# Patient Record
Sex: FEMALE | Race: WHITE | Employment: OTHER | URBAN - METROPOLITAN AREA
[De-identification: names, ages, dates, MRNs, and addresses within clinical notes are randomized per-mention and may not be internally consistent; named-entity substitution may affect disease eponyms.]

---

## 2017-04-28 ENCOUNTER — OFFICE VISIT (OUTPATIENT)
Dept: INTERNAL MEDICINE CLINIC | Age: 67
End: 2017-04-28

## 2017-04-28 VITALS
HEART RATE: 75 BPM | OXYGEN SATURATION: 95 % | DIASTOLIC BLOOD PRESSURE: 70 MMHG | RESPIRATION RATE: 16 BRPM | BODY MASS INDEX: 24.35 KG/M2 | SYSTOLIC BLOOD PRESSURE: 124 MMHG | WEIGHT: 124 LBS | HEIGHT: 60 IN | TEMPERATURE: 98.2 F

## 2017-04-28 DIAGNOSIS — F33.9 RECURRENT MAJOR DEPRESSIVE DISORDER, REMISSION STATUS UNSPECIFIED (HCC): ICD-10-CM

## 2017-04-28 DIAGNOSIS — E78.2 MIXED HYPERLIPIDEMIA: ICD-10-CM

## 2017-04-28 DIAGNOSIS — Z00.00 ROUTINE GENERAL MEDICAL EXAMINATION AT A HEALTH CARE FACILITY: Primary | ICD-10-CM

## 2017-04-28 DIAGNOSIS — Z12.31 VISIT FOR SCREENING MAMMOGRAM: ICD-10-CM

## 2017-04-28 DIAGNOSIS — F32.A DEPRESSION, UNSPECIFIED DEPRESSION TYPE: Chronic | ICD-10-CM

## 2017-04-28 RX ORDER — VENLAFAXINE 25 MG/1
12.5 TABLET ORAL 2 TIMES DAILY
Qty: 30 TAB | Refills: 12 | Status: SHIPPED | OUTPATIENT
Start: 2017-04-28 | End: 2018-04-03 | Stop reason: SDUPTHER

## 2017-04-28 NOTE — MR AVS SNAPSHOT
Visit Information Date & Time Provider Department Dept. Phone Encounter #  
 4/28/2017  8:15 AM Renate Bocanegra MD [facility] [place] [phone] [id number] Follow-up Instructions Return if symptoms worsen or fail to improve. Follow-up and Disposition History Upcoming Health Maintenance Date Due Hepatitis C Screening 1950 DTaP/Tdap/Td series (1 - Tdap) 12/4/1973 FOBT Q 1 YEAR AGE 50-75 12/4/2002 ZOSTER VACCINE AGE 60> 12/4/2012 PAP AKA CERVICAL CYTOLOGY 6/21/2014 BREAST CANCER SCRN MAMMOGRAM 8/12/2015 INFLUENZA AGE 9 TO ADULT 8/1/2016 Allergies as of 4/28/2017  Review Complete On: 4/28/2017 By: Renate Bocanegra MD  
  
 Severity Noted Reaction Type Reactions Septra [Sulfamethoprim Ds]  07/01/2014    Rash Current Immunizations  Reviewed on 2/3/2016 Name Date Influenza Vaccine (Quad) PF 2/3/2016 Not reviewed this visit You Were Diagnosed With   
  
 Codes Comments Routine general medical examination at a health care facility    -  Primary ICD-10-CM: Z00.00 ICD-9-CM: V70.0 Depression, unspecified depression type     ICD-10-CM: F32.9 ICD-9-CM: 139 Mixed hyperlipidemia     ICD-10-CM: E78.2 ICD-9-CM: 272.2 Visit for screening mammogram     ICD-10-CM: Z12.31 
ICD-9-CM: V76.12 Recurrent major depressive disorder, remission status unspecified ([facility] 75.)     ICD-10-CM: F33.9 ICD-9-CM: 296.30 Vitals BP Pulse Temp Resp Height(growth percentile) 124/70 (BP 1 Location: Left arm, BP Patient Position: Sitting) 75 98.2 °F (36.8 °C) (Tympanic) 16 5' (1.524 m) Weight(growth percentile) SpO2 BMI OB Status Smoking Status 124 lb (56.2 kg) 95% 24.22 kg/m2 Menopause Never Smoker BMI and BSA Data Body Mass Index Body Surface Area  
 24.22 kg/m 2 1.54 m 2 Preferred Pharmacy Pharmacy Name Phone [id number]  [phone] Your Updated Medication List  
  
   
This list is accurate as of: 4/28/17  8:40 AM.  Always use your most recent med list.  
  
  
  
  
 cetirizine 10 mg tablet Commonly known as:  ZyrTEC Take 1 Tab by mouth daily. triamcinolone acetonide 0.1 % topical cream  
Commonly known as:  KENALOG Apply  to affected area two (2) times a day. use thin layer for up to 2 weeks. venlafaxine 25 mg tablet Commonly known as:  [facility] Take 0.5 Tabs by mouth two (2) times a day. Prescriptions Sent to Pharmacy Refills  
 venlafaxine (EFFEXOR) 25 mg tablet 12 Sig: Take 0.5 Tabs by mouth two (2) times a day. Class: Normal  
 Pharmacy: [address] [place] [address] #: [phone] Route: Oral  
  
Follow-up Instructions Return if symptoms worsen or fail to improve. To-Do List   
 04/28/2017 Lab:  CBC WITH AUTOMATED DIFF   
  
 04/28/2017 Lab:  LIPID PANEL   
  
 04/28/2017 Imaging:  IL[facility]NA MAMMO BI SCREENING INCL CAD   
  
 04/28/2017 Lab:  METABOLIC PANEL, COMPREHENSIVE   
  
 05/12/2017 7:00 AM  
  Appointment with [facility] RM 2 at [address] [place] [address] ([phone]) OUTSIDE FILMS  - Any outside films related to the study being scheduled should be brought with you on the day of the exam.  If this cannot be done there may be a delay in the reading of the study. MEDICATIONS  - Patient must bring a complete list of all medications currently taking to include prescriptions, over-the-counter meds, herbals, vitamins & any dietary supplements  GENERAL INSTRUCTIONS  - On the day of your exam do not use any bath powder, deodorant or lotions on the armpit area. -Tenderness of breasts may cause an increase of discomfort during procedure. If you are experiencing breast tenderness on the day of your appointment and would like to reschedule, please call [phone]. Patient Instructions High Cholesterol: Care Instructions Your Care Instructions Cholesterol is a type of fat in your blood. It is needed for many body functions, such as making new cells. Cholesterol is made by your body. It also comes from food you eat. High cholesterol means that you have too much of the fat in your blood. This raises your risk of a heart attack and stroke. LDL and HDL are part of your total cholesterol. LDL is the \"bad\" cholesterol. High LDL can raise your risk for heart disease, heart attack, and stroke. HDL is the \"good\" cholesterol. It helps clear bad cholesterol from the body. High HDL is linked with a lower risk of heart disease, heart attack, and stroke. Your cholesterol levels help your doctor find out your risk for having a heart attack or stroke. You and your doctor can talk about whether you need to lower your risk and what treatment is best for you. A heart-healthy lifestyle along with medicines can help lower your cholesterol and your risk. The way you choose to lower your risk will depend on how high your risk is for heart attack and stroke. It will also depend on how you feel about taking medicines. Follow-up care is a key part of your treatment and safety. Be sure to make and go to all appointments, and call your doctor if you are having problems. It's also a good idea to know your test results and keep a list of the medicines you take. How can you care for yourself at home? · Eat a variety of foods every day. Good choices include fruits, vegetables, whole grains (like oatmeal), dried beans and peas, nuts and seeds, soy products (like tofu), and fat-free or low-fat dairy products. · Replace butter, margarine, and hydrogenated or partially hydrogenated oils with olive and canola oils. (Canola oil margarine without trans fat is fine.) · Replace red meat with fish, poultry, and soy protein (like tofu). · Limit processed and packaged foods like chips, crackers, and cookies. · Bake, broil, or steam foods. Don't stearns them. · Be physically active. Get at least 30 minutes of exercise on most days of the week. Walking is a good choice. You also may want to do other activities, such as running, swimming, cycling, or playing tennis or team sports. · Stay at a healthy weight or lose weight by making the changes in eating and physical activity listed above. Losing just a small amount of weight, even 5 to 10 pounds, can reduce your risk for having a heart attack or stroke. · Do not smoke. When should you call for help? Watch closely for changes in your health, and be sure to contact your doctor if: 
· You need help making lifestyle changes. · You have questions about your medicine. Where can you learn more? Go to http://ronald-hung.info/. Enter R782 in the search box to learn more about \"High Cholesterol: Care Instructions. \" Current as of: January 27, 2016 Content Version: 11.2 © 7063-2038 [employer]lex[employer]. Care instructions adapted under license by [employer] (which disclaims liability or warranty for this information). If you have questions about a medical condition or this instruction, always ask your healthcare professional. [address] any warranty or liability for your use of this information. Introducing [facility]! Andrea Hill introduces Active DSP patient portal. Now you can access parts of your medical record, email your doctor's office, and request medication refills online. 1. In your internet browser, go to [email or web address] 2. Click on the First Time User? Click Here link in the Sign In box. You will see the New Member Sign Up page. 3. Enter your Active DSP Access Code exactly as it appears below. You will not need to use this code after youve completed the sign-up process. If you do not sign up before the expiration date, you must request a new code. · Ipracom Access Code: [email or web address] Expires: 7/27/2017  8:27 AM 
 
4. Enter the last four digits of your Social Security Number (xxxx) and Date of Birth (mm/dd/yyyy) as indicated and click Submit. You will be taken to the next sign-up page. 5. Create a Ipracom ID. This will be your Ipracom login ID and cannot be changed, so think of one that is secure and easy to remember. 6. Create a Ipracom password. You can change your password at any time. 7. Enter your Password Reset Question and Answer. This can be used at a later time if you forget your password. 8. Enter your e-mail address. You will receive e-mail notification when new information is available in [address] 9. Click Sign Up. You can now view and download portions of your medical record. 10. Click the Download Summary menu link to download a portable copy of your medical information. If you have questions, please visit the Frequently Asked Questions section of the Ipracom website. Remember, Ipracom is NOT to be used for urgent needs. For medical emergencies, dial 911. Now available from your iPhone and Android! Please provide this summary of care documentation to your next provider. Your primary care clinician is listed as Steph Hua. If you have any questions after today's visit, please call [phone].

## 2017-04-28 NOTE — PATIENT INSTRUCTIONS

## 2017-04-28 NOTE — PROGRESS NOTES
HPI:   Routine check up  Hx notable for hyperlipidemia (chol 249 Feb 2016) and mild depression (mild on rx)  w/o chest pain/abd. discomfort; no dyspnea, cough or pedal edema; denies constitutional complaints of fever, night sweats or wt loss; no evidence of GI/ hemorrhage; no polyuria/polydipsia. Activity is age appropriate. ROS is otherwise negative. Past Medical History:   Diagnosis Date    Depression        Past Surgical History:   Procedure Laterality Date    HX COLONOSCOPY  2007    neg       Social History     Social History    Marital status:      Spouse name: N/A    Number of children: N/A    Years of education: N/A     Occupational History    Not on file. Social History Main Topics    Smoking status: Never Smoker    Smokeless tobacco: Never Used    Alcohol use Yes    Drug use: No    Sexual activity: Not on file     Other Topics Concern    Not on file     Social History Narrative       Allergies   Allergen Reactions    Septra [Sulfamethoprim Ds] Rash       Family History   Problem Relation Age of Onset    Hypertension Father     Heart Disease Father        Current Outpatient Prescriptions   Medication Sig Dispense Refill    venlafaxine (EFFEXOR) 25 mg tablet Take 0.5 Tabs by mouth two (2) times a day. 30 Tab 10    triamcinolone acetonide (KENALOG) 0.1 % topical cream Apply  to affected area two (2) times a day. use thin layer for up to 2 weeks. 15 g 1    cetirizine (ZYRTEC) 10 mg tablet Take 1 Tab by mouth daily. (Patient taking differently: Take 10 mg by mouth daily as needed.) 30 Tab 3           Visit Vitals    /70 (BP 1 Location: Left arm, BP Patient Position: Sitting)    Pulse 75    Temp 98.2 °F (36.8 °C) (Tympanic)    Resp 16    Ht 5' (1.524 m)    Wt 124 lb (56.2 kg)    SpO2 95%    BMI 24.22 kg/m2       PE  Well nourished in NAD  HEENT:   OP: clear. Neck: supple w/o mass or bruits. Chest: clear. CV: RRR w/o m,r,g; pulses intact.   Abd: soft, NT, w/o HSM or mass. Ext: w/o edema. Neuro: NF. Assessment and Plan    Encounter Diagnoses   Name Primary?     Routine general medical examination at a health care facility Yes    Depression, unspecified depression type     Mixed hyperlipidemia    depression - stable on rx  Hyperlipidemia - continue dietary/exercise efforts  Labs soon to assess metabolic parameters  Mammo/colo advised  OV 12 mos or prn  I have explained plan to patient and the patient verbalizes understanding

## 2017-04-29 LAB
ALBUMIN SERPL-MCNC: 4 G/DL (ref 3.6–4.8)
ALBUMIN/GLOB SERPL: 1.3 {RATIO} (ref 1.2–2.2)
ALP SERPL-CCNC: 73 IU/L (ref 39–117)
ALT SERPL-CCNC: 12 IU/L (ref 0–32)
AST SERPL-CCNC: 24 IU/L (ref 0–40)
BASOPHILS # BLD AUTO: 0 X10E3/UL (ref 0–0.2)
BASOPHILS NFR BLD AUTO: 1 %
BILIRUB SERPL-MCNC: 0.6 MG/DL (ref 0–1.2)
BUN SERPL-MCNC: 16 MG/DL (ref 8–27)
BUN/CREAT SERPL: 25 (ref 12–28)
CALCIUM SERPL-MCNC: 9.2 MG/DL (ref 8.7–10.3)
CHLORIDE SERPL-SCNC: 101 MMOL/L (ref 96–106)
CHOLEST SERPL-MCNC: 255 MG/DL (ref 100–199)
CO2 SERPL-SCNC: 24 MMOL/L (ref 18–29)
CREAT SERPL-MCNC: 0.63 MG/DL (ref 0.57–1)
EOSINOPHIL # BLD AUTO: 0.2 X10E3/UL (ref 0–0.4)
EOSINOPHIL NFR BLD AUTO: 4 %
ERYTHROCYTE [DISTWIDTH] IN BLOOD BY AUTOMATED COUNT: 13.2 % (ref 12.3–15.4)
GLOBULIN SER CALC-MCNC: 3.1 G/DL (ref 1.5–4.5)
GLUCOSE SERPL-MCNC: 84 MG/DL (ref 65–99)
HCT VFR BLD AUTO: 37.2 % (ref 34–46.6)
HDLC SERPL-MCNC: 78 MG/DL
HGB BLD-MCNC: 12.4 G/DL (ref 11.1–15.9)
IMM GRANULOCYTES # BLD: 0 X10E3/UL (ref 0–0.1)
IMM GRANULOCYTES NFR BLD: 0 %
LDLC SERPL CALC-MCNC: 161 MG/DL (ref 0–99)
LYMPHOCYTES # BLD AUTO: 1.2 X10E3/UL (ref 0.7–3.1)
LYMPHOCYTES NFR BLD AUTO: 31 %
MCH RBC QN AUTO: 30.3 PG (ref 26.6–33)
MCHC RBC AUTO-ENTMCNC: 33.3 G/DL (ref 31.5–35.7)
MCV RBC AUTO: 91 FL (ref 79–97)
MONOCYTES # BLD AUTO: 0.5 X10E3/UL (ref 0.1–0.9)
MONOCYTES NFR BLD AUTO: 14 %
NEUTROPHILS # BLD AUTO: 1.9 X10E3/UL (ref 1.4–7)
NEUTROPHILS NFR BLD AUTO: 50 %
PLATELET # BLD AUTO: 277 X10E3/UL (ref 150–379)
POTASSIUM SERPL-SCNC: 4.7 MMOL/L (ref 3.5–5.2)
PROT SERPL-MCNC: 7.1 G/DL (ref 6–8.5)
RBC # BLD AUTO: 4.09 X10E6/UL (ref 3.77–5.28)
SODIUM SERPL-SCNC: 140 MMOL/L (ref 134–144)
TRIGL SERPL-MCNC: 78 MG/DL (ref 0–149)
VLDLC SERPL CALC-MCNC: 16 MG/DL (ref 5–40)
WBC # BLD AUTO: 3.9 X10E3/UL (ref 3.4–10.8)

## 2017-05-12 ENCOUNTER — HOSPITAL ENCOUNTER (OUTPATIENT)
Dept: MAMMOGRAPHY | Age: 67
Discharge: HOME | End: 2017-05-12
Attending: INTERNAL MEDICINE
Payer: COMMERCIAL

## 2017-05-12 DIAGNOSIS — Z12.31 VISIT FOR SCREENING MAMMOGRAM: ICD-10-CM

## 2017-05-12 PROCEDURE — 77067 SCR MAMMO BI INCL CAD: CPT

## 2018-01-08 ENCOUNTER — OFFICE VISIT (OUTPATIENT)
Dept: INTERNAL MEDICINE CLINIC | Age: 68
End: 2018-01-08

## 2018-01-08 VITALS
RESPIRATION RATE: 16 BRPM | HEART RATE: 72 BPM | HEIGHT: 60 IN | WEIGHT: 125 LBS | SYSTOLIC BLOOD PRESSURE: 110 MMHG | OXYGEN SATURATION: 98 % | BODY MASS INDEX: 24.54 KG/M2 | DIASTOLIC BLOOD PRESSURE: 68 MMHG | TEMPERATURE: 99.4 F

## 2018-01-08 DIAGNOSIS — Z23 ENCOUNTER FOR IMMUNIZATION: ICD-10-CM

## 2018-01-08 DIAGNOSIS — R21 RASH: Primary | ICD-10-CM

## 2018-01-08 DIAGNOSIS — F32.A DEPRESSION, UNSPECIFIED DEPRESSION TYPE: Chronic | ICD-10-CM

## 2018-01-08 RX ORDER — PREDNISONE 10 MG/1
TABLET ORAL
Qty: 39 TAB | Refills: 0 | Status: SHIPPED | OUTPATIENT
Start: 2018-01-08 | End: 2018-04-03

## 2018-01-08 NOTE — PROGRESS NOTES
HPI:   Pruritic rash arms/legs/thorax x 10 days w/o new meds; no new cosmetics, diet etc; no fever  Hx notable for depression    ROS is otherwise negative. Past Medical History:   Diagnosis Date    Depression        Past Surgical History:   Procedure Laterality Date    HX COLONOSCOPY  2007    neg       Social History     Social History    Marital status:      Spouse name: N/A    Number of children: N/A    Years of education: N/A     Occupational History    Not on file. Social History Main Topics    Smoking status: Never Smoker    Smokeless tobacco: Never Used    Alcohol use Yes    Drug use: No    Sexual activity: Not on file     Other Topics Concern    Not on file     Social History Narrative       Allergies   Allergen Reactions    Septra [Sulfamethoprim Ds] Rash       Family History   Problem Relation Age of Onset    Hypertension Father     Heart Disease Father        Current Outpatient Prescriptions   Medication Sig Dispense Refill    venlafaxine (EFFEXOR) 25 mg tablet Take 0.5 Tabs by mouth two (2) times a day. 30 Tab 12    triamcinolone acetonide (KENALOG) 0.1 % topical cream Apply  to affected area two (2) times a day. use thin layer for up to 2 weeks. 15 g 1    cetirizine (ZYRTEC) 10 mg tablet Take 1 Tab by mouth daily. (Patient taking differently: Take 10 mg by mouth daily as needed.) 30 Tab 3           Visit Vitals    /68 (BP 1 Location: Left arm, BP Patient Position: Sitting)    Pulse 72    Temp 99.4 °F (37.4 °C) (Tympanic)    Resp 16    Ht 5' (1.524 m)    Wt 125 lb (56.7 kg)    SpO2 98%    BMI 24.41 kg/m2       PE  Well nourished in NAD  HEENT:   OP: clear. Neck: supple w/o mass or bruits. Chest: clear. CV: RRR w/o m,r,g; pulses intact. Abd: soft, NT, w/o HSM or mass. Ext: w/o edema. Skin: mac-pap rash arms/legs and thorax  Neuro: NF. Assessment and Plan    Encounter Diagnoses   Name Primary?     Rash Yes    Depression, unspecified depression type  Encounter for immunization    Rash: ?allergy vs xerosis  Moisturize; benadryl prn; HC 1 % cream tid prn  Prednisone taper  Depression - stable  Flu vaccine given  OV April 2018 or prn  I have explained plan to patient and the patient verbalizes understanding

## 2018-01-08 NOTE — PATIENT INSTRUCTIONS
Rash: Care Instructions  Your Care Instructions  A rash is any irritation or inflammation of the skin. Rashes have many possible causes, including allergy, infection, illness, heat, and emotional stress. Follow-up care is a key part of your treatment and safety. Be sure to make and go to all appointments, and call your doctor if you are having problems. It's also a good idea to know your test results and keep a list of the medicines you take. How can you care for yourself at home? · Wash the area with water only. Soap can make dryness and itching worse. Pat dry. · Put cold, wet cloths on the rash to reduce itching. · Keep cool, and stay out of the sun. · Leave the rash open to the air as much of the time as possible. · Sometimes petroleum jelly (Vaseline) can help relieve the discomfort caused by a rash. A moisturizing lotion, such as Cetaphil, also may help. Calamine lotion may help for rashes caused by contact with something (such as a plant or soap) that irritated the skin. Use it 3 or 4 times a day. · If your doctor prescribed a cream, use it as directed. If your doctor prescribed medicine, take it exactly as directed. · If your rash itches so badly that it interferes with your normal activities, take an over-the-counter antihistamine, such as diphenhydramine (Benadryl) or loratadine (Claritin). Read and follow all instructions on the label. When should you call for help? Call your doctor now or seek immediate medical care if:  ? · You have signs of infection, such as:  ¨ Increased pain, swelling, warmth, or redness. ¨ Red streaks leading from the area. ¨ Pus draining from the area. ¨ A fever. ? · You have joint pain along with the rash. ? Watch closely for changes in your health, and be sure to contact your doctor if:  ? · Your rash is changing or getting worse. For example, call if you have pain along with the rash, the rash is spreading, or you have new blisters.    ? · You do not get better after 1 week. Where can you learn more? Go to http://ronald-hung.info/. Enter R980 in the search box to learn more about \"Rash: Care Instructions. \"  Current as of: October 13, 2016  Content Version: 11.4  © 1188-2069 [employer]wise, [employer]. Care instructions adapted under license by [employer] (which disclaims liability or warranty for this information). If you have questions about a medical condition or this instruction, always ask your healthcare professional. [address] any warranty or liability for your use of this information.

## 2018-04-03 ENCOUNTER — OFFICE VISIT (OUTPATIENT)
Dept: INTERNAL MEDICINE CLINIC | Age: 68
End: 2018-04-03

## 2018-04-03 VITALS
TEMPERATURE: 99.5 F | RESPIRATION RATE: 16 BRPM | HEIGHT: 60 IN | SYSTOLIC BLOOD PRESSURE: 124 MMHG | DIASTOLIC BLOOD PRESSURE: 79 MMHG | HEART RATE: 79 BPM | WEIGHT: 129 LBS | BODY MASS INDEX: 25.32 KG/M2 | OXYGEN SATURATION: 95 %

## 2018-04-03 DIAGNOSIS — Z00.00 ROUTINE GENERAL MEDICAL EXAMINATION AT A HEALTH CARE FACILITY: Primary | ICD-10-CM

## 2018-04-03 DIAGNOSIS — F32.A DEPRESSION, UNSPECIFIED DEPRESSION TYPE: Chronic | ICD-10-CM

## 2018-04-03 DIAGNOSIS — E78.2 MIXED HYPERLIPIDEMIA: ICD-10-CM

## 2018-04-03 DIAGNOSIS — F33.9 RECURRENT MAJOR DEPRESSIVE DISORDER, REMISSION STATUS UNSPECIFIED (HCC): ICD-10-CM

## 2018-04-03 DIAGNOSIS — Z23 ENCOUNTER FOR IMMUNIZATION: ICD-10-CM

## 2018-04-03 RX ORDER — VENLAFAXINE 25 MG/1
12.5 TABLET ORAL 2 TIMES DAILY
Qty: 30 TAB | Refills: 12 | Status: SHIPPED | OUTPATIENT
Start: 2018-04-03 | End: 2019-06-09

## 2018-04-03 NOTE — PROGRESS NOTES
1. Have you been to the ER, urgent care clinic since your last visit? Hospitalized since your last visit? No    2. Have you seen or consulted any other health care providers outside of the [address] [place] [address] since your last visit? Include any pap smears or colon screening.  No

## 2018-04-03 NOTE — PATIENT INSTRUCTIONS
High Cholesterol: Care Instructions  Your Care Instructions    Cholesterol is a type of fat in your blood. It is needed for many body functions, such as making new cells. Cholesterol is made by your body. It also comes from food you eat. High cholesterol means that you have too much of the fat in your blood. This raises your risk of a heart attack and stroke. LDL and HDL are part of your total cholesterol. LDL is the \"bad\" cholesterol. High LDL can raise your risk for heart disease, heart attack, and stroke. HDL is the \"good\" cholesterol. It helps clear bad cholesterol from the body. High HDL is linked with a lower risk of heart disease, heart attack, and stroke. Your cholesterol levels help your doctor find out your risk for having a heart attack or stroke. You and your doctor can talk about whether you need to lower your risk and what treatment is best for you. A heart-healthy lifestyle along with medicines can help lower your cholesterol and your risk. The way you choose to lower your risk will depend on how high your risk is for heart attack and stroke. It will also depend on how you feel about taking medicines. Follow-up care is a key part of your treatment and safety. Be sure to make and go to all appointments, and call your doctor if you are having problems. It's also a good idea to know your test results and keep a list of the medicines you take. How can you care for yourself at home? · Eat a variety of foods every day. Good choices include fruits, vegetables, whole grains (like oatmeal), dried beans and peas, nuts and seeds, soy products (like tofu), and fat-free or low-fat dairy products. · Replace butter, margarine, and hydrogenated or partially hydrogenated oils with olive and canola oils. (Canola oil margarine without trans fat is fine.)  · Replace red meat with fish, poultry, and soy protein (like tofu). · Limit processed and packaged foods like chips, crackers, and cookies.   · Bake, broil, or steam foods. Don't stearns them. · Be physically active. Get at least 30 minutes of exercise on most days of the week. Walking is a good choice. You also may want to do other activities, such as running, swimming, cycling, or playing tennis or team sports. · Stay at a healthy weight or lose weight by making the changes in eating and physical activity listed above. Losing just a small amount of weight, even 5 to 10 pounds, can reduce your risk for having a heart attack or stroke. · Do not smoke. When should you call for help? Watch closely for changes in your health, and be sure to contact your doctor if:  ? · You need help making lifestyle changes. ? · You have questions about your medicine. Where can you learn more? Go to http://ronald-hung.info/. Enter B329 in the search box to learn more about \"High Cholesterol: Care Instructions. \"  Current as of: September 21, 2016  Content Version: 11.4  © 4413-8034 [employer]Barr[employer]. Care instructions adapted under license by [employer] (which disclaims liability or warranty for this information). If you have questions about a medical condition or this instruction, always ask your healthcare professional. [address] any warranty or liability for your use of this information. Body Mass Index: Care Instructions  Your Care Instructions    Body mass index (BMI) can help you see if your weight is raising your risk for health problems. It uses a formula to compare how much you weigh with how tall you are. · A BMI lower than 18.5 is considered underweight. · A BMI between 18.5 and 24.9 is considered healthy. · A BMI between 25 and 29.9 is considered overweight. A BMI of 30 or higher is considered obese. If your BMI is in the normal range, it means that you have a lower risk for weight-related health problems.  If your BMI is in the overweight or obese range, you may be at increased risk for weight-related health problems, such as high blood pressure, heart disease, stroke, arthritis or joint pain, and diabetes. If your BMI is in the underweight range, you may be at increased risk for health problems such as fatigue, lower protection (immunity) against illness, muscle loss, bone loss, hair loss, and hormone problems. BMI is just one measure of your risk for weight-related health problems. You may be at higher risk for health problems if you are not active, you eat an unhealthy diet, or you drink too much alcohol or use tobacco products. Follow-up care is a key part of your treatment and safety. Be sure to make and go to all appointments, and call your doctor if you are having problems. It's also a good idea to know your test results and keep a list of the medicines you take. How can you care for yourself at home? · Practice healthy eating habits. This includes eating plenty of fruits, vegetables, whole grains, lean protein, and low-fat dairy. · If your doctor recommends it, get more exercise. Walking is a good choice. Bit by bit, increase the amount you walk every day. Try for at least 30 minutes on most days of the week. · Do not smoke. Smoking can increase your risk for health problems. If you need help quitting, talk to your doctor about stop-smoking programs and medicines. These can increase your chances of quitting for good. · Limit alcohol to 2 drinks a day for men and 1 drink a day for women. Too much alcohol can cause health problems. If you have a BMI higher than 25  · Your doctor may do other tests to check your risk for weight-related health problems. This may include measuring the distance around your waist. A waist measurement of more than 40 inches in men or 35 inches in women can increase the risk of weight-related health problems. · Talk with your doctor about steps you can take to stay healthy or improve your health.  You may need to make lifestyle changes to lose weight and stay healthy, such as changing your diet and getting regular exercise. If you have a BMI lower than 18.5  · Your doctor may do other tests to check your risk for health problems. · Talk with your doctor about steps you can take to stay healthy or improve your health. You may need to make lifestyle changes to gain or maintain weight and stay healthy, such as getting more healthy foods in your diet and doing exercises to build muscle. Where can you learn more? Go to http://ronald-hung.info/. Enter S176 in the search box to learn more about \"Body Mass Index: Care Instructions. \"  Current as of: October 13, 2016  Content Version: 11.4  © 6191-8827 [email or web address]. Care instructions adapted under license by [employer] (which disclaims liability or warranty for this information). If you have questions about a medical condition or this instruction, always ask your healthcare professional. [address] any warranty or liability for your use of this information.

## 2018-04-03 NOTE — PROGRESS NOTES
HPI:   Check up  Hx notable for hyperlipidemia (chol 255 last year) and depression (mild sxs)  w/o chest pain/abd. discomfort; no dyspnea, cough or pedal edema; denies constitutional complaints of fever, night sweats or wt loss; no evidence of GI/ hemorrhage; no polyuria/polydipsia. Activity is age appropriate. ROS is otherwise negative. Past Medical History:   Diagnosis Date    Depression        Past Surgical History:   Procedure Laterality Date    HX COLONOSCOPY  2007    neg       Social History     Social History    Marital status:      Spouse name: N/A    Number of children: N/A    Years of education: N/A     Occupational History    Not on file. Social History Main Topics    Smoking status: Never Smoker    Smokeless tobacco: Never Used    Alcohol use Yes    Drug use: No    Sexual activity: Not on file     Other Topics Concern    Not on file     Social History Narrative       Allergies   Allergen Reactions    Septra [Sulfamethoprim Ds] Rash       Family History   Problem Relation Age of Onset    Hypertension Father     Heart Disease Father        Current Outpatient Prescriptions   Medication Sig Dispense Refill    venlafaxine (EFFEXOR) 25 mg tablet Take 0.5 Tabs by mouth two (2) times a day. 30 Tab 12    triamcinolone acetonide (KENALOG) 0.1 % topical cream Apply  to affected area two (2) times a day. use thin layer for up to 2 weeks. 15 g 1    cetirizine (ZYRTEC) 10 mg tablet Take 1 Tab by mouth daily. (Patient taking differently: Take 10 mg by mouth daily as needed.) 30 Tab 3           Visit Vitals    /79 (BP 1 Location: Left arm, BP Patient Position: Sitting)    Pulse 79    Temp 99.5 °F (37.5 °C) (Tympanic)    Resp 16    Ht 5' (1.524 m)    Wt 129 lb (58.5 kg)    SpO2 95%    BMI 25.19 kg/m2       PE  Well nourished in NAD  HEENT:  OP: clear. Neck: supple w/o mass or bruits. Chest: clear. CV: RRR w/o m,r,g; pulses intact.   Abd: soft, NT, w/o HSM or mass.  Ext: w/o edema. Neuro: NF. Assessment and Plan    Encounter Diagnoses   Name Primary?  Routine general medical examination at a health care facility Yes    Mixed hyperlipidemia     Depression, unspecified depression type      Hyperlipidemia - continue dietary/exercise efforts; labs soon to assess  Depression - mild sxs; stable on rx  Mammogram neg 5/2017  [place]ledge advised  prevnar given  OV 12 mos or prn  I have explained plan to patient and the patient verbalizes understanding      Discussed the patient's BMI with her. The BMI follow up plan is as follows:     dietary management education, guidance, and counseling  encourage exercise  monitor weight  prescribed dietary intake    An After Visit Summary was printed and given to the patient.

## 2018-11-08 ENCOUNTER — TELEPHONE (OUTPATIENT)
Dept: INTERNAL MEDICINE CLINIC | Age: 68
End: 2018-11-08

## 2018-11-08 NOTE — TELEPHONE ENCOUNTER
Patient called and advised per Dr Theresa Purvis to drink plenty of fluids and do not take any more milk of mag   Patient verbalized understanding

## 2018-11-08 NOTE — TELEPHONE ENCOUNTER
Patient called and stated that she was constipated so she took milk of magnesia yesterday. She had an explosive diarrhea yesterday and has diarrhea since yesterday. It is white with spots of brown. She is having abdominal cramping. Please advise at [phone].

## 2018-11-12 ENCOUNTER — HOSPITAL ENCOUNTER (EMERGENCY)
Age: 68
Discharge: HOME | End: 2018-11-12
Attending: EMERGENCY MEDICINE
Payer: COMMERCIAL

## 2018-11-12 ENCOUNTER — APPOINTMENT (OUTPATIENT)
Dept: CT IMAGING | Age: 68
End: 2018-11-12
Attending: EMERGENCY MEDICINE
Payer: COMMERCIAL

## 2018-11-12 VITALS
HEART RATE: 97 BPM | BODY MASS INDEX: 24.15 KG/M2 | TEMPERATURE: 99.2 F | SYSTOLIC BLOOD PRESSURE: 131 MMHG | DIASTOLIC BLOOD PRESSURE: 69 MMHG | RESPIRATION RATE: 18 BRPM | HEIGHT: 60 IN | WEIGHT: 123 LBS | OXYGEN SATURATION: 94 %

## 2018-11-12 DIAGNOSIS — K57.92 DIVERTICULITIS: Primary | ICD-10-CM

## 2018-11-12 LAB
ALBUMIN SERPL-MCNC: 2.9 G/DL (ref 3.4–5)
ALBUMIN/GLOB SERPL: 0.6 {RATIO} (ref 0.8–1.7)
ALP SERPL-CCNC: 89 U/L (ref 45–117)
ALT SERPL-CCNC: 19 U/L (ref 13–56)
ANION GAP SERPL CALC-SCNC: 12 MMOL/L (ref 3–18)
APPEARANCE UR: ABNORMAL
AST SERPL-CCNC: 25 U/L (ref 15–37)
BACTERIA URNS QL MICRO: ABNORMAL /HPF
BASOPHILS # BLD: 0.1 K/UL (ref 0–0.1)
BASOPHILS NFR BLD: 0 % (ref 0–2)
BILIRUB DIRECT SERPL-MCNC: 0.1 MG/DL (ref 0–0.2)
BILIRUB SERPL-MCNC: 0.4 MG/DL (ref 0.2–1)
BILIRUB UR QL: NEGATIVE
BUN SERPL-MCNC: 14 MG/DL (ref 7–18)
BUN/CREAT SERPL: 15 (ref 12–20)
CALCIUM SERPL-MCNC: 8.7 MG/DL (ref 8.5–10.1)
CHLORIDE SERPL-SCNC: 98 MMOL/L (ref 100–108)
CK MB CFR SERPL CALC: NORMAL % (ref 0–4)
CK MB SERPL-MCNC: <1 NG/ML (ref 5–25)
CK SERPL-CCNC: 30 U/L (ref 26–192)
CO2 SERPL-SCNC: 24 MMOL/L (ref 21–32)
COLOR UR: ABNORMAL
CREAT SERPL-MCNC: 0.96 MG/DL (ref 0.6–1.3)
DIFFERENTIAL METHOD BLD: ABNORMAL
EOSINOPHIL # BLD: 0.1 K/UL (ref 0–0.4)
EOSINOPHIL NFR BLD: 0 % (ref 0–5)
EPITH CASTS URNS QL MICRO: ABNORMAL /LPF (ref 0–5)
ERYTHROCYTE [DISTWIDTH] IN BLOOD BY AUTOMATED COUNT: 11.5 % (ref 11.6–14.5)
GLOBULIN SER CALC-MCNC: 5 G/DL (ref 2–4)
GLUCOSE SERPL-MCNC: 109 MG/DL (ref 74–99)
GLUCOSE UR STRIP.AUTO-MCNC: NEGATIVE MG/DL
HCT VFR BLD AUTO: 36.3 % (ref 35–45)
HGB BLD-MCNC: 12.5 G/DL (ref 12–16)
HGB UR QL STRIP: ABNORMAL
KETONES UR QL STRIP.AUTO: 15 MG/DL
LACTATE BLD-SCNC: 1.25 MMOL/L (ref 0.4–2)
LEUKOCYTE ESTERASE UR QL STRIP.AUTO: ABNORMAL
LIPASE SERPL-CCNC: 161 U/L (ref 73–393)
LYMPHOCYTES # BLD: 1.4 K/UL (ref 0.9–3.6)
LYMPHOCYTES NFR BLD: 11 % (ref 21–52)
MCH RBC QN AUTO: 31 PG (ref 24–34)
MCHC RBC AUTO-ENTMCNC: 34.4 G/DL (ref 31–37)
MCV RBC AUTO: 90.1 FL (ref 74–97)
MONOCYTES # BLD: 1.4 K/UL (ref 0.05–1.2)
MONOCYTES NFR BLD: 11 % (ref 3–10)
MUCOUS THREADS URNS QL MICRO: ABNORMAL /LPF
NEUTS SEG # BLD: 9.8 K/UL (ref 1.8–8)
NEUTS SEG NFR BLD: 78 % (ref 40–73)
NITRITE UR QL STRIP.AUTO: NEGATIVE
PH UR STRIP: 5.5 [PH] (ref 5–8)
PLATELET # BLD AUTO: 418 K/UL (ref 135–420)
PMV BLD AUTO: 8.9 FL (ref 9.2–11.8)
POTASSIUM SERPL-SCNC: 3.7 MMOL/L (ref 3.5–5.5)
PROT SERPL-MCNC: 7.9 G/DL (ref 6.4–8.2)
PROT UR STRIP-MCNC: 100 MG/DL
RBC # BLD AUTO: 4.03 M/UL (ref 4.2–5.3)
RBC #/AREA URNS HPF: ABNORMAL /HPF (ref 0–5)
SODIUM SERPL-SCNC: 134 MMOL/L (ref 136–145)
SP GR UR REFRACTOMETRY: 1.01 (ref 1–1.03)
TROPONIN I SERPL-MCNC: <0.02 NG/ML (ref 0–0.06)
UROBILINOGEN UR QL STRIP.AUTO: 1 EU/DL (ref 0.2–1)
WBC # BLD AUTO: 12.7 K/UL (ref 4.6–13.2)
WBC URNS QL MICRO: ABNORMAL /HPF (ref 0–4)

## 2018-11-12 PROCEDURE — 74011636320 HC RX REV CODE- 636/320: Performed by: EMERGENCY MEDICINE

## 2018-11-12 PROCEDURE — 93005 ELECTROCARDIOGRAM TRACING: CPT

## 2018-11-12 PROCEDURE — 80076 HEPATIC FUNCTION PANEL: CPT

## 2018-11-12 PROCEDURE — 74177 CT ABD & PELVIS W/CONTRAST: CPT

## 2018-11-12 PROCEDURE — 99284 EMERGENCY DEPT VISIT MOD MDM: CPT

## 2018-11-12 PROCEDURE — 83605 ASSAY OF LACTIC ACID: CPT

## 2018-11-12 PROCEDURE — 74011250637 HC RX REV CODE- 250/637: Performed by: EMERGENCY MEDICINE

## 2018-11-12 PROCEDURE — 81001 URINALYSIS AUTO W/SCOPE: CPT

## 2018-11-12 PROCEDURE — 96374 THER/PROPH/DIAG INJ IV PUSH: CPT

## 2018-11-12 PROCEDURE — 80048 BASIC METABOLIC PNL TOTAL CA: CPT

## 2018-11-12 PROCEDURE — 74011250636 HC RX REV CODE- 250/636: Performed by: EMERGENCY MEDICINE

## 2018-11-12 PROCEDURE — 85025 COMPLETE CBC W/AUTO DIFF WBC: CPT

## 2018-11-12 PROCEDURE — 83690 ASSAY OF LIPASE: CPT

## 2018-11-12 PROCEDURE — 96361 HYDRATE IV INFUSION ADD-ON: CPT

## 2018-11-12 PROCEDURE — 82553 CREATINE MB FRACTION: CPT

## 2018-11-12 PROCEDURE — 96375 TX/PRO/DX INJ NEW DRUG ADDON: CPT

## 2018-11-12 RX ORDER — ONDANSETRON 4 MG/1
4 TABLET, ORALLY DISINTEGRATING ORAL
Qty: 14 TAB | Refills: 0 | Status: SHIPPED | OUTPATIENT
Start: 2018-11-12 | End: 2019-06-09

## 2018-11-12 RX ORDER — METRONIDAZOLE 500 MG/1
500 TABLET ORAL 2 TIMES DAILY
Qty: 20 TAB | Refills: 0 | Status: SHIPPED | OUTPATIENT
Start: 2018-11-12 | End: 2018-11-22

## 2018-11-12 RX ORDER — CIPROFLOXACIN 500 MG/1
500 TABLET ORAL
Status: COMPLETED | OUTPATIENT
Start: 2018-11-12 | End: 2018-11-12

## 2018-11-12 RX ORDER — ONDANSETRON 4 MG/1
4 TABLET, FILM COATED ORAL
Status: COMPLETED | OUTPATIENT
Start: 2018-11-12 | End: 2018-11-12

## 2018-11-12 RX ORDER — ONDANSETRON 2 MG/ML
4 INJECTION INTRAMUSCULAR; INTRAVENOUS
Status: COMPLETED | OUTPATIENT
Start: 2018-11-12 | End: 2018-11-12

## 2018-11-12 RX ORDER — HYDROCODONE BITARTRATE AND ACETAMINOPHEN 5; 325 MG/1; MG/1
1 TABLET ORAL
Qty: 16 TAB | Refills: 0 | Status: SHIPPED | OUTPATIENT
Start: 2018-11-12 | End: 2018-11-14

## 2018-11-12 RX ORDER — FAMOTIDINE 10 MG/ML
20 INJECTION INTRAVENOUS
Status: COMPLETED | OUTPATIENT
Start: 2018-11-12 | End: 2018-11-12

## 2018-11-12 RX ORDER — CIPROFLOXACIN 500 MG/1
500 TABLET ORAL 2 TIMES DAILY
Qty: 20 TAB | Refills: 0 | Status: SHIPPED | OUTPATIENT
Start: 2018-11-12 | End: 2018-11-22

## 2018-11-12 RX ORDER — MORPHINE SULFATE 4 MG/ML
4 INJECTION INTRAVENOUS
Status: COMPLETED | OUTPATIENT
Start: 2018-11-12 | End: 2018-11-12

## 2018-11-12 RX ORDER — METRONIDAZOLE 500 MG/1
500 TABLET ORAL
Status: COMPLETED | OUTPATIENT
Start: 2018-11-12 | End: 2018-11-12

## 2018-11-12 RX ORDER — HYDROCODONE BITARTRATE AND ACETAMINOPHEN 5; 325 MG/1; MG/1
1 TABLET ORAL
Status: COMPLETED | OUTPATIENT
Start: 2018-11-12 | End: 2018-11-12

## 2018-11-12 RX ORDER — SODIUM CHLORIDE 9 MG/ML
1000 INJECTION, SOLUTION INTRAVENOUS ONCE
Status: COMPLETED | OUTPATIENT
Start: 2018-11-12 | End: 2018-11-12

## 2018-11-12 RX ADMIN — MORPHINE SULFATE 4 MG: 4 INJECTION INTRAVENOUS at 21:08

## 2018-11-12 RX ADMIN — ONDANSETRON HYDROCHLORIDE 4 MG: 4 TABLET, FILM COATED ORAL at 22:33

## 2018-11-12 RX ADMIN — IOPAMIDOL 80 ML: 612 INJECTION, SOLUTION INTRAVENOUS at 21:27

## 2018-11-12 RX ADMIN — FAMOTIDINE 20 MG: 10 INJECTION, SOLUTION INTRAVENOUS at 21:07

## 2018-11-12 RX ADMIN — CIPROFLOXACIN HYDROCHLORIDE 500 MG: 500 TABLET, FILM COATED ORAL at 22:33

## 2018-11-12 RX ADMIN — ONDANSETRON 4 MG: 2 INJECTION INTRAMUSCULAR; INTRAVENOUS at 21:07

## 2018-11-12 RX ADMIN — HYDROCODONE BITARTRATE AND ACETAMINOPHEN 1 TABLET: 5; 325 TABLET ORAL at 22:33

## 2018-11-12 RX ADMIN — SODIUM CHLORIDE 1000 ML: 900 INJECTION, SOLUTION INTRAVENOUS at 21:08

## 2018-11-12 RX ADMIN — METRONIDAZOLE 500 MG: 500 TABLET ORAL at 22:33

## 2018-11-13 LAB
ATRIAL RATE: 89 BPM
CALCULATED P AXIS, ECG09: 47 DEGREES
CALCULATED R AXIS, ECG10: 15 DEGREES
CALCULATED T AXIS, ECG11: 22 DEGREES
DIAGNOSIS, 93000: NORMAL
P-R INTERVAL, ECG05: 150 MS
Q-T INTERVAL, ECG07: 350 MS
QRS DURATION, ECG06: 66 MS
QTC CALCULATION (BEZET), ECG08: 425 MS
VENTRICULAR RATE, ECG03: 89 BPM

## 2018-11-13 NOTE — ED PROVIDER NOTES
Pt c/o b/l low abd pain, x one week. 8/10, severe, waxing/waning. C/o constipation, but says large mucousy diarrhea each day, lg amt x 1-2. No blood. No chest pain. Fatigue but no sob. No leg pain or swelling. No fever. No cough. No back pain. No urinary changes. No meds taken pta. Past Medical History:  
Diagnosis Date  Depression Past Surgical History:  
Procedure Laterality Date  HX COLONOSCOPY  2007  
 neg Family History:  
Problem Relation Age of Onset  Hypertension Father  Heart Disease Father Social History Socioeconomic History  Marital status:  Spouse name: Not on file  Number of children: Not on file  Years of education: Not on file  Highest education level: Not on file Social Needs  Financial resource strain: Not on file  Food insecurity - worry: Not on file  Food insecurity - inability: Not on file  Transportation needs - medical: Not on file  Transportation needs - non-medical: Not on file Occupational History  Not on file Tobacco Use  Smoking status: Never Smoker  Smokeless tobacco: Never Used Substance and Sexual Activity  Alcohol use: Yes  Drug use: No  
 Sexual activity: Not on file Other Topics Concern  Not on file Social History Narrative  Not on file ALLERGIES: Septra [sulfamethoprim ds] Review of Systems Constitutional: Negative for fever. HENT: Negative for congestion. Respiratory: Negative for cough and shortness of breath. Cardiovascular: Negative for chest pain. Gastrointestinal: Positive for abdominal pain, diarrhea and nausea. Negative for blood in stool. Musculoskeletal: Negative for back pain. Skin: Negative for rash. Neurological: Negative for light-headedness. All other systems reviewed and are negative. Vitals:  
 11/12/18 2025 11/12/18 2230 BP: 129/73 131/69 Pulse: 100 97 Resp: 16 18  
 Temp: 100.2 °F (37.9 °C) 99.2 °F (37.3 °C) SpO2: 100% 94% Weight: 55.8 kg (123 lb) Height: 5' (1.524 m) Physical Exam  
Constitutional: She is oriented to person, place, and time. She appears well-developed. HENT:  
Head: Normocephalic. Mouth/Throat: Oropharynx is clear and moist.  
Eyes: Pupils are equal, round, and reactive to light. Neck: Normal range of motion. Cardiovascular: Regular rhythm. No murmur heard. Pulmonary/Chest: Effort normal. She has no wheezes. Abdominal: Soft. There is tenderness (+ b/l low abd ttp). Musculoskeletal: Normal range of motion. She exhibits no tenderness. Neurological: She is alert and oriented to person, place, and time. Skin: No rash noted. Nursing note and vitals reviewed. MDM Procedures Vitals: 
Patient Vitals for the past 12 hrs: 
 Temp Pulse Resp BP SpO2  
11/12/18 2230 99.2 °F (37.3 °C) 97 18 131/69 94 % 11/12/18 2025 100.2 °F (37.9 °C) 100 16 129/73 100 % Medications ordered:  
Medications  
0.9% sodium chloride infusion 1,000 mL (0 mL IntraVENous IV Completed 11/12/18 2222) [facility] injection 4 mg (4 mg IntraVENous Given 11/12/18 2107) morphine injection 4 mg (4 mg IntraVENous Given 11/12/18 2108) famotidine (PF) (PEPCID) injection 20 mg (20 mg IntraVENous Given 11/12/18 2107) iopamidol (ISOVUE 300) 61 % contrast injection  mL (80 mL IntraVENous Given 11/12/18 2127)  
ciprofloxacin HCl (CIPRO) tablet 500 mg (500 mg Oral Given 11/12/18 2233) metroNIDAZOLE (FLAGYL) tablet 500 mg (500 mg Oral Given 11/12/18 2233) HYDROcodone-acetaminophen (NORCO) 5-325 mg per tablet 1 Tab (1 Tab Oral Given 11/12/18 2233) ondansetron hcl (ZOFRAN) tablet 4 mg (4 mg Oral Given 11/12/18 2233) Lab findings: 
Recent Results (from the past 12 hour(s)) URINALYSIS W/ RFLX MICROSCOPIC Collection Time: 11/12/18  8:39 PM  
Result Value Ref Range Color ORANGE Appearance CLOUDY Specific gravity 1.015 1.005 - 1.030    
 pH (UA) 5.5 5.0 - 8.0 Protein 100 (A) NEG mg/dL Glucose NEGATIVE  NEG mg/dL Ketone 15 (A) NEG mg/dL Bilirubin NEGATIVE  NEG Blood LARGE (A) NEG Urobilinogen 1.0 0.2 - 1.0 EU/dL Nitrites NEGATIVE  NEG Leukocyte Esterase TRACE (A) NEG URINE MICROSCOPIC ONLY Collection Time: 11/12/18  8:39 PM  
Result Value Ref Range WBC 0 to 3 0 - 4 /hpf  
 RBC TOO NUMEROUS TO COUNT 0 - 5 /hpf Epithelial cells FEW 0 - 5 /lpf Bacteria FEW (A) NEG /hpf Mucus FEW (A) NEG /lpf EKG, 12 LEAD, INITIAL Collection Time: 11/12/18  8:58 PM  
Result Value Ref Range Ventricular Rate 89 BPM  
 Atrial Rate 89 BPM  
 P-R Interval 150 ms QRS Duration 66 ms  
 Q-T Interval 350 ms QTC Calculation (Bezet) 425 ms Calculated P Axis 47 degrees Calculated R Axis 15 degrees Calculated T Axis 22 degrees Diagnosis Normal sinus rhythm Possible Left atrial enlargement Nonspecific ST and T wave abnormality Abnormal ECG No previous ECGs available CBC WITH AUTOMATED DIFF Collection Time: 11/12/18  8:59 PM  
Result Value Ref Range WBC 12.7 4.6 - 13.2 K/uL  
 RBC 4.03 (L) 4.20 - 5.30 M/uL  
 HGB 12.5 12.0 - 16.0 g/dL HCT 36.3 35.0 - 45.0 % MCV 90.1 74.0 - 97.0 FL  
 MCH 31.0 24.0 - 34.0 PG  
 MCHC 34.4 31.0 - 37.0 g/dL  
 RDW 11.5 (L) 11.6 - 14.5 % PLATELET 114 014 - 694 K/uL MPV 8.9 (L) 9.2 - 11.8 FL  
 NEUTROPHILS 78 (H) 40 - 73 % LYMPHOCYTES 11 (L) 21 - 52 % MONOCYTES 11 (H) 3 - 10 % EOSINOPHILS 0 0 - 5 % BASOPHILS 0 0 - 2 %  
 ABS. NEUTROPHILS 9.8 (H) 1.8 - 8.0 K/UL  
 ABS. LYMPHOCYTES 1.4 0.9 - 3.6 K/UL  
 ABS. MONOCYTES 1.4 (H) 0.05 - 1.2 K/UL  
 ABS. EOSINOPHILS 0.1 0.0 - 0.4 K/UL  
 ABS. BASOPHILS 0.1 0.0 - 0.1 K/UL  
 DF AUTOMATED METABOLIC PANEL, BASIC Collection Time: 11/12/18  8:59 PM  
Result Value Ref Range Sodium 134 (L) 136 - 145 mmol/L  Potassium 3.7 3.5 - 5.5 mmol/L  
 Chloride 98 (L) 100 - 108 mmol/L  
 CO2 24 21 - 32 mmol/L Anion gap 12 3.0 - 18 mmol/L Glucose 109 (H) 74 - 99 mg/dL BUN 14 7.0 - 18 MG/DL Creatinine 0.96 0.6 - 1.3 MG/DL  
 BUN/Creatinine ratio 15 12 - 20 GFR est AA >60 >60 ml/min/1.73m2 GFR est non-AA 58 (L) >60 ml/min/1.73m2 Calcium 8.7 8.5 - 10.1 MG/DL  
LIPASE Collection Time: 11/12/18  8:59 PM  
Result Value Ref Range Lipase 161 73 - 393 U/L  
HEPATIC FUNCTION PANEL Collection Time: 11/12/18  8:59 PM  
Result Value Ref Range Protein, total 7.9 6.4 - 8.2 g/dL Albumin 2.9 (L) 3.4 - 5.0 g/dL Globulin 5.0 (H) 2.0 - 4.0 g/dL A-G Ratio 0.6 (L) 0.8 - 1.7 Bilirubin, total 0.4 0.2 - 1.0 MG/DL Bilirubin, direct 0.1 0.0 - 0.2 MG/DL Alk. phosphatase 89 45 - 117 U/L  
 AST (SGOT) 25 15 - 37 U/L  
 ALT (SGPT) 19 13 - 56 U/L  
CARDIAC PANEL,(CK, CKMB & TROPONIN) Collection Time: 11/12/18  8:59 PM  
Result Value Ref Range CK 30 26 - 192 U/L  
 CK - MB <1.0 <3.6 ng/ml CK-MB Index  0.0 - 4.0 % CALCULATION NOT PERFORMED WHEN RESULT IS BELOW LINEAR LIMIT Troponin-I, Qt. <0.02 0.00 - 0.06 NG/ML  
POC LACTIC ACID Collection Time: 11/12/18  9:00 PM  
Result Value Ref Range Lactic Acid (POC) 1.25 0.40 - 2.00 mmol/L  
 
 
 
X-Ray, CT or other radiology findings or impressions: 
CT ABD PELV W CONT Final Result Progress notes, Consult notes or additional Procedure notes:  
Mild pain cont but marked improvement. Pt declines further pain tx. Af, nl vitals. No so perf/obs/acute abd. Stable for dc w abx tx. Detailed return inst given. Pt verbalizes understanding. Diagnosis: 1. Diverticulitis Disposition: home Follow-up Information Follow up With Specialties Details Why Contact Info Ata Wiggins MD Internal Medicine Schedule an appointment as soon as possible for a visit in 2 days  [address] [address] [address] [facility] [address] [phone] Claudia Metcalf MD Gastroenterology Schedule an appointment as soon as possible for a visit in 2 days  [address] [place] [address] [address] [facility] [address] [place] [address] 
[phone] Medication List  
  
START taking these medications   
ciprofloxacin HCl 500 mg tablet Commonly known as:  CIPRO Take 1 Tab by mouth two (2) times a day for 10 days. HYDROcodone-acetaminophen 5-325 mg per tablet Commonly known as:  Jenny Belt Take 1 Tab by mouth every six (6) hours as needed for Pain. Max Daily Amount: 4 Tabs. metroNIDAZOLE 500 mg tablet Commonly known as:  FLAGYL Take 1 Tab by mouth two (2) times a day for 10 days. ondansetron 4 mg disintegrating tablet Commonly known as:  ZOFRAN ODT Take 1 Tab by mouth every eight (8) hours as needed for Nausea. ASK your doctor about these medications   
cetirizine 10 mg tablet Commonly known as:  ZyrTEC Take 1 Tab by mouth daily. triamcinolone acetonide 0.1 % topical cream 
Commonly known as:  KENALOG Apply  to affected area two (2) times a day. use thin layer for up to 2 weeks. venlafaxine 25 mg tablet Commonly known as:  [facility] Take 0.5 Tabs by mouth two (2) times a day. Where to Get Your Medications Information about where to get these medications is not yet available Ask your nurse or doctor about these medications · ciprofloxacin HCl 500 mg tablet · HYDROcodone-acetaminophen 5-325 mg per tablet · metroNIDAZOLE 500 mg tablet · ondansetron 4 mg disintegrating tablet

## 2018-11-13 NOTE — DISCHARGE INSTRUCTIONS
Return for pain, fever not resolving with motrin or tylenol, shortness of breath, vomiting, decreased fluid intake, weakness, numbness, dizziness, or any change or concerns. Diverticulitis: Care Instructions  Your Care Instructions    Diverticulitis occurs when pouches form in the wall of the colon and become inflamed or infected. It can be very painful. Doctors aren't sure what causes diverticulitis. There is no proof that foods such as nuts, seeds, or berries cause it or make it worse. A low-fiber diet may cause the colon to work harder to push stool forward. Pouches may form because of this extra work. It may be hard to think about healthy eating while you're in pain. But as you recover, you might think about how you can use healthy eating for overall better health. Healthy eating may help you avoid future attacks. Follow-up care is a key part of your treatment and safety. Be sure to make and go to all appointments, and call your doctor if you are having problems. It's also a good idea to know your test results and keep a list of the medicines you take. How can you care for yourself at home? · Drink plenty of fluids, enough so that your urine is light yellow or clear like water. If you have kidney, heart, or liver disease and have to limit fluids, talk with your doctor before you increase the amount of fluids you drink. · Stick to liquids or a bland diet (plain rice, bananas, dry toast or crackers, applesauce) until you are feeling better. Then you can return to regular foods and gradually increase the amount of fiber in your diet. · Use a heating pad set on low on your belly to relieve mild cramps and pain. · Get extra rest until you are feeling better. · Be safe with medicines. Read and follow all instructions on the label. ? If the doctor gave you a prescription medicine for pain, take it as prescribed.   ? If you are not taking a prescription pain medicine, ask your doctor if you can take an over-the-counter medicine. · If your doctor prescribed antibiotics, take them as directed. Do not stop taking them just because you feel better. You need to take the full course of antibiotics. To prevent future attacks of diverticulitis  · Avoid constipation:  ? Include fruits, vegetables, beans, and whole grains in your diet each day. These foods are high in fiber. ? Drink plenty of fluids, enough so that your urine is light yellow or clear like water. If you have kidney, heart, or liver disease and have to limit fluids, talk with your doctor before you increase the amount of fluids you drink. ? Get some exercise every day. Build up slowly to 30 to 60 minutes a day on 5 or more days of the week. ? Take a fiber supplement, such as Citrucel or Metamucil, every day if needed. Read and follow all instructions on the label. ? Schedule time each day for a bowel movement. Having a daily routine may help. Take your time and do not strain when having a bowel movement. When should you call for help? Call your doctor now or seek immediate medical care if:    · You have a fever.     · You are vomiting.     · You have new or worse belly pain.     · You cannot pass stools or gas.    Watch closely for changes in your health, and be sure to contact your doctor if you have any problems. Where can you learn more? Go to http://ronald-hung.info/. Enter H901 in the search box to learn more about \"Diverticulitis: Care Instructions. \"  Current as of: March 28, 2018  Content Version: 11.8  © 1968-1288 [employer]. Care instructions adapted under license by [employer] (which disclaims liability or warranty for this information). If you have questions about a medical condition or this instruction, always ask your healthcare professional. [address] any warranty or liability for your use of this information.

## 2018-11-13 NOTE — ED TRIAGE NOTES
Patient presents to ER C/O abdominal pain/cramping x 1 week. States she was pooping clear mucus for 5 days. States pain worsened today.

## 2018-11-13 NOTE — ED NOTES
I have reviewed discharge instructions with the patient. The patient verbalized understanding. Patient armband removed and shredded Current Discharge Medication List  
  
START taking these medications Details  
ondansetron (ZOFRAN ODT) 4 mg disintegrating tablet Take 1 Tab by mouth every eight (8) hours as needed for Nausea. Qty: 14 Tab, Refills: 0 HYDROcodone-acetaminophen (NORCO) 5-325 mg per tablet Take 1 Tab by mouth every six (6) hours as needed for Pain. Max Daily Amount: 4 Tabs. Qty: 16 Tab, Refills: 0 Associated Diagnoses: Diverticulitis  
  
ciprofloxacin HCl (CIPRO) 500 mg tablet Take 1 Tab by mouth two (2) times a day for 10 days. Qty: 20 Tab, Refills: 0  
  
metroNIDAZOLE (FLAGYL) 500 mg tablet Take 1 Tab by mouth two (2) times a day for 10 days. Qty: 20 Tab, Refills: 0

## 2018-11-14 ENCOUNTER — OFFICE VISIT (OUTPATIENT)
Dept: INTERNAL MEDICINE CLINIC | Age: 68
End: 2018-11-14

## 2018-11-14 VITALS
RESPIRATION RATE: 16 BRPM | HEART RATE: 88 BPM | SYSTOLIC BLOOD PRESSURE: 100 MMHG | OXYGEN SATURATION: 98 % | HEIGHT: 60 IN | TEMPERATURE: 97.8 F | BODY MASS INDEX: 24.74 KG/M2 | DIASTOLIC BLOOD PRESSURE: 58 MMHG | WEIGHT: 126 LBS

## 2018-11-14 DIAGNOSIS — Z23 ENCOUNTER FOR IMMUNIZATION: ICD-10-CM

## 2018-11-14 DIAGNOSIS — K57.32 DIVERTICULITIS OF LARGE INTESTINE WITHOUT PERFORATION OR ABSCESS WITHOUT BLEEDING: Primary | ICD-10-CM

## 2018-11-14 NOTE — PROGRESS NOTES
HPI:   Seen 11/12/18 in ER for eval of 1 week hx of abd pain, loose stools, fever w/o bleeding  CT revealed sigmoid diverticulitis  Given cipro/flagyl with improvement  Last colo nl 2007    ROS is otherwise negative. Past Medical History:   Diagnosis Date    Depression        Past Surgical History:   Procedure Laterality Date    HX COLONOSCOPY  2007    neg       Social History     Socioeconomic History    Marital status:      Spouse name: Not on file    Number of children: Not on file    Years of education: Not on file    Highest education level: Not on file   Social Needs    Financial resource strain: Not on file    Food insecurity - worry: Not on file    Food insecurity - inability: Not on file    Transportation needs - medical: Not on file   [employer] needs - non-medical: Not on file   Occupational History    Not on file   Tobacco Use    Smoking status: Never Smoker    Smokeless tobacco: Never Used   Substance and Sexual Activity    Alcohol use: Yes    Drug use: No    Sexual activity: Not on file   Other Topics Concern    Not on file   Social History Narrative    Not on file       Allergies   Allergen Reactions    Septra [Sulfamethoprim Ds] Rash       Family History   Problem Relation Age of Onset    Hypertension Father     Heart Disease Father        Current Outpatient Medications   Medication Sig Dispense Refill    ondansetron (ZOFRAN ODT) 4 mg disintegrating tablet Take 1 Tab by mouth every eight (8) hours as needed for Nausea. 14 Tab 0    ciprofloxacin HCl (CIPRO) 500 mg tablet Take 1 Tab by mouth two (2) times a day for 10 days. 20 Tab 0    metroNIDAZOLE (FLAGYL) 500 mg tablet Take 1 Tab by mouth two (2) times a day for 10 days. 20 Tab 0    venlafaxine (EFFEXOR) 25 mg tablet Take 0.5 Tabs by mouth two (2) times a day. 30 Tab 12    triamcinolone acetonide (KENALOG) 0.1 % topical cream Apply  to affected area two (2) times a day. use thin layer for up to 2 weeks.  15 g 1    cetirizine (ZYRTEC) 10 mg tablet Take 1 Tab by mouth daily. (Patient taking differently: Take 10 mg by mouth daily as needed.) 30 Tab 3           Visit Vitals  /58 (BP 1 Location: Left arm, BP Patient Position: Sitting)   Pulse 88   Temp 97.8 °F (36.6 °C) (Tympanic)   Resp 16   Ht 5' (1.524 m)   Wt 126 lb (57.2 kg)   SpO2 98%   BMI 24.61 kg/m²       PE  Well nourished in NAD  HEENT:  OP: clear. Neck: supple w/o mass or bruits. Chest: clear. CV: RRR w/o m,r,g; pulses intact. Abd: +BS,soft, mild LLQ tenderness w/o rebound or guarding, w/o HSM or mass. Ext: w/o edema. Neuro: NF. Assessment and Plan    Encounter Diagnoses   Name Primary?     Diverticulitis of large intestine without perforation or abscess without bleeding Yes    Encounter for immunization        Diverticulitis - GI consult advised  Finish abx rx; improving  F/U worsening or unimproved 7 days  OV 3 mos or prn  I have explained plan to patient and the patient verbalizes understanding

## 2018-11-14 NOTE — PATIENT INSTRUCTIONS

## 2019-01-25 ENCOUNTER — HOSPITAL ENCOUNTER (OUTPATIENT)
Dept: PHYSICAL THERAPY | Age: 69
Discharge: HOME | End: 2019-01-25
Payer: MEDICARE

## 2019-01-25 PROCEDURE — 97161 PT EVAL LOW COMPLEX 20 MIN: CPT

## 2019-01-25 PROCEDURE — 97110 THERAPEUTIC EXERCISES: CPT

## 2019-01-25 NOTE — PROGRESS NOTES
PT DAILY TREATMENT NOTE - [facility]     Patient Name: Johnson Mallory  Date:2019  : 1950  [x]  Patient  Verified  Payor: Mohinder Rather / Plan: [employer] / Product Type: HMO /    In time:2:05  Out time:2:49  Total Treatment Time (min): 44   Visit #: 1 of 24    Medicare/BCBS Only   Total Timed Codes (min):  15 1:1 Treatment Time:  44     Treatment Area: Pain in right wrist [M25.531]    SUBJECTIVE  Pain Level (0-10 scale): 4  Any medication changes, allergies to medications, adverse drug reactions, diagnosis change, or new procedure performed?: [x] No    [] Yes (see summary sheet for update)  Subjective functional status/changes:   [] No changes reported  See POC    OBJECTIVE    29 min [x]Eval                  []Re-Eval     15 min Therapeutic Exercise:  [] See flow sheet : HEP instruction and demonstration, pt education regarding anatomy and physiology of the UEs and how it relates to the pt's condition. Rationale: increase ROM and increase strength to improve the patients ability to tolerate ADLs          With   [] TE   [] TA   [] neuro   [] other: Patient Education: [x] Review HEP    [] Progressed/Changed HEP based on:   [] positioning   [] body mechanics   [] transfers   [] heat/ice application    [] other:      Other Objective/Functional Measures: See evaluation. Pain Level (0-10 scale) post treatment: 0    ASSESSMENT/Changes in Function: Pt given HEP handout to perform. Pt understood exercises in HEP handout. Educated pt that she can use ice as needed for 15-20 mins for pain and swelling. Educated pt on signs/symptoms of a DVT. Pt demonstrated decreased AROM/PROM, decreased strength, increased swelling in the right hand, decreased  strength, and decreased distal>proximal scar mobility. Pt would benefit from physical therapy to improve the above impairments to help the pt return to performing ADLs and functional activities.      Patient will continue to benefit from skilled PT services to modify and progress therapeutic interventions, address functional mobility deficits, address ROM deficits, address strength deficits, analyze and address soft tissue restrictions, analyze and cue movement patterns, analyze and modify body mechanics/ergonomics, assess and modify postural abnormalities and instruct in home and community integration to attain remaining goals. [x]  See Plan of Care  []  See progress note/recertification  []  See Discharge Summary         Progress towards goals / Updated goals:  Short Term Goals: To be accomplished in 2 treatments:  1. Pt will report compliance and independence to [facility] to help the pt manage their pain and symptoms. Eval: Established   Current: n/a  Long Term Goals: To be accomplished in 24 treatments:  1. Pt will increase FOTO score to 63 points to improve ability to perform ADLs. Eval: 63 points  Current: n/a  2. Pt will increase PROM right wrist flex to 45 degs, EXT to at least 40 degs to improve ability to tolerate dressing activities. Eval: flex 32 degs, EXT 23 degs; both with increased right anterior wrist pain  Current: n/a  3. Pt will increase AROM right wrist flex to at least 30 degs, EXT to at least 20 degs, supination to [facility] to improve ability to tolerate functional tasks. Eval: flex 8 degs, EXT neutral, supination 57 degs; all with increased right anterior wrist pain  Current: n/a  4. Pt will report being able to drive and  the steering wheel with the right hand without the brace on her right wrist to improve ease of driving.    Eval: reports using her brace with driving and has trouble gripping    PLAN  [x]  Upgrade activities as tolerated     [x]  Continue plan of care  [x]  Update interventions per flow sheet       []  Discharge due to:_  []  Other:_      Derrick Mir, PT 1/25/2019  3:14 PM    Future Appointments   Date Time Provider Manasa Meza   1/25/2019  2:00 PM Bobbi Blunt, PT [facility]

## 2019-01-25 NOTE — PROGRESS NOTES
[facility] [address], [address] [place] [address], [address],[place] [address] [phone] fax    Plan of Care/ Statement of Necessity for Physical Therapy Services  Patient name: Rachael Ivy Start of Care: 2019   Referral source: Sera Zuleta, [place] : 1950    Medical Diagnosis: Pain in right wrist [M25.531]   Onset Date: DOS 2018, initial injury 2018    Treatment Diagnosis: right wrist pain   Prior Hospitalization: see medical history Provider#: [id number]   Medications: Verified on Patient summary List    Comorbidities: reports sustaining a chip fracture in the left elbow from the same fall (no surgery was performed)   Prior Level of Function: Independent with ADls and functional tasks with no limitations prior to onset. Pt is right-handed. The Plan of Care and following information is based on the information from the initial evaluation. Assessment/ key information:   Pt is a 79year old female who presents to therapy today a/p right distal radius ORIF DOS 2018. Pt reports that she fell on stairs on 2018, which resulted in the injury. Pt reports that the MD stated she can start to wean off the brace. Pt reports having limited ROM, mobility and has increased pain at times in the right wrist region. Pt demonstrated decreased AROM/PROM, decreased strength, increased swelling in the right hand, decreased  strength, and decreased distal>proximal scar mobility. Pt would benefit from physical therapy to improve the above impairments to help the pt return to performing ADLs and functional activities.      Evaluation Complexity History LOW Complexity : Zero comorbidities / personal factors that will impact the outcome / POC; Examination HIGH Complexity : 4+ Standardized tests and measures addressing body structure, function, activity limitation and / or participation in recreation  ;Presentation LOW Complexity : Stable, uncomplicated ;Clinical Decision Making MEDIUM Complexity : FOTO score of 26-74  Overall Complexity Rating: LOW   Problem List: pain affecting function, decrease ROM, decrease strength, edema affecting function, decrease ADL/ functional abilitiies, decrease activity tolerance, decrease flexibility/ joint mobility and decrease transfer abilities   Treatment Plan may include any combination of the following: Therapeutic exercise, Therapeutic activities, Neuromuscular re-education, Physical agent/modality, Manual therapy, Patient education, Self Care training, Functional mobility training and Home safety training  Patient / Family readiness to learn indicated by: asking questions, trying to perform skills and interest  Persons(s) to be included in education: patient (P)  Barriers to Learning/Limitations: None  Patient Goal (s): painless movement  Patient Self Reported Health Status: excellent  Rehabilitation Potential: good    Short Term Goals: To be accomplished in 2 treatments:  1. Pt will report compliance and independence to [facility] to help the pt manage their pain and symptoms. Long Term Goals: To be accomplished in 24 treatments:  1. Pt will increase FOTO score to 63 points to improve ability to perform ADLs. 2. Pt will increase PROM right wrist flex to 45 degs, EXT to at least 40 degs to improve ability to tolerate dressing activities. 3. Pt will increase AROM right wrist flex to at least 30 degs, EXT to at least 20 degs, supination to [facility] to improve ability to tolerate functional tasks. 4. Pt will report being able to drive and  the steering wheel with the right hand without the brace on her right wrist to improve ease of driving. Frequency / Duration: Patient to be seen 2-3 times per week for 24 weeks.     Patient/ Caregiver education and instruction: Diagnosis, prognosis, self care, activity modification, brace/ splint application and exercises   [x]  Plan of care has been reviewed with PTA    Certification Period: 1/25/2019 - 3/25/2019  Danitza Mayco, PT 1/25/2019 3:12 PM  _____________________________________________________________________  I certify that the above Therapy Services are being furnished while the patient is under my care. I agree with the treatment plan and certify that this therapy is necessary.     Physician's Signature:____________________  Date:__________Time:______    Please sign and return to In [address] [place] [address] [address]  [address] [place] [address], [address] [place] [address], [address] [facility] [address],[facility] 300  [phone] fax

## 2019-01-29 ENCOUNTER — HOSPITAL ENCOUNTER (OUTPATIENT)
Dept: PHYSICAL THERAPY | Age: 69
Discharge: HOME | End: 2019-01-29
Payer: MEDICARE

## 2019-01-29 PROCEDURE — 97110 THERAPEUTIC EXERCISES: CPT

## 2019-01-29 PROCEDURE — 97140 MANUAL THERAPY 1/> REGIONS: CPT

## 2019-01-29 NOTE — PROGRESS NOTES
PT DAILY TREATMENT NOTE 10-18    Patient Name: Rachael Ivy  Date:2019  : 1950  [x]  Patient  Verified  Payor: Ramu Keating / Plan: [place] MEDICARE PART A & B / Product Type: Medicare /    In time:3:06  Out time:3:53  Total Treatment Time (min): 47  Visit #: 2 of 24    Medicare/BCBS Only   Total Timed Codes (min):  37 1:1 Treatment Time:  30       Treatment Area: Pain in right wrist [M25.531]    SUBJECTIVE  Pain Level (0-10 scale):  2  Any medication changes, allergies to medications, adverse drug reactions, diagnosis change, or new procedure performed?: [x] No    [] Yes (see summary sheet for update)  Subjective functional status/changes:   [] No changes reported  States she has been doing her homework, it is sore    OBJECTIVE    Modality rationale: decrease edema, decrease inflammation and decrease pain to improve the patients ability to ease with tolerance to ADLs   Min Type Additional Details    [] Estim:  []Unatt       []IFC  []Premod                        []Other:  []w/ice   []w/heat  Position:  Location:    [] Estim: []Att    []TENS instruct  []NMES                    []Other:  []w/US   []w/ice   []w/heat  Position:  Location:    []  Traction: [] Cervical       []Lumbar                       [] Prone          []Supine                       []Intermittent   []Continuous Lbs:  [] before manual  [] after manual    []  Ultrasound: []Continuous   [] Pulsed                           []1MHz   []3MHz W/cm2:  Location:    []  Iontophoresis with dexamethasone         Location: [] Take home patch   [] In clinic   10 [x]  Ice     []  heat  []  Ice massage  []  Laser   []  Anodyne Position:incline  Location: right wrist     []  Laser with stim  []  Other:  Position:  Location:    []  Vasopneumatic Device Pressure:       [] lo [] med [] hi   Temperature: [] lo [] med [] hi   [] Skin assessment post-treatment:  []intact []redness- no adverse reaction    []redness - adverse reaction:         27 min Therapeutic Exercise:  [x] See flow sheet :   Rationale: increase ROM, increase strength and increase proprioception to improve the patients ability to perform daily household chores. 10 min Manual Therapy:  PROM with overpressure, flexion/ extension/ pronation/ supination/ radial deviation/ ulnar deviation   Rationale: decrease pain, increase ROM and increase tissue extensibility to assist with daily personal hygiene tasks              With   [] TE   [] TA   [] neuro   [] other: Patient Education: [x] Review HEP    [] Progressed/Changed HEP based on:   [] positioning   [] body mechanics   [] transfers   [] heat/ice application    [] other:      Other Objective/Functional Measures: Tolerated initiation of therex well- reports of pain/ discomfort with pronation activities  Reports compliance with HEP     Pain Level (0-10 scale) post treatment: 2    ASSESSMENT/Changes in Function: Patient continues to show improvements with signs/ symptoms however still demonstrates a decrease in strength, impaired ROM,  and an increase in pain with functional activities. Patient will continue to benefit from skilled PT services to modify and progress therapeutic interventions, address functional mobility deficits, address ROM deficits and address strength deficits to attain remaining goals. [x]  See Plan of Care  []  See progress note/recertification  []  See Discharge Summary         Progress towards goals / Updated goals:    Short Term Goals: To be accomplished in 2 treatments:  1. Pt will report compliance and independence to HEP to help the pt manage their pain and symptoms. Eval: Established          Current:MET   Long Term Goals: To be accomplished in 24 treatments:  1. Pt will increase FOTO score to 63 points to improve ability to perform ADLs. Eval: 63 points  Current: n/a  2.  Pt will increase PROM right wrist flex to 45 degs, EXT to at least 40 degs to improve ability to tolerate dressing activities. Eval: flex 32 degs, EXT 23 degs; both with increased right anterior wrist pain  Current: n/a  3. Pt will increase AROM right wrist flex to at least 30 degs, EXT to at least 20 degs, supination to [facility] to improve ability to tolerate functional tasks. Eval: flex 8 degs, EXT neutral, supination 57 degs; all with increased right anterior wrist pain  Current: n/a  4. Pt will report being able to drive and  the steering wheel with the right hand without the brace on her right wrist to improve ease of driving.    Eval: reports using her brace with driving and has trouble gripping        PLAN  [x]  Upgrade activities as tolerated     []  Continue plan of care  []  Update interventions per flow sheet       []  Discharge due to:_  []  Other:_      Alissa Montano, PT 1/29/2019  7:41 AM    Future Appointments   Date Time Provider Manasa Meza   1/29/2019  3:00 PM Sherman Porter, PT [facility]   1/31/2019  2:30 PM Andrea Ansari, PT [facility]   2/1/2019  2:30 PM Salazar Montana, PT [facility]   2/4/2019  2:30 PM Sherman Porter, PT [facility]   2/6/2019  3:00 PM Sherman Porter, PT [facility]   2/8/2019  2:30 PM Sherman Porter, PT [facility]   2/11/2019  2:30 PM Andrea Ansari, PT [facility]   2/13/2019  3:00 PM Sherman Porter, PT [facility]   2/15/2019  3:00 PM Andrea Ansari, PT [facility]   2/18/2019  2:30 PM Andrea Ansari, PT [facility]   2/20/2019  3:00 PM Sherman Porter, PT [facility]   2/22/2019  2:30 PM Sherman Porter, PT [facility]   2/25/2019  2:30 PM Andrea Ansari, PT [facility]   2/27/2019  3:00 PM Sherman Porter, PT [facility]   3/1/2019  2:30 PM Sherman Porter, PT [facility]

## 2019-01-31 ENCOUNTER — HOSPITAL ENCOUNTER (OUTPATIENT)
Dept: PHYSICAL THERAPY | Age: 69
Discharge: HOME | End: 2019-01-31
Payer: MEDICARE

## 2019-01-31 PROCEDURE — 97110 THERAPEUTIC EXERCISES: CPT

## 2019-01-31 PROCEDURE — 97140 MANUAL THERAPY 1/> REGIONS: CPT

## 2019-01-31 NOTE — PROGRESS NOTES
PT DAILY TREATMENT NOTE 10-18    Patient Name: Iona Doan  Date:2019  : 1950  [x]  Patient  Verified  Payor: Anthony Font / Plan: [place] MEDICARE PART A & B / Product Type: Medicare /    In time:232  Out time:321  Total Treatment Time (min): 14  Visit #: 3 of 24    Medicare/BCBS Only   Total Timed Codes (min):  31 1:1 Treatment Time:  31       Treatment Area: Pain in right wrist [M25.531]    SUBJECTIVE  Pain Level (0-10 scale): 2  Any medication changes, allergies to medications, adverse drug reactions, diagnosis change, or new procedure performed?: [x] No    [] Yes (see summary sheet for update)  Subjective functional status/changes:   [] No changes reported  I am doing the exercises at home. It is a little sore. OBJECTIVE    Modality rationale: decrease edema and decrease pain to improve the patients ability to aid with increase tolerance to ADLs and activities and post exercise recovery.    Min Type Additional Details    [] Estim:  []Unatt       []IFC  []Premod                        []Other:  []w/ice   []w/heat  Position:  Location:    [] Estim: []Att    []TENS instruct  []NMES                    []Other:  []w/US   []w/ice   []w/heat  Position:  Location:    []  Traction: [] Cervical       []Lumbar                       [] Prone          []Supine                       []Intermittent   []Continuous Lbs:  [] before manual  [] after manual    []  Ultrasound: []Continuous   [] Pulsed                           []1MHz   []3MHz W/cm2:  Location:    []  Iontophoresis with dexamethasone         Location: [] Take home patch   [] In clinic   10 [x]  Ice post    []  heat  []  Ice massage  []  Laser   []  Anodyne Position:seated  Location:right wrist/hand      []  Laser with stim  []  Other:  Position:  Location:    []  Vasopneumatic Device Pressure:       [] lo [] med [] hi   Temperature: [] lo [] med [] hi   [] Skin assessment post-treatment:  []intact []redness- no adverse reaction    []redness - adverse reaction:      min []Eval                  []Re-Eval       16 min Therapeutic Exercise:  [] See flow sheet :   Rationale: increase ROM, increase strength and improve coordination to improve the patients ability to aid with increase tolerance to ADLs and activities     min Therapeutic Activity:  []  See flow sheet :   Rationale:   to improve the patients ability to       min Neuromuscular Re-education:  []  See flow sheet :   Rationale:   to improve the patients ability to     15 min Manual Therapy: PROM with overpressure, flexion/ extension/ pronation/ supination/ radial deviation/ ulnar deviation. Gentle joint distraction at the right wrist.   Rationale: decrease pain, increase ROM, increase tissue extensibility and decrease edema  to aid with increase tolerance to ADLS and activities     min Gait Training:  ___ feet with ___ device on level surfaces with ___ level of assist   Rationale: With   [] TE   [] TA   [] neuro   [] other: Patient Education: [x] Review HEP    [] Progressed/Changed HEP based on:   [] positioning   [] body mechanics   [] transfers   [] heat/ice application    [x] other: added prayer stretch for wrist Extension     Other Objective/Functional Measures: VC exercises and technique     Pain Level (0-10 scale) post treatment: 2    ASSESSMENT/Changes in Function: tolerated well. Patient will continue to benefit from skilled PT services to modify and progress therapeutic interventions, address ROM deficits, address strength deficits, analyze and address soft tissue restrictions, analyze and cue movement patterns, analyze and modify body mechanics/ergonomics, assess and modify postural abnormalities and instruct in home and community integration to attain remaining goals. [x]  See Plan of Care  []  See progress note/recertification  []  See Discharge Summary         Progress towards goals / Updated goals:  Short Term Goals: To be accomplished in 2 treatments:  1.  Pt will report compliance and independence to [facility] to help the pt manage their pain and symptoms. Jenni Cornell  Current:MET  1/31/19  Long Term Goals: To be accomplished in 24 treatments:  1. Pt will increase FOTO score to 63 points to improve ability to perform ADLs. Eval: 63 points  Current: n/a 1/31/19  2. Pt will increase PROM right wrist flex to 45 degs, EXT to at least 40 degs to improve ability to tolerate dressing activities. Eval: flex 32 degs, EXT 23 degs; both with increased right anterior wrist pain  Current: n/a 1/31/19  3. Pt will increase AROM right wrist flex to at least 30 degs, EXT to at least 20 degs, supination to [facility] to improve ability to tolerate functional tasks. Eval: flex 8 degs, EXT neutral, supination 57 degs; all with increased right anterior wrist pain  Current: n/a 1/31/19  4. Pt will report being able to drive and  the steering wheel with the right hand without the brace on her right wrist to improve ease of driving.   Eval: reports using her brace with driving and has trouble gripping  Current: not using brace, able to  wheel ok but ROM limits turning wheel both right and left.  1/31/19         PLAN  [x]  Upgrade activities as tolerated     [x]  Continue plan of care  []  Update interventions per flow sheet       []  Discharge due to:_  []  Other:_      Maximilian Nieves, PT 1/31/2019  2:49 PM    Future Appointments   Date Time Provider Manasa Meza   2/1/2019  2:30 PM Susana Huerta, PT [facility]   2/4/2019  2:30 PM Jillian Lofton, PT [facility]   2/6/2019  3:00 PM Jillian Lofton PT [facility]   2/8/2019  2:30 PM Jillian Lofton PT [facility]   2/11/2019  2:30 PM González Pennington, PT [facility]   2/13/2019  3:00 PM Jillian Crome, PT [facility]   2/15/2019  3:00 PM González Pennington, PT [facility]   2/18/2019  2:30 PM González Pennington, PT [facility]   2/20/2019  3:00 PM Jillian Lofton, PT [facility]   2/22/2019  2:30 PM Jillian Lofton, PT [facility] 2/25/2019  2:30 PM Ny Cheng, PT [facility]   2/27/2019  3:00 PM Arun Vu, PT [facility]   3/1/2019  2:30 PM Arun Vu, PT [facility]

## 2019-02-01 ENCOUNTER — HOSPITAL ENCOUNTER (OUTPATIENT)
Dept: PHYSICAL THERAPY | Age: 69
Discharge: HOME | End: 2019-02-01
Payer: MEDICARE

## 2019-02-01 PROCEDURE — 97140 MANUAL THERAPY 1/> REGIONS: CPT

## 2019-02-01 PROCEDURE — 97110 THERAPEUTIC EXERCISES: CPT

## 2019-02-01 NOTE — PROGRESS NOTES
PT DAILY TREATMENT NOTE 10-18    Patient Name: Gena Chaparro  Date:2019  : 1950  [x]  Patient  Verified  Payor: [place] Jo / Plan: [place] MEDICARE PART A & B / Product Type: Medicare /    In time: 2:33  Out time: 3:14  Total Treatment Time (min): 41  Visit #: 4 of 24    Medicare/BCBS Only   Total Timed Codes (min):  41 1:1 Treatment Time: 41       Treatment Area: Pain in right wrist [M25.531]    SUBJECTIVE  Pain Level (0-10 scale): 2  Any medication changes, allergies to medications, adverse drug reactions, diagnosis change, or new procedure performed?: [x] No    [] Yes (see summary sheet for update)  Subjective functional status/changes:   [] No changes reported  No changes since yesterday's therapy session. OBJECTIVE    26 min Therapeutic Exercise:  [x] See flow sheet :   Rationale: increase ROM, increase strength and improve coordination to improve the patients ability to aid with increase tolerance to ADLs and activities    15 min Manual Therapy: scar massage, DTM right wrist flexors/extensors, DTM right thumb and palmar region, Gentle joint distraction at the right wrist, PROM with overpressure into flex/EXT/pronation/supination/radial and ulnar deviation, after performing above   Rationale: decrease pain, increase ROM, increase tissue extensibility and decrease edema  to aid with increase tolerance to ADLS and activities      With   [] TE   [] TA   [] neuro   [] other: Patient Education: [x] Review HEP    [] Progressed/Changed HEP based on:   [] positioning   [] body mechanics   [] transfers   [] heat/ice application    [] other:      Other Objective/Functional Measures: Mild decrease in scar mobility noted at the distal scar on the right wrist. Added 1# weights to bicep curls to improve strength.      Pain Level (0-10 scale) post treatment: 2    ASSESSMENT/Changes in Function: Pt denied cold pack post session and educated pt that she can use it at home for 15-20 mins as needed for swelling/pain. Challenged with gripper exercises and with gripping the larger objects during the velcro board exercises but was able to perform. Improvement in opposition ROM noted on the right thumb and 5th digit. Continue POC as tolerated. Patient will continue to benefit from skilled PT services to modify and progress therapeutic interventions, address functional mobility deficits, address ROM deficits, address strength deficits, analyze and address soft tissue restrictions, analyze and cue movement patterns, analyze and modify body mechanics/ergonomics, assess and modify postural abnormalities and instruct in home and community integration to attain remaining goals. []  See Plan of Care  []  See progress note/recertification  []  See Discharge Summary         Progress towards goals / Updated goals:  Short Term Goals: To be accomplished in 2 treatments:  1. Pt will report compliance and independence to [facility] to help the pt manage their pain and symptoms. Miko Moreno  Current:MET  1/31/19  Long Term Goals: To be accomplished in 24 treatments:  1. Pt will increase FOTO score to 63 points to improve ability to perform ADLs. Eval: 63 points  Current: n/a 1/31/19  2. Pt will increase PROM right wrist flex to 45 degs, EXT to at least 40 degs to improve ability to tolerate dressing activities. Eval: flex 32 degs, EXT 23 degs; both with increased right anterior wrist pain  Current: n/a 1/31/19  3. Pt will increase AROM right wrist flex to at least 30 degs, EXT to at least 20 degs, supination to [facility] to improve ability to tolerate functional tasks. Eval: flex 8 degs, EXT neutral, supination 57 degs; all with increased right anterior wrist pain  Current: n/a 1/31/19  4.  Pt will report being able to drive and  the steering wheel with the right hand without the brace on her right wrist to improve ease of driving.   Eval: reports using her brace with driving and has trouble gripping  Current: not using brace, able to  wheel ok but ROM limits turning wheel both right and left.  1/31/19     PLAN  [x]  Upgrade activities as tolerated     [x]  Continue plan of care  []  Update interventions per flow sheet       []  Discharge due to:_  []  Other:_      Mil Araya, PT 2/1/2019  2:40 PM    Future Appointments   Date Time Provider Manasa Meza   2/4/2019  2:30 PM Nas Mcgovern, PT [facility]   2/6/2019  3:00 PM Nas Mcgovern, PT [facility]   2/8/2019  2:30 PM Nas Mcgovern, PT [facility]   2/11/2019  2:30 PM Genboom Dong, PT [facility]   2/13/2019  3:00 PM Nas Mcgovern, PT [facility]   2/15/2019  3:00 PM Genora Bee, PT [facility]   2/18/2019  2:30 PM Genora Bee, PT [facility]   2/20/2019  3:00 PM Nas Hillmanp, PT [facility]   2/22/2019  2:30 PM Nas Mcgovern, PT [facility]   2/25/2019  2:30 PM Genora Bee, PT [facility]   2/27/2019  3:00 PM Nas Hillmanp, PT [facility]   3/1/2019  2:30 PM Ebonydakellee Hillmanp, PT [facility]

## 2019-02-04 ENCOUNTER — HOSPITAL ENCOUNTER (OUTPATIENT)
Dept: PHYSICAL THERAPY | Age: 69
Discharge: HOME | End: 2019-02-04
Payer: MEDICARE

## 2019-02-04 PROCEDURE — 97110 THERAPEUTIC EXERCISES: CPT

## 2019-02-04 PROCEDURE — 97140 MANUAL THERAPY 1/> REGIONS: CPT

## 2019-02-04 NOTE — PROGRESS NOTES
PT DAILY TREATMENT NOTE 10-18    Patient Name: Gena Chaparro  Date:2019  : 1950  [x]  Patient  Verified  Payor: [place] MEDICARE / Plan: VA MEDICARE PART A & B / Product Type: Medicare /    In time:2:35  Out time:3:08  Total Treatment Time (min): 33  Visit #: 5 of 24    Medicare/BCBS Only   Total Timed Codes (min):  33 1:1 Treatment Time:  33       Treatment Area: Pain in right wrist [M25.531]    SUBJECTIVE  Pain Level (0-10 scale): 2-3  Any medication changes, allergies to medications, adverse drug reactions, diagnosis change, or new procedure performed?: [x] No    [] Yes (see summary sheet for update)  Subjective functional status/changes:   [] No changes reported  States she has been a little more sore over the weekend, is unsure why the soreness has increased    OBJECTIVE    23 min Therapeutic Exercise:  [x] See flow sheet :   Rationale: increase ROM and increase strength to improve the patients ability to perform daily household chores. 10 min Manual Therapy:  PROM with overpressure, flexion/ extension/ RD/ UD/ pronation/ Supination, gentle joint distraction    Rationale: decrease pain and increase ROM to assist with increase tolerance to ADLs              With   [] TE   [] TA   [] neuro   [] other: Patient Education: [x] Review HEP    [] Progressed/Changed HEP based on:   [] positioning   [] body mechanics   [] transfers   [] heat/ice application    [] other:      Other Objective/Functional Measures:   Increased resistance to finger web with good tolerance   Performed all therex with reports of soreness during supination activities      Pain Level (0-10 scale) post treatment: 2    ASSESSMENT/Changes in Function: Patient continues to show improvements with signs/ symptoms however still demonstrates a decrease in strength, impaired ROM,  and an increase in pain with functional activities.        Patient will continue to benefit from skilled PT services to modify and progress therapeutic interventions, address functional mobility deficits, address ROM deficits, address strength deficits, analyze and cue movement patterns and analyze and modify body mechanics/ergonomics to attain remaining goals. [x]  See Plan of Care  []  See progress note/recertification  []  See Discharge Summary         Progress towards goals / Updated goals:  Short Term Goals: To be accomplished in 2 treatments:  1. Pt will report compliance and independence to [facility] to help the pt manage their pain and symptoms. Gui Hardy  Current:MET  1/31/19  Long Term Goals: To be accomplished in 24 treatments:  1. Pt will increase FOTO score to 63 points to improve ability to perform ADLs. Eval: 63 points  Current: n/a 1/31/19  2. Pt will increase PROM right wrist flex to 45 degs, EXT to at least 40 degs to improve ability to tolerate dressing activities. Eval: flex 32 degs, EXT 23 degs; both with increased right anterior wrist pain  Current: n/a 1/31/19  3. Pt will increase AROM right wrist flex to at least 30 degs, EXT to at least 20 degs, supination to [facility] to improve ability to tolerate functional tasks. Eval: flex 8 degs, EXT neutral, supination 57 degs; all with increased right anterior wrist pain  Current: n/a 1/31/19  4. Pt will report being able to drive and  the steering wheel with the right hand without the brace on her right wrist to improve ease of driving.   Eval: reports using her brace with driving and has trouble gripping  Current: not using brace, able to  wheel ok but ROM limits turning wheel both right and left.  1/31/19         PLAN  [x]  Upgrade activities as tolerated     [x]  Continue plan of care  []  Update interventions per flow sheet       []  Discharge due to:_  []  Other:_      Bobbi Roberson, PT 2/4/2019  7:46 AM    Future Appointments   Date Time Provider Manasa Meza   2/4/2019  2:30 PM [facility]   2/6/2019  3:00 PM [facility] 2/8/2019  2:30 PM Latha Jama, PT [facility]   2/11/2019  2:30 PM Anastacio Dawn, PT [facility]   2/13/2019  3:00 PM Latha Jama, PT [facility]   2/15/2019  3:00 PM Anastacio Dawn, PT [facility]   2/18/2019  2:30 PM Anastacio Crenisha, PT [facility]   2/20/2019  3:00 PM Latha Jama, PT [facility]   2/22/2019  2:30 PM Latha Jama, PT [facility]   2/25/2019  2:30 PM Anastacio Dawn, PT [facility]   2/27/2019  3:00 PM Latha Jama, PT [facility]   3/1/2019  2:30 PM Latha Jama, PT [facility]

## 2019-02-06 ENCOUNTER — HOSPITAL ENCOUNTER (OUTPATIENT)
Dept: PHYSICAL THERAPY | Age: 69
Discharge: HOME | End: 2019-02-06
Payer: MEDICARE

## 2019-02-06 PROCEDURE — 97140 MANUAL THERAPY 1/> REGIONS: CPT

## 2019-02-06 PROCEDURE — 97110 THERAPEUTIC EXERCISES: CPT

## 2019-02-06 NOTE — PROGRESS NOTES
PT DAILY TREATMENT NOTE 10-18 Patient Name: Sandy Gomes Date:2019 : 1950 [x]  Patient  Verified Payor: VA MEDICARE / Plan: Harper Baez [facility] / Product Type: Medicare / In time: 3:01  Out time: Total Treatment Time (min):  
Visit #: 6 of 24 Medicare/BCBS Only Total Timed Codes (min):   1:1 Treatment Time:    
 
 
Treatment Area: Pain in right wrist [M25.531] SUBJECTIVE Pain Level (0-10 scale): Any medication changes, allergies to medications, adverse drug reactions, diagnosis change, or new procedure performed?: [x] No    [] Yes (see summary sheet for update) Subjective functional status/changes:   [] No changes reported OBJECTIVE Modality rationale: decrease edema, decrease inflammation, decrease pain and increase tissue extensibility to improve the patients ability to perform ADL Min Type Additional Details  
 [] Estim:  []Unatt       []IFC  []Premod []Other:  []w/ice   []w/heat Position: Location:  
 [] Estim: []Att    []TENS instruct  []NMES []Other:  []w/US   []w/ice   []w/heat Position: Location:  
 []  Traction: [] Cervical       []Lumbar 
                     [] Prone          []Supine []Intermittent   []Continuous Lbs: 
[] before manual 
[] after manual  
 []  Ultrasound: []Continuous   [] Pulsed []1MHz   []3MHz W/cm2: 
Location:  
 []  Iontophoresis with dexamethasone Location: [] Take home patch  
[] In clinic  
 []  Ice     []  heat 
[]  Ice massage 
[]  Laser  
[]  Anodyne Position: Location:  
 []  Laser with stim 
[]  Other:  Position: Location:  
 []  Vasopneumatic Device Pressure:       [] lo [] med [] hi  
Temperature: [] lo [] med [] hi  
[x] Skin assessment post-treatment:  [x]intact []redness- no adverse reaction 
  []redness  adverse reaction:  
 
 min []Eval                  []Re-Eval  
 
 
 24 min Therapeutic Exercise:  [] See flow sheet :  
Rationale: increase ROM and increase strength to improve the patients ability to perform ADL  
 
 min Therapeutic Activity:  []  See flow sheet :  
Rationale:   to improve the patients ability to  
  
 min Neuromuscular Re-education:  []  See flow sheet :  
Rationale:   to improve the patients ability to  
 
 min Manual Therapy:    
Rationale: decrease pain, increase ROM, increase tissue extensibility and decrease edema  to perform ADL  
 
 min Gait Training:  ___ feet with ___ device on level surfaces with ___ level of assist  
Rationale: With 
 [x] TE 
 [] TA 
 [] neuro 
 [] other: Patient Education: [x] Review HEP [] Progressed/Changed HEP based on:  
[] positioning   [] body mechanics   [] transfers   [] heat/ice application   
[] other:   
 
Other Objective/Functional Measures:  
 
Pain Level (0-10 scale) post treatment:  
 
ASSESSMENT/Changes in Function:  
 
Patient will continue to benefit from skilled PT services to address functional mobility deficits, address ROM deficits, address strength deficits, analyze and address soft tissue restrictions, analyze and cue movement patterns and instruct in home and community integration to attain remaining goals. [x]  See Plan of Care 
[]  See progress note/recertification 
[]  See Discharge Summary Progress towards goals / Updated goals: 
Short Term Goals: To be accomplished in 2 treatments: 1. Pt will report compliance and independence to HEP to help the pt manage their pain and symptoms. Eval: Established         
Current:MET  1/31/19 Long Term Goals: To be accomplished in 24 treatments: 1. Pt will increase FOTO score to 63 points to improve ability to perform ADLs. Eval: 63 points Current: n/a 1/31/19 2. Pt will increase PROM right wrist flex to 45 degs, EXT to at least 40 degs to improve ability to tolerate dressing activities. Eval: flex 32 degs, EXT 23 degs; both with increased right anterior wrist pain Current: n/a 1/31/19 3. Pt will increase AROM right wrist flex to at least 30 degs, EXT to at least 20 degs, supination to [facility] to improve ability to tolerate functional tasks. Eval: flex 8 degs, EXT neutral, supination 57 degs; all with increased right anterior wrist pain Current: n/a 1/31/19 4. Pt will report being able to drive and  the steering wheel with the right hand without the brace on her right wrist to improve ease of driving.  
Eval: reports using her brace with driving and has trouble gripping Current: not using brace, able to  wheel ok but ROM limits turning wheel both right and left. 1/31/19  
  
  
  
 
 
 
PLAN 
[]  Upgrade activities as tolerated     [x]  Continue plan of care 
[]  Update interventions per flow sheet      
[]  Discharge due to:_ 
[]  Other:_   
 
Meenu Metcalf, PTA 2/6/2019  3:14 PM 
 
Future Appointments Date Time Provider Manasa Meza 2/8/2019  2:30 PM Rashard Garcia, PT [facility]  
2/11/2019  2:30 PM Bayron West, PT [facility]  
2/13/2019  3:00 PM Rashard Garcia, PT [facility]  
2/15/2019  3:00 PM Bayron West, PT [facility]  
2/18/2019  2:30 PM Bayron West, PT [facility]  
2/20/2019  3:00 PM Rashard Garcia PT [facility]  
2/22/2019  2:30 PM Rashard Garcia PT [facility]  
2/25/2019  2:30 PM Bayron West, PT [facility]  
2/27/2019  3:00 PM Rashard Garcia PT [facility]  
3/1/2019  2:30 PM [facility]

## 2019-02-06 NOTE — PROGRESS NOTES
PT DAILY TREATMENT NOTE 10-18    Patient Name: Alaina Castaneda  Date:2019  : 1950  [x]  Patient  Verified  Payor: VA MEDICARE / Plan: VA MEDICARE PART A & B / Product Type: Medicare /    In time: 3:01 Out time:3:54  Total Treatment Time (min): 48  Visit #: 6 of 24    Medicare/BCBS Only   Total Timed Codes (min):  38 1:1 Treatment Time:  23       Treatment Area: Pain in right wrist [M25.531]    SUBJECTIVE  Pain Level (0-10 scale): 2-3  Any medication changes, allergies to medications, adverse drug reactions, diagnosis change, or new procedure performed?: [x] No    [] Yes (see summary sheet for update)  Subjective functional status/changes:   [] No changes reported  Its  Getting  Better.     OBJECTIVE    Modality rationale: decrease edema, decrease inflammation, decrease pain and increase tissue extensibility to improve the patients ability to perform ADL    Type Additional Details   [] Estim:  []Unatt       []IFC  []Premod                        []Other:  []w/ice   []w/heat  Position:  Location:   [] Estim: []Att    []TENS instruct  []NMES                    []Other:  []w/US   []w/ice   []w/heat  Position:  Location:   []  Traction: [] Cervical       []Lumbar                       [] Prone          []Supine                       []Intermittent   []Continuous Lbs:  [] before manual  [] after manual   []  Ultrasound: []Continuous   [] Pulsed                           []1MHz   []3MHz W/cm2:  Location:   []  Iontophoresis with dexamethasone         Location: [] Take home patch   [] In clinic   [x]  Ice   post  []  heat  []  Ice massage  []  Laser   []  Anodyne Position:seated  Location:right  hand   []  Laser with stim  []  Other:  Position:  Location:   []  Vasopneumatic Device Pressure:       [] lo [] med [] hi   Temperature: [] lo [] med [] hi   [x] Skin assessment post-treatment:  [x]intact []redness- no adverse reaction    []redness - adverse reaction:      min []Eval                  []Re-Eval 23 min Therapeutic Exercise:  [] See flow sheet :   Rationale: increase ROM and increase strength to improve the patients ability to perform ADL      min Therapeutic Activity:  []  See flow sheet :   Rationale:   to improve the patients ability to       min Neuromuscular Re-education:  []  See flow sheet :   Rationale:   to improve the patients ability to     15 min Manual Therapy: edema  Massage/wrist  Distraction/passive  Stretch  All  plane   Rationale: decrease pain, increase ROM, increase tissue extensibility and decrease edema  to perform ADL      min Gait Training:  ___ feet with ___ device on level surfaces with ___ level of assist   Rationale: With   [x] TE   [] TA   [] neuro   [] other: Patient Education: [x] Review HEP    [] Progressed/Changed HEP based on:   [] positioning   [] body mechanics   [] transfers   [] heat/ice application    [] other:      Other Objective/Functional Measures: Minimal  Pain with wrist ext     Pain Level (0-10 scale) post treatment:2     ASSESSMENT/Changes in Function: Overall  Fair response  To each there ex. Patient will continue to benefit from skilled PT services to address functional mobility deficits, address ROM deficits, address strength deficits, analyze and address soft tissue restrictions and instruct in home and community integration to attain remaining goals. [x]  See Plan of Care  []  See progress note/recertification  []  See Discharge Summary         Progress towards goals / Updated goals:  Short Term Goals: To be accomplished in 2 treatments:  1. Pt will report compliance and independence to HEP to help the pt manage their pain and symptoms. Eval: Established          Current:MET  1/31/19  Long Term Goals: To be accomplished in 24 treatments:  1. Pt will increase FOTO score to 63 points to improve ability to perform ADLs. Eval: 63 points  Current: n/a 1/31/19  2.  Pt will increase PROM right wrist flex to 45 degs, EXT to at least 40 degs to improve ability to tolerate dressing activities. Eval: flex 32 degs, EXT 23 degs; both with increased right anterior wrist pain  Current: n/a 1/31/19  3. Pt will increase AROM right wrist flex to at least 30 degs, EXT to at least 20 degs, supination to [facility] to improve ability to tolerate functional tasks. Eval: flex 8 degs, EXT neutral, supination 57 degs; all with increased right anterior wrist pain  Current: n/a 1/31/19  4. Pt will report being able to drive and  the steering wheel with the right hand without the brace on her right wrist to improve ease of driving.   Eval: reports using her brace with driving and has trouble gripping  Current: not using brace, able to  wheel ok but ROM limits turning wheel both right and left.  1/31/19            PLAN  []  Upgrade activities as tolerated     []  Continue plan of care  []  Update interventions per flow sheet       []  Discharge due to:_  [x]  Other:_ REASSESS  ROM NV     Meenu Metcalf, SUKUMAR 2/6/2019  3:26 PM    Future Appointments   Date Time Provider Manasa Meza   2/8/2019  2:30 PM Jv Medley PT [facility]   2/11/2019  2:30 PM Cheryl Villanueva, PT [facility]   2/13/2019  3:00 PM Jv Medley PT [facility]   2/15/2019  3:00 PM Cheryl Villanueva PT [facility]   2/18/2019  2:30 PM Cheryl Villanueva, PT [facility]   2/20/2019  3:00 PM Jv Medley PT [facility]   2/22/2019  2:30 PM Jv Medley PT [facility]   2/25/2019  2:30 PM Cheryl Villanueva PT [facility]   2/27/2019  3:00 PM Jv Medley PT [facility]   3/1/2019  2:30 PM Jv Medley PT [facility]

## 2019-02-08 ENCOUNTER — HOSPITAL ENCOUNTER (OUTPATIENT)
Dept: PHYSICAL THERAPY | Age: 69
Discharge: HOME | End: 2019-02-08
Payer: MEDICARE

## 2019-02-08 PROCEDURE — 97140 MANUAL THERAPY 1/> REGIONS: CPT

## 2019-02-08 PROCEDURE — 97110 THERAPEUTIC EXERCISES: CPT

## 2019-02-08 NOTE — PROGRESS NOTES
PT DAILY TREATMENT NOTE 10-18    Patient Name: Ceci Corral  Date:2019  : 1950  [x]  Patient  Verified  Payor: Pramod Varghese / Plan: VA MEDICARE PART A & B / Product Type: Medicare /    In time:2:31  Out time:3:07  Total Treatment Time (min): 36  Visit #: 7 of 24    Medicare/BCBS Only   Total Timed Codes (min):  36 1:1 Treatment Time:  25       Treatment Area: Pain in right wrist [M25.531]    SUBJECTIVE  Pain Level (0-10 scale): 2  Any medication changes, allergies to medications, adverse drug reactions, diagnosis change, or new procedure performed?: [x] No    [] Yes (see summary sheet for update)  Subjective functional status/changes:   [] No changes reported  States she is getting her motion back a little bit, the sharp pain is going away at the end of the scar    OBJECTIVE        24 min Therapeutic Exercise:  [x] See flow sheet :   Rationale: increase ROM, increase strength and increase proprioception to improve the patients ability to perform daily household chores. 12 min Manual Therapy:  Effleurage to right hand/ wrist/ digits to reduce swelling, PROM with overpressure flexion/ extension/ Radial deviation/ ulnar deviation   Rationale: decrease pain, increase ROM and increase tissue extensibility to assist with personal hygiene tasks such as brushing hair/ bathing        With   [] TE   [] TA   [] neuro   [] other: Patient Education: [x] Review HEP    [] Progressed/Changed HEP based on:   [] positioning   [] body mechanics   [] transfers   [] heat/ice application    [] other:      Other Objective/Functional Measures: Tolerated all therex well, no changes in symptoms  Increased AROM noted with prayer stretch      Pain Level (0-10 scale) post treatment: 2    ASSESSMENT/Changes in Function: Patient continues to show improvements with signs/ symptoms however still demonstrates a decrease in strength, impaired ROM, and an increase in pain with functional activities.        Patient will continue to benefit from skilled PT services to modify and progress therapeutic interventions, address functional mobility deficits, address strength deficits, analyze and cue movement patterns and analyze and modify body mechanics/ergonomics to attain remaining goals. [x]  See Plan of Care  []  See progress note/recertification  []  See Discharge Summary         Progress towards goals / Updated goals:  Short Term Goals: To be accomplished in 2 treatments:  1. Pt will report compliance and independence to [facility] to help the pt manage their pain and symptoms. Eval: Established          Current:MET  1/31/19  Long Term Goals: To be accomplished in 24 treatments:  1. Pt will increase FOTO score to 63 points to improve ability to perform ADLs. Eval: 63 points  Current: n/a 1/31/19  2. Pt will increase PROM right wrist flex to 45 degs, EXT to at least 40 degs to improve ability to tolerate dressing activities. Eval: flex 32 degs, EXT 23 degs; both with increased right anterior wrist pain  Current: n/a 1/31/19  3. Pt will increase AROM right wrist flex to at least 30 degs, EXT to at least 20 degs, supination to [facility] to improve ability to tolerate functional tasks. Eval: flex 8 degs, EXT neutral, supination 57 degs; all with increased right anterior wrist pain  Current: n/a 1/31/19  4. Pt will report being able to drive and  the steering wheel with the right hand without the brace on her right wrist to improve ease of driving.   Eval: reports using her brace with driving and has trouble gripping  Current: not using brace, able to  wheel ok but ROM limits turning wheel both right and left.  1/31/19         PLAN  [x]  Upgrade activities as tolerated     [x]  Continue plan of care  []  Update interventions per flow sheet       []  Discharge due to:_  []  Other:_      Akanksha Ricardo, PT 2/8/2019  8:18 AM    Future Appointments   Date Time Provider Manasa Meza   2/8/2019  2:30 PM Marleny Carrillo, PT [facility] 2/11/2019  2:30 PM Meenu Metcalf, PTA [facility]   2/13/2019  3:00 PM Uziel Dooley, PT [facility]   2/15/2019  3:00 PM Tien Nguyen, PT [facility]   2/18/2019  2:30 PM Tien Nguyen, PT [facility]   2/20/2019  3:00 PM Uziel Dooley, PT [facility]   2/22/2019  2:30 PM Uziel Dooley, PT [facility]   2/25/2019  2:30 PM Tien Nguyen, PT [facility]   2/27/2019  3:00 PM Uziel Dooley, PT [facility]   3/1/2019  2:30 PM Uziel Dooley, PT [facility]

## 2019-02-11 ENCOUNTER — APPOINTMENT (OUTPATIENT)
Dept: PHYSICAL THERAPY | Age: 69
End: 2019-02-11
Payer: MEDICARE

## 2019-02-13 ENCOUNTER — HOSPITAL ENCOUNTER (OUTPATIENT)
Dept: PHYSICAL THERAPY | Age: 69
Discharge: HOME | End: 2019-02-13
Payer: MEDICARE

## 2019-02-13 PROCEDURE — 97110 THERAPEUTIC EXERCISES: CPT

## 2019-02-13 PROCEDURE — 97140 MANUAL THERAPY 1/> REGIONS: CPT

## 2019-02-13 NOTE — PROGRESS NOTES
PT DAILY TREATMENT NOTE 10-18    Patient Name: Brandy Aguilar  Date:2019  : 1950  [x]  Patient  Verified  Payor: [place] MEDICARE / Plan: [place] MEDICARE PART A & B / Product Type: Medicare /    In time:3:01  Out time:3:41  Total Treatment Time (min): 40  Visit #: 8 of 24    Medicare/BCBS Only   Total Timed Codes (min):  40 1:1 Treatment Time:  40       Treatment Area: Pain in right wrist [M25.531]    SUBJECTIVE  Pain Level (0-10 scale): 1  Any medication changes, allergies to medications, adverse drug reactions, diagnosis change, or new procedure performed?: [x] No    [] Yes (see summary sheet for update)  Subjective functional status/changes:   [] No changes reported  States she felt a significant reduction in pain and was able to perform more tasks at home after last therapy session , felt 75% better    OBJECTIVE        28 min Therapeutic Exercise:  [x] See flow sheet :   Rationale: increase ROM, increase strength and increase proprioception to improve the patients ability to perform daily household chores. 12 min Manual Therapy:  PROM flexion/ extension/ RD/ UD / supination/ pronation with overpressure, DTM/STM all digits and hand/ wrist to decrease swelling    Rationale: decrease pain, increase ROM and increase tissue extensibility to assist with tolerance to ADLs      With   [] TE   [] TA   [] neuro   [] other: Patient Education: [x] Review HEP    [] Progressed/Changed HEP based on:   [] positioning   [] body mechanics   [] transfers   [] heat/ice application    [] other:      Other Objective/Functional Measures: Tolerated addition of weighted flexion/ extension this session well, increased weight for biceps curls      Pain Level (0-10 scale) post treatment: 1    ASSESSMENT/Changes in Function: Patient continues to show improvements with signs/ symptoms however still demonstrates a decrease in strength, impaired ROM,  and an increase in pain with functional activities.        Patient will continue to benefit from skilled PT services to modify and progress therapeutic interventions, address functional mobility deficits, address strength deficits, analyze and cue movement patterns and analyze and modify body mechanics/ergonomics to attain remaining goals. [x]  See Plan of Care  []  See progress note/recertification  []  See Discharge Summary         Progress towards goals / Updated goals:  Short Term Goals: To be accomplished in 2 treatments:  1. Pt will report compliance and independence to [facility] to help the pt manage their pain and symptoms. Eval: Established          Current:MET  1/31/19  Long Term Goals: To be accomplished in 24 treatments:  1. Pt will increase FOTO score to 63 points to improve ability to perform ADLs. Eval: 63 points  Current: n/a 1/31/19  2. Pt will increase PROM right wrist flex to 45 degs, EXT to at least 40 degs to improve ability to tolerate dressing activities. Eval: flex 32 degs, EXT 23 degs; both with increased right anterior wrist pain  Current: n/a 1/31/19  3. Pt will increase AROM right wrist flex to at least 30 degs, EXT to at least 20 degs, supination to [facility] to improve ability to tolerate functional tasks. Eval: flex 8 degs, EXT neutral, supination 57 degs; all with increased right anterior wrist pain  Current: n/a 1/31/19  4. Pt will report being able to drive and  the steering wheel with the right hand without the brace on her right wrist to improve ease of driving.   Eval: reports using her brace with driving and has trouble gripping  Current: not using brace, able to  wheel ok but ROM limits turning wheel both right and left.  1/31/19         PLAN  [x]  Upgrade activities as tolerated     [x]  Continue plan of care  []  Update interventions per flow sheet       []  Discharge due to:_  []  Other:_      Stefany Lee, PT 2/13/2019  1:40 PM    Future Appointments   Date Time Provider Manasa Meza   2/13/2019  3:00 PM [facility]   2/15/2019  3:00 PM Marci Avina, PT [facility]   2/18/2019  2:30 PM Marci Avina, PT [facility]   2/20/2019  3:00 PM Miya Hoang, PT [facility]   2/22/2019  2:30 PM Miya Hoang, PT [facility]   2/25/2019  2:30 PM Claudene Hughes, PTA [facility]   2/27/2019  3:00 PM Miya Hoang, PT [facility]   3/1/2019  2:30 PM Miya Hoang, PT [facility]

## 2019-02-15 ENCOUNTER — HOSPITAL ENCOUNTER (OUTPATIENT)
Dept: PHYSICAL THERAPY | Age: 69
Discharge: HOME | End: 2019-02-15
Payer: MEDICARE

## 2019-02-15 PROCEDURE — 97110 THERAPEUTIC EXERCISES: CPT

## 2019-02-15 PROCEDURE — 97140 MANUAL THERAPY 1/> REGIONS: CPT

## 2019-02-15 NOTE — PROGRESS NOTES
PT DAILY TREATMENT NOTE 10-18    Patient Name: Dulce Hartley  Date:2/15/2019  : 1950  [x]  Patient  Verified  Payor: Davey Amesephraim / Plan: [place] MEDICARE PART A & B / Product Type: Medicare /    In time:304  Out time:359  Total Treatment Time (min): 55  Visit #: 9 of 24    Medicare/BCBS Only   Total Timed Codes (min):  45 1:1 Treatment Time:  45       Treatment Area: Pain in right wrist [M25.531]    SUBJECTIVE  Pain Level (0-10 scale): 0  Any medication changes, allergies to medications, adverse drug reactions, diagnosis change, or new procedure performed?: [x] No    [] Yes (see summary sheet for update)  Subjective functional status/changes:   [] No changes reported  It is getting stiff again.  It is moving more in the backward direction    OBJECTIVE    Modality rationale: post treatment  to improve the patients ability to aid with increase tolerance to ADLS and activities   Min Type Additional Details    [] Estim:  []Unatt       []IFC  []Premod                        []Other:  []w/ice   []w/heat  Position:  Location:    [] Estim: []Att    []TENS instruct  []NMES                    []Other:  []w/US   []w/ice   []w/heat  Position:  Location:    []  Traction: [] Cervical       []Lumbar                       [] Prone          []Supine                       []Intermittent   []Continuous Lbs:  [] before manual  [] after manual    []  Ultrasound: []Continuous   [] Pulsed                           []1MHz   []3MHz W/cm2:  Location:    []  Iontophoresis with dexamethasone         Location: [] Take home patch   [] In clinic   10 [x]  Ice post    []  heat  []  Ice massage  []  Laser   []  Anodyne Position:seated  Location:right hand and writst    []  Laser with stim  []  Other:  Position:  Location:    []  Vasopneumatic Device Pressure:       [] lo [] med [] hi   Temperature: [] lo [] med [] hi   [] Skin assessment post-treatment:  []intact []redness- no adverse reaction    []redness - adverse reaction:       min []Eval                  []Re-Eval       30 min Therapeutic Exercise:  [x] See flow sheet :   Rationale: increase ROM, increase strength and improve coordination to improve the patients ability to aid with increase tolerance to ADLs and activities     min Therapeutic Activity:  []  See flow sheet :   Rationale:   to improve the patients ability to       min Neuromuscular Re-education:  []  See flow sheet :   Rationale:   to improve the patients ability to     15 min Manual Therapy:  PROM flexion/ extension/ RD/ UD / supination/ pronation with overpressure, DTM/STM all digits and hand/ wrist to decrease swelling    Rationale: decrease pain, increase ROM, increase tissue extensibility and decrease edema  to aid with increase tolerance to ADLS and activities     min Gait Training:  ___ feet with ___ device on level surfaces with ___ level of assist   Rationale: With   [] TE   [] TA   [] neuro   [] other: Patient Education: [x] Review HEP    [] Progressed/Changed HEP based on:   [] positioning   [] body mechanics   [] transfers   [] heat/ice application    [] other:      Other Objective/Functional Measures: VC exercises and technique     Pain Level (0-10 scale) post treatment: 0    ASSESSMENT/Changes in Function: tolerated well. Patient will continue to benefit from skilled PT services to modify and progress therapeutic interventions, address ROM deficits, address strength deficits, analyze and address soft tissue restrictions, analyze and cue movement patterns, analyze and modify body mechanics/ergonomics, assess and modify postural abnormalities and instruct in home and community integration to attain remaining goals. [x]  See Plan of Care  []  See progress note/recertification  []  See Discharge Summary         Progress towards goals / Updated goals:   Short Term Goals: To be accomplished in 2 treatments:  1.  Pt will report compliance and independence to HEP to help the pt manage their pain and symptoms. Shanti Munoz  Current:MET  1/31/19 2/15/19  Long Term Goals: To be accomplished in 24 treatments:  1. Pt will increase FOTO score to 63 points to improve ability to perform ADLs. Eval: 63 points  Current: n/a 1/31/19   Recheck next session   2. Pt will increase PROM right wrist flex to 45 degs, EXT to at least 40 degs to improve ability to tolerate dressing activities. Eval: flex 32 degs, EXT 23 degs; both with increased right anterior wrist pain  Current: n/a 1/31/19  3. Pt will increase AROM right wrist flex to at least 30 degs, EXT to at least 20 degs, supination to [facility] to improve ability to tolerate functional tasks. Eval: flex 8 degs, EXT neutral, supination 57 degs; all with increased right anterior wrist pain  Current: n/a 1/31/19  4. Pt will report being able to drive and  the steering wheel with the right hand without the brace on her right wrist to improve ease of driving.   Eval: reports using her brace with driving and has trouble gripping  Current: not using brace, able to  wheel ok but ROM limits turning wheel both right and left.  1/31/19 2/15/19              PLAN  [x]  Upgrade activities as tolerated     [x]  Continue plan of care  []  Update interventions per flow sheet       []  Discharge due to:_  [x]  Other:_  Recheck FOTO an goals for 10th visit note    Winsome Ross, PT 2/15/2019  3:19 PM    Future Appointments   Date Time Provider Manasa Meza   2/18/2019  2:30 PM Hermelinda Mortimer, PT [facility]   2/20/2019  3:00 PM Lilly Abraham, PT [facility]   2/22/2019  2:30 PM Lilly Abraham, PT [facility]   2/25/2019  2:30 PM Milvia Gibson, [facility]AR [facility]   2/27/2019  3:00 PM Lilly Abraham PT [facility]   3/1/2019  2:30 PM Lilly Abraham PT [facility]

## 2019-02-18 ENCOUNTER — HOSPITAL ENCOUNTER (OUTPATIENT)
Dept: PHYSICAL THERAPY | Age: 69
Discharge: HOME | End: 2019-02-18
Payer: MEDICARE

## 2019-02-18 PROCEDURE — 97110 THERAPEUTIC EXERCISES: CPT

## 2019-02-18 PROCEDURE — 97530 THERAPEUTIC ACTIVITIES: CPT

## 2019-02-18 PROCEDURE — 97140 MANUAL THERAPY 1/> REGIONS: CPT

## 2019-02-18 NOTE — PROGRESS NOTES
[address], [address] [place] [address], [address] [place], [place] [address]y [phone] fax      Medicare Progress Report    Patient name: Praful Vaughn Start of Care: 19   Referral source: Seth AliciaKermitma : 1950   Medical/Treatment Diagnosis: Pain in right wrist [M25.531]  Payor: [place] MEDICARE / Plan: VA MEDICARE PART A & B / Product Type: Medicare /  Onset Date: DOS 2018, initial injury 2018                Prior Hospitalization: see medical history Provider#: [id number]   Medications: Verified on Patient Summary List    Comorbidities: reports sustaining a chip fracture in the left elbow from the same fall (no surgery was performed)   Prior Level of Function: Independent with ADls and functional tasks with no limitations prior to onset. Pt is right-handed. Visits from Start of Care: 10    Missed Visits: 0    Reporting Period: 19 to 19    Subjective Reports: I was a little sore after going to 3#    Current Status/ treatment goals Objective measures   1. Pt will report compliance and independence to [facility] to help the pt manage their pain and symptoms       [x] met                 [] not met  [] progressing     2. Pt will increase FOTO score to 63 points to improve ability to perform ADLs. [] met                 [] not met  [x] progressing 49   3. Pt will increase PROM right wrist flex to 45 degs, EXT to at least 40 degs to improve ability to tolerate dressing activities       [] met                 [] not met  [x] progressing right wrist flex to 50 degs, EXT       4.  Pt will increase AROM right wrist flex to at least 30 degs, EXT to at least 20 degs, supination to [facility] to improve ability to tolerate functional tasks       [] met                 [] not met  [x] progressing AROM wrist F 40 and E 35 supin          Key functional changes: increase FOTO and ROM      Problems/ barriers to goal attainment: residual pain , LOM, decrease strength     Assessment / Recommendations:Patient demonstrates the potential to make further gains with improving ROM, strength, endurance/activity tolerance, functional FOTO survey score  and all within a reasonable time frame so as to further increase their functional independence with ADLs and activities for carryover to  Improved quality of life and tolerance to household chores and community activities. Patient requires skilled Physical Therapy so as to monitor their response to and modify their treatment plan accordingly. Patient appears to be an appropriate candidate for skilled outpatient Physical Therapy.       Problem List: pain affecting function, decrease ROM, decrease strength, edema affecting function, decrease ADL/ functional abilitiies, decrease activity tolerance and decrease flexibility/ joint mobility   Treatment Plan: Therapeutic exercise, Therapeutic activities, Neuromuscular re-education, Physical agent/modality, Manual therapy, Patient education, Self Care training and Home safety training    Patient Goal (s) has been updated and includes: continue to increase strength and ROM      Updated Goals to be accomplished in 14 treatments:  Continue with eval goals    Frequency / Duration: Patient to be seen 2-3 times per week for 14 treatments on original plan          Rebekah Azevedo, PT 2/18/2019 3:48 PM

## 2019-02-18 NOTE — PROGRESS NOTES
PT DAILY TREATMENT NOTE 10-18    Patient Name: Chuck Ascencio  Date:2019  : 1950  [x]  Patient  Verified  Payor: Vernon Champion / Plan: [place] MEDICARE PART A & B / Product Type: Medicare /    In time:236  Out time:332  Total Treatment Time (min): 51  Visit #: 10 of 24    Medicare/BCBS Only   Total Timed Codes (min):  41 1:1 Treatment Time:  41       Treatment Area: Pain in right wrist [M25.531]    SUBJECTIVE  Pain Level (0-10 scale): 2  Any medication changes, allergies to medications, adverse drug reactions, diagnosis change, or new procedure performed?: [x] No    [] Yes (see summary sheet for update)  Subjective functional status/changes:   [] No changes reported  Reports soreness along 5th digit and ulnar styloid region after increase to 3# . Notes better tolerance with 2#.      OBJECTIVE    Modality rationale: decrease pain and increase tissue extensibility to improve the patients ability to aid with increase tolerance to ADLS and activities   Type Additional Details   [] Estim:  []Unatt       []IFC  []Premod                        []Other:  []w/ice   []w/heat  Position:  Location:   [] Estim: []Att    []TENS instruct  []NMES                    []Other:  []w/US   []w/ice   []w/heat  Position:  Location:   []  Traction: [] Cervical       []Lumbar                       [] Prone          []Supine                       []Intermittent   []Continuous Lbs:  [] before manual  [] after manual   []  Ultrasound: []Continuous   [] Pulsed                           []1MHz   []3MHz W/cm2:  Location:   []  Iontophoresis with dexamethasone         Location: [] Take home patch   [] In clinic   [x]  Ice   post  []  heat  []  Ice massage  []  Laser   []  Anodyne Position:seated   Location:right wrist and hand   []  Laser with stim  []  Other:  Position:  Location:   []  Vasopneumatic Device Pressure:       [] lo [] med [] hi   Temperature: [] lo [] med [] hi   [] Skin assessment post-treatment:  []intact []redness- no adverse reaction    []redness - adverse reaction:      min []Eval                  []Re-Eval       18 min Therapeutic Exercise:  [x] See flow sheet :   Rationale: increase ROM, increase strength and improve coordination to improve the patients ability to aid with increase tolerance to ADLS and activities    8 min Therapeutic Activity:  []  See flow sheet :   Rationale:   to improve the patients ability to       min Neuromuscular Re-education:  []  See flow sheet :   Rationale:   to improve the patients ability to     15 min Manual Therapy:  Effleurage , joint mobs for wrist F/E UD/RD   Rationale: decrease pain, increase ROM, increase tissue extensibility and decrease edema  to aid with increase tolerance to ADLS and activities     min Gait Training:  ___ feet with ___ device on level surfaces with ___ level of assist   Rationale: With   [] TE   [] TA   [] neuro   [] other: Patient Education: [x] Review HEP    [] Progressed/Changed HEP based on:   [] positioning   [] body mechanics   [] transfers   [] heat/ice application    [] other:      Other Objective/Functional Measures: VC exercises and technique FOTO 49    Pain Level (0-10 scale) post treatment: 0    ASSESSMENT/Changes in Function: increase AROM PROM , tolerated all exercises well. Patient will continue to benefit from skilled PT services to modify and progress therapeutic interventions, address ROM deficits, address strength deficits, analyze and address soft tissue restrictions, analyze and cue movement patterns, analyze and modify body mechanics/ergonomics, assess and modify postural abnormalities and instruct in home and community integration to attain remaining goals. []  See Plan of Care  []  See progress note/recertification  []  See Discharge Summary         Progress towards goals / Updated goals:   Short Term Goals: To be accomplished in 2 treatments:  1.  Pt will report compliance and independence to [facility] to help the pt manage their pain and symptoms. Greg Bull  Current:MET  1/31/19 2/15/19  2/18/19  Long Term Goals: To be accomplished in 24 treatments:  1. Pt will increase FOTO score to 63 points to improve ability to perform ADLs. Eval: 63 points  Current: 49  2. Pt will increase PROM right wrist flex to 45 degs, EXT to at least 40 degs to improve ability to tolerate dressing activities. Eval: flex 32 degs, EXT 23 degs; both with increased right anterior wrist pain  Current: right wrist flex to 50 degs, EXT  45 2/18/19  3. Pt will increase AROM right wrist flex to at least 30 degs, EXT to at least 20 degs, supination to [facility] to improve ability to tolerate functional tasks. Eval: flex 8 degs, EXT neutral, supination 57 degs; all with increased right anterior wrist pain  Current: AROM wrist F 40 and E 35 supin 70 2/18/19  4. Pt will report being able to drive and  the steering wheel with the right hand without the brace on her right wrist to improve ease of driving.   Eval: reports using her brace with driving and has trouble gripping  Current: not using brace, able to  wheel ok but ROM limits turning wheel both right and left.  2/18/19        PLAN  [x]  Upgrade activities as tolerated     [x]  Continue plan of care  []  Update interventions per flow sheet       []  Discharge due to:_  []  Other:_      Juan Luis Melendez, PT 2/18/2019  2:38 PM    Future Appointments   Date Time Provider Manasa Meza   2/20/2019  3:00 PM Catherine Ortega, PT [facility]   2/22/2019  2:30 PM Catherine Ortega, PT [facility]   2/25/2019  2:30 PM Lili Henley PTA [facility]   2/27/2019  3:00 PM Catherine Ortega, PT [facility]   3/1/2019  2:30 PM Catherine Ortega, PT [facility]

## 2019-02-20 ENCOUNTER — HOSPITAL ENCOUNTER (OUTPATIENT)
Dept: PHYSICAL THERAPY | Age: 69
Discharge: HOME | End: 2019-02-20
Payer: MEDICARE

## 2019-02-20 PROCEDURE — 97110 THERAPEUTIC EXERCISES: CPT

## 2019-02-20 PROCEDURE — 97140 MANUAL THERAPY 1/> REGIONS: CPT

## 2019-02-20 NOTE — PROGRESS NOTES
PT DAILY TREATMENT NOTE 10-18    Patient Name: Zabrina Fill  Date:2019  : 1950  [x]  Patient  Verified  Payor: Tanya Junior / Plan: [place] / Product Type: Medicare /    In time:3:00  Out time:3:45  Total Treatment Time (min): 45  Visit #: 11 of 24    Medicare/BCBS Only   Total Timed Codes (min):  45 1:1 Treatment Time:  45       Treatment Area: Pain in right wrist [M25.531]    SUBJECTIVE  Pain Level (0-10 scale): 2  Any medication changes, allergies to medications, adverse drug reactions, diagnosis change, or new procedure performed?: [x] No    [] Yes (see summary sheet for update)  Subjective functional status/changes:   [] No changes reported  States she was sore over the weekend but has been better that past few days    OBJECTIVE     35 min Therapeutic Exercise:  [x] See flow sheet :   Rationale: increase ROM, increase strength and increase proprioception to improve the patients ability to perform daily household chores. 10 min Manual Therapy:  DTM/STM effleurage to right wrist/ hand/ fingers, passive extension stretch, pronation/ supination stretch   Rationale: decrease pain, increase ROM and increase tissue extensibility to assisst with performing daily functional tasks such as cooking/ cleaning      With   [] TE   [] TA   [] neuro   [] other: Patient Education: [x] Review HEP    [] Progressed/Changed HEP based on:   [] positioning   [] body mechanics   [] transfers   [] heat/ice application    [] other:      Other Objective/Functional Measures: Tolerated all therex well with no changes in symptoms   Added bolts/ pegs with good tolerance, difficulty with unscrewing      Pain Level (0-10 scale) post treatment: <2    ASSESSMENT/Changes in Function: Patient continues to show improvements with signs/ symptoms however still demonstrates a decrease in strength, impaired ROM, and an increase in pain with functional activities.        Patient will continue to benefit from skilled PT services to modify and progress therapeutic interventions, address functional mobility deficits, address strength deficits, analyze and address soft tissue restrictions, analyze and cue movement patterns and analyze and modify body mechanics/ergonomics to attain remaining goals. [x]  See Plan of Care  []  See progress note/recertification  []  See Discharge Summary         Progress towards goals / Updated goals:  Short Term Goals: To be accomplished in 2 treatments:  1. Pt will report compliance and independence to [facility] to help the pt manage their pain and symptoms. Donny Weir  Current:MET  1/31/19 2/15/19  2/18/19  Long Term Goals: To be accomplished in 24 treatments:  1. Pt will increase FOTO score to 63 points to improve ability to perform ADLs. Eval: 63 points  Current: 49  2. Pt will increase PROM right wrist flex to 45 degs, EXT to at least 40 degs to improve ability to tolerate dressing activities. Eval: flex 32 degs, EXT 23 degs; both with increased right anterior wrist pain  Current: right wrist flex to 50 degs, EXT  45 2/18/19  3. Pt will increase AROM right wrist flex to at least 30 degs, EXT to at least 20 degs, supination to [facility] to improve ability to tolerate functional tasks. Eval: flex 8 degs, EXT neutral, supination 57 degs; all with increased right anterior wrist pain  Current: AROM wrist F 40 and E 35 supin 70 2/18/19  4. Pt will report being able to drive and  the steering wheel with the right hand without the brace on her right wrist to improve ease of driving.   Eval: reports using her brace with driving and has trouble gripping  Current: not using brace, able to  wheel ok but ROM limits turning wheel both right and left.  2/18/19        PLAN  [x]  Upgrade activities as tolerated     [x]  Continue plan of care  []  Update interventions per flow sheet       []  Discharge due to:_  []  Other:_      Cate Sanchez, PT 2/20/2019  1:15 PM    Future Appointments Date Time Provider Manasa Meza   2/20/2019  3:00 PM Rashard Garcia, PT [facility]   2/22/2019  2:30 PM Rashard Garcia, PT [facility]   2/25/2019  2:30 PM Francisco Estimable, PTA [facility]   2/27/2019  3:00 PM Rashard Garcia, PT [facility]   3/1/2019  2:30 PM Rashard Garcia, PT [facility]

## 2019-02-22 ENCOUNTER — HOSPITAL ENCOUNTER (OUTPATIENT)
Dept: PHYSICAL THERAPY | Age: 69
Discharge: HOME | End: 2019-02-22
Payer: MEDICARE

## 2019-02-22 PROCEDURE — 97140 MANUAL THERAPY 1/> REGIONS: CPT

## 2019-02-22 PROCEDURE — 97110 THERAPEUTIC EXERCISES: CPT

## 2019-02-22 NOTE — PROGRESS NOTES
PT DAILY TREATMENT NOTE 10-18    Patient Name: Andre Weir  Date:2019  : 1950  [x]  Patient  Verified  Payor: Ron Russ / Plan: [place] MEDICARE PART A & B / Product Type: Medicare /    In time:2:33  Out time:3:13  Total Treatment Time (min): 40  Visit #: 12 of 24    Medicare/BCBS Only   Total Timed Codes (min):  40 1:1 Treatment Time:  40       Treatment Area: Pain in right wrist [M25.531]    SUBJECTIVE  Pain Level (0-10 scale): 2  Any medication changes, allergies to medications, adverse drug reactions, diagnosis change, or new procedure performed?: [x] No    [] Yes (see summary sheet for update)  Subjective functional status/changes:   [] No changes reported  States she bumped her hand on the table yesterday and now it is sore. OBJECTIVE      30 min Therapeutic Exercise:  [x] See flow sheet :   Rationale: increase ROM, increase strength and increase proprioception to improve the patients ability to perform daily household chores. 10 min Manual Therapy:  PROM with overpressure stretch flexion/ extension/ RD/ UD, DTM / effleurage to wrist and all 5 digits for swelling   Rationale: decrease pain, increase ROM and increase tissue extensibility to assist with daily functional activities such as cooking/ cleaning      With   [] TE   [] TA   [] neuro   [] other: Patient Education: [x] Review HEP    [] Progressed/Changed HEP based on:   [] positioning   [] body mechanics   [] transfers   [] heat/ice application    [] other:      Other Objective/Functional Measures: Tolerated all therex well, no changes in symptoms throughout  Reports of soreness with wrist curls using 2# weight      Pain Level (0-10 scale) post treatment: <2    ASSESSMENT/Changes in Function: Patient continues to show improvements with signs/ symptoms however still demonstrates a decrease in strength, impaired ROM, and an increase in pain with functional activities.        Patient will continue to benefit from skilled PT services to modify and progress therapeutic interventions, address functional mobility deficits, address strength deficits, analyze and cue movement patterns and analyze and modify body mechanics/ergonomics to attain remaining goals. [x]  See Plan of Care  []  See progress note/recertification  []  See Discharge Summary         Progress towards goals / Updated goals:  Short Term Goals: To be accomplished in 2 treatments:  1. Pt will report compliance and independence to [facility] to help the pt manage their pain and symptoms. Eval: Established          Current:MET  1/31/19 2/15/19  2/18/19  Long Term Goals: To be accomplished in 24 treatments:  1. Pt will increase FOTO score to 63 points to improve ability to perform ADLs. Eval: 63 points  Current: 49  2. Pt will increase PROM right wrist flex to 45 degs, EXT to at least 40 degs to improve ability to tolerate dressing activities. Eval: flex 32 degs, EXT 23 degs; both with increased right anterior wrist pain  Current: right wrist flex to 50 degs, EXT  45 2/18/19  3. Pt will increase AROM right wrist flex to at least 30 degs, EXT to at least 20 degs, supination to [facility] to improve ability to tolerate functional tasks. Eval: flex 8 degs, EXT neutral, supination 57 degs; all with increased right anterior wrist pain  Current: AROM wrist F 40 and E 35 supin 70 2/18/19  4.  Pt will report being able to drive and  the steering wheel with the right hand without the brace on her right wrist to improve ease of driving.   Eval: reports using her brace with driving and has trouble gripping  Current: not using brace, able to  wheel ok but ROM limits turning wheel both right and left. 2/18/19           PLAN  [x]  Upgrade activities as tolerated     [x]  Continue plan of care  []  Update interventions per flow sheet       []  Discharge due to:_  []  Other:_      Cate Sanchez PT 2/22/2019  7:48 AM    Future Appointments   Date Time Provider Manasa Meza   2/22/2019 2:30 PM Noni Yoon, PT [facility]   2/25/2019  2:30 PM Juliane Halsted, SUKUMAR [facility]   2/27/2019  3:00 PM Noni Yoon, PT [facility]   3/1/2019  2:30 PM Noni Yoon, PT [facility]

## 2019-02-25 ENCOUNTER — HOSPITAL ENCOUNTER (OUTPATIENT)
Dept: PHYSICAL THERAPY | Age: 69
Discharge: HOME | End: 2019-02-25
Payer: MEDICARE

## 2019-02-25 PROCEDURE — 97110 THERAPEUTIC EXERCISES: CPT

## 2019-02-25 PROCEDURE — 97530 THERAPEUTIC ACTIVITIES: CPT

## 2019-02-25 PROCEDURE — 97140 MANUAL THERAPY 1/> REGIONS: CPT

## 2019-02-25 NOTE — PROGRESS NOTES
PT DAILY TREATMENT NOTE 10-18    Patient Name: Nathanael Giraldo  Date:2019  : 1950  [x]  Patient  Verified  Payor: Jett Gaffney / Plan: [place] MEDICARE PART A & B / Product Type: Medicare /    In time:3:00  Out time:3:53  Total Treatment Time (min): 53  Visit #: 13 of 24    Medicare/BCBS Only   Total Timed Codes (min):  53 1:1 Treatment Time:  53       Treatment Area: Pain in right wrist [M25.531]    SUBJECTIVE  Pain Level (0-10 scale): 2  Any medication changes, allergies to medications, adverse drug reactions, diagnosis change, or new procedure performed?: [x] No    [] Yes (see summary sheet for update)  Subjective functional status/changes:   [] No changes reported  States that it has been feeling better the last two days     OBJECTIVE        31 min Therapeutic Exercise:  [x] See flow sheet :   Rationale: increase ROM, increase strength and increase proprioception to improve the patients ability to assist with tolerance to ADLs    12 min Therapeutic Activity:  [x]  See flow sheet :   Rationale: increase ROM, increase strength and increase proprioception  to improve the patients ability to perform daily household chores. 10 min Manual Therapy:  DTM / effleurage to right wrist/ digits 1-5 to reduce swelling, PROM with overpressure to right wrist    Rationale: decrease pain, increase ROM and increase tissue extensibility to assist with personal hygiene tasks such as brushing teeth/ hair              With   [] TE   [] TA   [] neuro   [] other: Patient Education: [x] Review HEP    [] Progressed/Changed HEP based on:   [] positioning   [] body mechanics   [] transfers   [] heat/ice application    [] other:      Other Objective/Functional Measures:    Tolerated all therex well, added theraflex with good form   Continue with increasing exercises as able      Pain Level (0-10 scale) post treatment: 2    ASSESSMENT/Changes in Function: Patient continues to show improvements with signs/ symptoms however still demonstrates a decrease in strength, impaired ROM,  and an increase in pain with functional activities. Patient will continue to benefit from skilled PT services to modify and progress therapeutic interventions, address functional mobility deficits, address strength deficits, analyze and cue movement patterns and analyze and modify body mechanics/ergonomics to attain remaining goals. [x]  See Plan of Care  []  See progress note/recertification  []  See Discharge Summary         Progress towards goals / Updated goals:  Short Term Goals: To be accomplished in 2 treatments:  1. Pt will report compliance and independence to [facility] to help the pt manage their pain and symptoms. Eval: Established          Current:MET  1/31/19 2/15/19  2/18/19  Long Term Goals: To be accomplished in 24 treatments:  1. Pt will increase FOTO score to 63 points to improve ability to perform ADLs. Eval: 63 points  Current: 49  2. Pt will increase PROM right wrist flex to 45 degs, EXT to at least 40 degs to improve ability to tolerate dressing activities. Eval: flex 32 degs, EXT 23 degs; both with increased right anterior wrist pain  Current: right wrist flex to 50 degs, EXT  45 2/18/19  3. Pt will increase AROM right wrist flex to at least 30 degs, EXT to at least 20 degs, supination to [facility] to improve ability to tolerate functional tasks. Eval: flex 8 degs, EXT neutral, supination 57 degs; all with increased right anterior wrist pain  Current: AROM wrist F 40 and E 35 supin 70 2/18/19  4.  Pt will report being able to drive and  the steering wheel with the right hand without the brace on her right wrist to improve ease of driving.   Eval: reports using her brace with driving and has trouble gripping  Current: not using brace, able to  wheel ok but ROM limits turning wheel both right and left. 2/18/19           PLAN  [x]  Upgrade activities as tolerated     [x]  Continue plan of care  []  Update interventions per flow sheet       []  Discharge due to:_  []  Other:_      Bobbi Roberson, PT 2/25/2019  1:51 PM    Future Appointments   Date Time Provider Manasa Meza   2/25/2019  3:00 PM Mahi Basilio, PT [facility]   2/27/2019  3:00 PM Mahi Basilio, PT [facility]   3/1/2019  9:30 AM Meenu Metcalf, PTA [facility]

## 2019-02-27 ENCOUNTER — HOSPITAL ENCOUNTER (OUTPATIENT)
Dept: PHYSICAL THERAPY | Age: 69
Discharge: HOME | End: 2019-02-27
Payer: MEDICARE

## 2019-02-27 PROCEDURE — 97110 THERAPEUTIC EXERCISES: CPT

## 2019-02-27 PROCEDURE — 97140 MANUAL THERAPY 1/> REGIONS: CPT

## 2019-02-27 NOTE — PROGRESS NOTES
PT DAILY TREATMENT NOTE 10-18    Patient Name: Kris Ocampo  Date:2019  : 1950  [x]  Patient  Verified  Payor: [place] MEDICARE / Plan: [place] MEDICARE PART A & B / Product Type: Medicare /    In time:3:01  Out time:3:41  Total Treatment Time (min): 40  Visit #: 14 of 24    Medicare/BCBS Only   Total Timed Codes (min):  40 1:1 Treatment Time:  39       Treatment Area: Pain in right wrist [M25.531]    SUBJECTIVE  Pain Level (0-10 scale): 1  Any medication changes, allergies to medications, adverse drug reactions, diagnosis change, or new procedure performed?: [x] No    [] Yes (see summary sheet for update)  Subjective functional status/changes:   [] No changes reported  States she is doing well today    OBJECTIVE        32 min Therapeutic Exercise:  [x] See flow sheet :   Rationale: increase ROM, increase strength and increase proprioception to improve the patients ability to perform daily household chores. 8 min Manual Therapy:  DTM/STM effleurage, PROM with overpressure flexion, extension, Rd, UD, pronation, supination   Rationale: decrease pain, increase ROM and increase tissue extensibility to assist with tolerance to ADLs      With   [] TE   [] TA   [] neuro   [] other: Patient Education: [x] Review HEP    [] Progressed/Changed HEP based on:   [] positioning   [] body mechanics   [] transfers   [] heat/ice application    [] other:      Other Objective/Functional Measures: Tolerated all therex well- no changes in symptoms   Progress exercise as tolerated     Pain Level (0-10 scale) post treatment: 1    ASSESSMENT/Changes in Function: Patient continues to show improvements with signs/ symptoms however still demonstrates a decrease in strength, impaired ROM, and an increase in pain with functional activities.        Patient will continue to benefit from skilled PT services to modify and progress therapeutic interventions, address functional mobility deficits, address strength deficits, analyze and cue movement patterns and analyze and modify body mechanics/ergonomics to attain remaining goals. [x]  See Plan of Care  []  See progress note/recertification  []  See Discharge Summary         Progress towards goals / Updated goals:  Short Term Goals: To be accomplished in 2 treatments:  1. Pt will report compliance and independence to [facility] to help the pt manage their pain and symptoms. Eval: Established          Current:MET  1/31/19 2/15/19  2/18/19  Long Term Goals: To be accomplished in 24 treatments:  1. Pt will increase FOTO score to 63 points to improve ability to perform ADLs. Eval: 63 points  Current: 49  2. Pt will increase PROM right wrist flex to 45 degs, EXT to at least 40 degs to improve ability to tolerate dressing activities. Eval: flex 32 degs, EXT 23 degs; both with increased right anterior wrist pain  Current: right wrist flex to 50 degs, EXT  45 2/18/19  3. Pt will increase AROM right wrist flex to at least 30 degs, EXT to at least 20 degs, supination to [facility] to improve ability to tolerate functional tasks. Eval: flex 8 degs, EXT neutral, supination 57 degs; all with increased right anterior wrist pain  Current: AROM wrist F 40 and E 35 supin 70 2/18/19  4.  Pt will report being able to drive and  the steering wheel with the right hand without the brace on her right wrist to improve ease of driving.   Eval: reports using her brace with driving and has trouble gripping  Current: not using brace, able to  wheel ok but ROM limits turning wheel both right and left. 2/18/19           PLAN  [x]  Upgrade activities as tolerated     [x]  Continue plan of care  []  Update interventions per flow sheet       []  Discharge due to:_  []  Other:_      Katie Robison, PT 2/27/2019  12:45 PM    Future Appointments   Date Time Provider Manasa Meza   2/27/2019  3:00 PM Catherine Ortega, PT [facility]   3/1/2019  9:30 AM Meenu Metcalf PTA [facility]

## 2019-03-01 ENCOUNTER — HOSPITAL ENCOUNTER (OUTPATIENT)
Dept: PHYSICAL THERAPY | Age: 69
Discharge: HOME | End: 2019-03-01
Payer: MEDICARE

## 2019-03-01 PROCEDURE — 97110 THERAPEUTIC EXERCISES: CPT

## 2019-03-01 PROCEDURE — 97140 MANUAL THERAPY 1/> REGIONS: CPT

## 2019-03-01 NOTE — PROGRESS NOTES
PT DAILY TREATMENT NOTE 10-18    Patient Name: Alannah Mercedes  Date:3/1/2019  : 1950  [x]  Patient  Verified  Payor: [place] MEDICARE / Plan: [place] MEDICARE PART A & B / Product Type: Medicare /    In time:  9:32 Out time:10:31  Total Treatment Time (min): 42  Visit #: 15of 24    Medicare/BCBS Only   Total Timed Codes (min):  42 1:1 Treatment Time:  23       Treatment Area: Pain in right wrist [M25.531]    SUBJECTIVE  Pain Level (0-10 scale): 1  Any medication changes, allergies to medications, adverse drug reactions, diagnosis change, or new procedure performed?: [x] No    [] Yes (see summary sheet for update)  Subjective functional status/changes:   [] No changes reported  It been a  Good  Week.     OBJECTIVE    Modality rationale: decrease edema, decrease inflammation, decrease pain and increase tissue extensibility to improve the patients ability to perform ADL    Type Additional Details   [] Estim:  []Unatt       []IFC  []Premod                        []Other:  []w/ice   []w/heat  Position:  Location:   [] Estim: []Att    []TENS instruct  []NMES                    []Other:  []w/US   []w/ice   []w/heat  Position:  Location:   []  Traction: [] Cervical       []Lumbar                       [] Prone          []Supine                       []Intermittent   []Continuous Lbs:  [] before manual  [] after manual   []  Ultrasound: []Continuous   [] Pulsed                           []1MHz   []3MHz W/cm2:  Location:   []  Iontophoresis with dexamethasone         Location: [] Take home patch   [] In clinic   []  Ice     []  heat  []  Ice massage  []  Laser   []  Anodyne Position:  Location:   []  Laser with stim  []  Other:  Position:  Location:   []  Vasopneumatic Device Pressure:       [] lo [] med [] hi   Temperature: [] lo [] med [] hi   [x] Skin assessment post-treatment:  [x]intact []redness- no adverse reaction    []redness - adverse reaction:      min []Eval                  []Re-Eval       30 min Therapeutic Exercise:  [x] See flow sheet :   Rationale: increase ROM and increase strength to improve the patients ability to perform ADL     min Therapeutic Activity:  []  See flow sheet :   Rationale:   to improve the patients ability to       min Neuromuscular Re-education:  []  See flow sheet :   Rationale:   to improve the patients ability to     12 min Manual Therapy:  Effleruage/wrist  Passive  Stretch  All plane   Rationale: decrease pain, increase ROM, increase tissue extensibility and decrease edema  to perform ADL     min Gait Training:  ___ feet with ___ device on level surfaces with ___ level of assist   Rationale: With   [x] TE   [] TA   [] neuro   [] other: Patient Education: [x] Review HEP    [] Progressed/Changed HEP based on:   [] positioning   [] body mechanics   [] transfers   [] heat/ice application    [] other:      Other Objective/Functional Measures: min  edema     Pain Level (0-10 scale) post treatment:0     ASSESSMENT/Changes in Function: completed  Each there ex  Fairly  Well. Patient will continue to benefit from skilled PT services to address functional mobility deficits, address ROM deficits, address strength deficits, analyze and address soft tissue restrictions, analyze and cue movement patterns and instruct in home and community integration to attain remaining goals. [x]  See Plan of Care  []  See progress note/recertification  []  See Discharge Summary         Progress towards goals / Updated goals:  Short Term Goals: To be accomplished in 2 treatments:  1. Pt will report compliance and independence to [facility] to help the pt manage their pain and symptoms. Eval: Established          Current:MET  1/31/19 2/15/19  2/18/19  Long Term Goals: To be accomplished in 24 treatments:  1. Pt will increase FOTO score to 63 points to improve ability to perform ADLs. Eval: 63 points  Current: 49  2.  Pt will increase PROM right wrist flex to 45 degs, EXT to at least 40 degs to improve ability to tolerate dressing activities. Eval: flex 32 degs, EXT 23 degs; both with increased right anterior wrist pain  Current: right wrist flex to 50 degs, EXT  45 2/18/19  3. Pt will increase AROM right wrist flex to at least 30 degs, EXT to at least 20 degs, supination to [facility] to improve ability to tolerate functional tasks. Eval: flex 8 degs, EXT neutral, supination 57 degs; all with increased right anterior wrist pain  Current: AROM wrist F 40 and E 35 supin 70 2/18/19  4.  Pt will report being able to drive and  the steering wheel with the right hand without the brace on her right wrist to improve ease of driving.   Eval: reports using her brace with driving and has trouble gripping  Current: not using brace, able to  wheel ok but ROM limits turning wheel both right and left. 2/18/19              PLAN  [x]  Upgrade activities as tolerated     []  Continue plan of care  []  Update interventions per flow sheet       []  Discharge due to:_  []  Other:_      Meenu Metcalf PTA 3/1/2019  9:56 AM    Future Appointments   Date Time Provider Manasa Allanisti   3/5/2019  3:00 PM Shawnee uPga PT [facility]   3/7/2019 12:30 PM Angie Calderon PT [facility]   3/11/2019  2:30 PM Shawnee Puga PT [facility]   3/14/2019  2:30 PM Meenu Metcalf PTA [facility]   3/19/2019  9:30 AM Angie Calderon PT [facility]   3/21/2019 12:30 PM Angie Calderon, PT [facility]   3/26/2019 11:30 AM Angie Calderon, PT [facility]   3/28/2019 12:30 PM Angie Calderon, PT [facility]

## 2019-03-05 ENCOUNTER — HOSPITAL ENCOUNTER (OUTPATIENT)
Dept: PHYSICAL THERAPY | Age: 69
Discharge: HOME | End: 2019-03-05
Payer: MEDICARE

## 2019-03-05 PROCEDURE — 97140 MANUAL THERAPY 1/> REGIONS: CPT

## 2019-03-05 PROCEDURE — 97110 THERAPEUTIC EXERCISES: CPT

## 2019-03-05 NOTE — PROGRESS NOTES
PT DAILY TREATMENT NOTE 10-18    Patient Name: Alannah Mercedes  Date:3/5/2019  : 1950  [x]  Patient  Verified  Payor: Prieto Gallegos / Plan: [place] MED[place]ARE PART A & B / Product Type: Medicare /    In time:302  Out time:408  Total Treatment Time (min): 62  Visit #: 16 of 24    Medicare/BCBS Only   Total Timed Codes (min):  52 1:1 Treatment Time:  40       Treatment Area: Pain in right wrist [M25.531]    SUBJECTIVE  Pain Level (0-10 scale): 1  Any medication changes, allergies to medications, adverse drug reactions, diagnosis change, or new procedure performed?: [x] No    [] Yes (see summary sheet for update)  Subjective functional status/changes:   [] No changes reported  I hit my pinky/hand on the door the other day and it has been sore. I see the MD tomorrow.      OBJECTIVE    Modality rationale: decrease pain and increase tissue extensibility to improve the patients ability to aid with increase tolerance to ADLs and activities   Min Type Additional Details    [] Estim:  []Unatt       []IFC  []Premod                        []Other:  []w/ice   []w/heat  Position:  Location:    [] Estim: []Att    []TENS instruct  []NMES                    []Other:  []w/US   []w/ice   []w/heat  Position:  Location:    []  Traction: [] Cervical       []Lumbar                       [] Prone          []Supine                       []Intermittent   []Continuous Lbs:  [] before manual  [] after manual    []  Ultrasound: []Continuous   [] Pulsed                           []1MHz   []3MHz W/cm2:  Location:    []  Iontophoresis with dexamethasone         Location: [] Take home patch   [] In clinic   10 [x]  Ice  post []  heat  []  Ice massage  []  Laser   []  Anodyne Position:seated  Location:right hand and wrist    []  Laser with stim  []  Other:  Position:  Location:    []  Vasopneumatic Device Pressure:       [] lo [] med [] hi   Temperature: [] lo [] med [] hi   [] Skin assessment post-treatment:  []intact []redness- no adverse reaction    []redness - adverse reaction:      min []Eval                  []Re-Eval       34 min Therapeutic Exercise:  [x] See flow sheet :   Rationale: increase ROM, increase strength and improve coordination to improve the patients ability to aid with increase tolerance to ADLs and activities     min Therapeutic Activity:  []  See flow sheet :   Rationale:   to improve the patients ability to       min Neuromuscular Re-education:  []  See flow sheet :   Rationale:   to improve the patients ability to     18 min Manual Therapy:   Effleruage/wrist  DTM/STM effleurage, PROM with overpressure flexion, extension, Rd, UD, pronation, supination   Rationale: decrease pain, increase ROM, increase tissue extensibility and decrease edema  to aid with increase tolerance to ADLs and activities     min Gait Training:  ___ feet with ___ device on level surfaces with ___ level of assist   Rationale: With   [] TE   [] TA   [] neuro   [] other: Patient Education: [x] Review HEP    [] Progressed/Changed HEP based on:   [] positioning   [] body mechanics   [] transfers   [] heat/ice application    [] other:      Other Objective/Functional Measures: VC exercises and technique     Pain Level (0-10 scale) post treatment: 1    ASSESSMENT/Changes in Function: tolerated well. Patient will continue to benefit from skilled PT services to modify and progress therapeutic interventions, address functional mobility deficits, address ROM deficits, address strength deficits and analyze and address soft tissue restrictions to attain remaining goals. [x]  See Plan of Care  [x]  See progress note/recertification  []  See Discharge Summary         Progress towards goals / Updated goals:  Short Term Goals: To be accomplished in 2 treatments:  1. Pt will report compliance and independence to HEP to help the pt manage their pain and symptoms.   Eval: Established          Current:MET  1/31/19 2/15/19  2/18/19 3/5/19  Long Term Goals: To be accomplished in 24 treatments:  1. Pt will increase FOTO score to 63 points to improve ability to perform ADLs. Eval: 63 points  Current: 49  2. Pt will increase PROM right wrist flex to 45 degs, EXT to at least 40 degs to improve ability to tolerate dressing activities. Eval: flex 32 degs, EXT 23 degs; both with increased right anterior wrist pain  Current: right wrist flex to 50 degs, EXT  45 2/18/19  3. Pt will increase AROM right wrist flex to at least 30 degs, EXT to at least 20 degs, supination to [facility] to improve ability to tolerate functional tasks. Eval: flex 8 degs, EXT neutral, supination 57 degs; all with increased right anterior wrist pain  Current: AROM wrist F 40 and E 35 supin 70 2/18/19  4.  Pt will report being able to drive and  the steering wheel with the right hand without the brace on her right wrist to improve ease of driving.   Eval: reports using her brace with driving and has trouble gripping  Current: not using brace, able to  wheel ok but ROM limits turning wheel both right and left. 2/18/19           PLAN  [x]  Upgrade activities as tolerated     [x]  Continue plan of care  []  Update interventions per flow sheet       []  Discharge due to:_  []  Other:_      Loraine Walton, PT 3/5/2019  2:58 PM    Future Appointments   Date Time Provider Manasa Meza   3/5/2019  3:00 PM Frannie Dong PT [facility]   3/7/2019 12:30 PM Nas Mcgovern, PT [facility]   3/11/2019  2:30 PM Frannie Dong PT [facility]   3/14/2019  2:30 PM Tea, [address] [place] [address], [facility]   3/19/2019  9:30 AM Nas Mcgovern, PT [facility]   3/21/2019 12:30 PM Nas Mcgovern PT [facility]   3/26/2019 11:30 AM Nas Mcgovern, PT [facility]   3/28/2019 12:30 PM Nas Mcgovern PT [facility]

## 2019-03-07 ENCOUNTER — HOSPITAL ENCOUNTER (OUTPATIENT)
Dept: PHYSICAL THERAPY | Age: 69
Discharge: HOME | End: 2019-03-07
Payer: MEDICARE

## 2019-03-07 PROCEDURE — 97140 MANUAL THERAPY 1/> REGIONS: CPT

## 2019-03-07 PROCEDURE — 97530 THERAPEUTIC ACTIVITIES: CPT

## 2019-03-07 PROCEDURE — 97110 THERAPEUTIC EXERCISES: CPT

## 2019-03-07 NOTE — PROGRESS NOTES
PT DAILY TREATMENT NOTE 10-18    Patient Name: Leo Ramos  Date:3/7/2019  : 1950  [x]  Patient  Verified  Payor: [place] MEDICARE / Plan: [place] MEDICARE PART A & B / Product Type: Medicare /    In time: 12:30  Out time: 1:19  Total Treatment Time (min): 49  Visit #: 17 of 24    Medicare/BCBS Only   Total Timed Codes (min):  49 1:1 Treatment Time:  40       Treatment Area: Pain in right wrist [M25.531]    SUBJECTIVE  Pain Level (0-10 scale): 5  Any medication changes, allergies to medications, adverse drug reactions, diagnosis change, or new procedure performed?: [x] No    [] Yes (see summary sheet for update)  Subjective functional status/changes:   [] No changes reported  States she has been doing well    OBJECTIVE          22 min Therapeutic Exercise:  [x] See flow sheet :   Rationale: increase ROM, increase strength, improve coordination and increase proprioception to improve the patients ability to perform daily household chores. 15 min Therapeutic Activity:  [x]  See flow sheet :   Rationale: increase strength, improve coordination, improve balance and increase proprioception  to improve the patients ability to assist with performing daily functional activities    12 min Manual Therapy:  DTM/ effleurage to reduce swelling, PROM with stretch flexion/ extension/ RD/ UD   Rationale: decrease pain, increase ROM and increase tissue extensibility to assist with personal hygiene tasks such as brushing hair      With   [] TE   [] TA   [] neuro   [] other: Patient Education: [x] Review HEP    [] Progressed/Changed HEP based on:   [] positioning   [] body mechanics   [] transfers   [] heat/ice application    [] other:      Other Objective/Functional Measures:    Tolerated all therex well with proper form     Pain Level (0-10 scale) post treatment: 1    ASSESSMENT/Changes in Function: Patient continues to show improvements with signs/ symptoms however still demonstrates a decrease in strength, impaired ROM, and an increase in pain with functional activities. Patient will continue to benefit from skilled PT services to modify and progress therapeutic interventions, address functional mobility deficits, address strength deficits, analyze and cue movement patterns and analyze and modify body mechanics/ergonomics to attain remaining goals. [x]  See Plan of Care  []  See progress note/recertification  []  See Discharge Summary         Progress towards goals / Updated goals:  Short Term Goals: To be accomplished in 2 treatments:  1. Pt will report compliance and independence to [facility] to help the pt manage their pain and symptoms. Eval: Established          Current:MET  1/31/19 2/15/19  2/18/19 3/5/19  Long Term Goals: To be accomplished in 24 treatments:  1. Pt will increase FOTO score to 63 points to improve ability to perform ADLs. Eval: 63 points  Current: 49  2. Pt will increase PROM right wrist flex to 45 degs, EXT to at least 40 degs to improve ability to tolerate dressing activities. Eval: flex 32 degs, EXT 23 degs; both with increased right anterior wrist pain  Current: right wrist flex to 50 degs, EXT  45 2/18/19  3. Pt will increase AROM right wrist flex to at least 30 degs, EXT to at least 20 degs, supination to [facility] to improve ability to tolerate functional tasks. Eval: flex 8 degs, EXT neutral, supination 57 degs; all with increased right anterior wrist pain  Current: AROM wrist F 40 and E 35 supin 70 2/18/19  4.  Pt will report being able to drive and  the steering wheel with the right hand without the brace on her right wrist to improve ease of driving.   Eval: reports using her brace with driving and has trouble gripping  Current: not using brace, able to  wheel ok but ROM limits turning wheel both right and left. 2/18/19        PLAN  [x]  Upgrade activities as tolerated     [x]  Continue plan of care  []  Update interventions per flow sheet       []  Discharge due to:_  []  Other:_ Jenifer Silva, PT 3/7/2019  7:52 AM    Future Appointments   Date Time Provider Manasa Susana   3/7/2019 12:30 PM Rashard Garcia, PT [facility] [address]   3/11/2019  2:30 PM Bayron West, PT [facility] [address]   3/14/2019  2:30 PM Maryam Ryder, PTA [facility] [address]   3/19/2019  9:30 AM Rashard Garcia, PT [facility] [address]   3/21/2019 12:30 PM Rashard Garcia, PT [facility] [address]   3/26/2019 11:30 AM Rashard Garcia, PT [facility] [address]   3/28/2019 12:30 PM Rashard Garcia, PT [facility] [address]

## 2019-03-11 ENCOUNTER — HOSPITAL ENCOUNTER (OUTPATIENT)
Dept: PHYSICAL THERAPY | Age: 69
Discharge: HOME | End: 2019-03-11
Payer: MEDICARE

## 2019-03-11 PROCEDURE — 97140 MANUAL THERAPY 1/> REGIONS: CPT

## 2019-03-11 PROCEDURE — 97110 THERAPEUTIC EXERCISES: CPT

## 2019-03-11 NOTE — PROGRESS NOTES
PT DAILY TREATMENT NOTE 10-18    Patient Name: Anya Leung  Date:3/11/2019  : 1950  [x]  Patient  Verified  Payor: Kacey Ocampo / Plan: [place] MEDICARE PART A & B / Product Type: Medicare /    In time:236  Out time:341  Total Treatment Time (min): 64  Visit #: 18 of 24    Medicare/BCBS Only   Total Timed Codes (min):  54 1:1 Treatment Time:  28       Treatment Area: Pain in right wrist [M25.531]    SUBJECTIVE  Pain Level (0-10 scale):  2  Any medication changes, allergies to medications, adverse drug reactions, diagnosis change, or new procedure performed?: [x] No    [] Yes (see summary sheet for update)  Subjective functional status/changes:   [] No changes reported  Doing better , still sore on the pinky side of my hand    OBJECTIVE    Modality rationale: decrease pain to improve the patients ability to aid with increase tolerance to ADLS and activities   Min Type Additional Details    [] Estim:  []Unatt       []IFC  []Premod                        []Other:  []w/ice   []w/heat  Position:  Location:    [] Estim: []Att    []TENS instruct  []NMES                    []Other:  []w/US   []w/ice   []w/heat  Position:  Location:    []  Traction: [] Cervical       []Lumbar                       [] Prone          []Supine                       []Intermittent   []Continuous Lbs:  [] before manual  [] after manual    []  Ultrasound: []Continuous   [] Pulsed                           []1MHz   []3MHz W/cm2:  Location:    []  Iontophoresis with dexamethasone         Location: [] Take home patch   [] In clinic   10 [x]  Ice   post  []  heat  []  Ice massage  []  Laser   []  Anodyne Position:seated  Location:right hand , wrist, forearm    []  Laser with stim  []  Other:  Position:  Location:    []  Vasopneumatic Device Pressure:       [] lo [] med [] hi   Temperature: [] lo [] med [] hi   [] Skin assessment post-treatment:  []intact []redness- no adverse reaction    []redness - adverse reaction:      min []Eval []Re-Eval       34 min Therapeutic Exercise:  [x] See flow sheet :   Rationale: increase ROM, increase strength and improve coordination to improve the patients ability to aid with increase tolerance to ADLS and activities     min Therapeutic Activity:  []  See flow sheet :   Rationale:   to improve the patients ability to       min Neuromuscular Re-education:  []  See flow sheet :   Rationale:   to improve the patients ability to     20 min Manual Therapy:  Effleruage/wrist  DTM/STM effleurage, PROM with overpressure flexion, extension, Rd, UD, pronation, supination   Rationale: decrease pain, increase ROM and increase tissue extensibility to aid with increase tolerance to ADLs and activities     min Gait Training:  ___ feet with ___ device on level surfaces with ___ level of assist   Rationale: With   [] TE   [] TA   [] neuro   [] other: Patient Education: [x] Review HEP    [] Progressed/Changed HEP based on:   [] positioning   [] body mechanics   [] transfers   [] heat/ice application    [] other:      Other Objective/Functional Measures: VC exercises and technique     Pain Level (0-10 scale) post treatment: <2    ASSESSMENT/Changes in Function: tolerated well. Residual pain right hand. Patient will continue to benefit from skilled PT services to modify and progress therapeutic interventions, address ROM deficits, address strength deficits, analyze and address soft tissue restrictions, analyze and modify body mechanics/ergonomics, assess and modify postural abnormalities and instruct in home and community integration to attain remaining goals. []  See Plan of Care  []  See progress note/recertification  []  See Discharge Summary         Progress towards goals / Updated goals:  Short Term Goals: To be accomplished in 2 treatments:  1. Pt will report compliance and independence to HEP to help the pt manage their pain and symptoms.   Eval: Established          Current:MET  1/31/19 2/15/19  2/18/19 3/5/19  Long Term Goals: To be accomplished in 24 treatments:  1. Pt will increase FOTO score to 63 points to improve ability to perform ADLs. Eval: 63 points  Current: 49 2/18/19  2. Pt will increase PROM right wrist flex to 45 degs, EXT to at least 40 degs to improve ability to tolerate dressing activities. Eval: flex 32 degs, EXT 23 degs; both with increased right anterior wrist pain  Current: right wrist flex to 50 degs, EXT  45 2/18/19  3. Pt will increase AROM right wrist flex to at least 30 degs, EXT to at least 20 degs, supination to [facility] to improve ability to tolerate functional tasks. Eval: flex 8 degs, EXT neutral, supination 57 degs; all with increased right anterior wrist pain  Current: AROM wrist F 40 and E 35 supin 70 2/18/19  4.  Pt will report being able to drive and  the steering wheel with the right hand without the brace on her right wrist to improve ease of driving.   Eval: reports using her brace with driving and has trouble gripping  Current: not using brace, able to  wheel ok but ROM limits turning wheel both right and left. 2/18/19  3/11/19              PLAN  [x]  Upgrade activities as tolerated     [x]  Continue plan of care  []  Update interventions per flow sheet       []  Discharge due to:_  []  Other:_      Soledad Walton, PT 3/11/2019  2:58 PM    Future Appointments   Date Time Provider Manasa Meza   3/14/2019  2:30 PM Daisy Worrell PTA [facility]   3/19/2019  9:30 AM Neena Lerma, PT [facility]   3/21/2019 12:30 PM Neena Lerma, PT [facility]   3/26/2019 11:30 AM Neena Lerma, PT [facility]   3/28/2019 12:30 PM Neena Lerma, MIKA [facility]

## 2019-03-14 ENCOUNTER — HOSPITAL ENCOUNTER (OUTPATIENT)
Dept: PHYSICAL THERAPY | Age: 69
Discharge: HOME | End: 2019-03-14
Payer: MEDICARE

## 2019-03-14 PROCEDURE — 97110 THERAPEUTIC EXERCISES: CPT

## 2019-03-14 PROCEDURE — 97140 MANUAL THERAPY 1/> REGIONS: CPT

## 2019-03-14 NOTE — PROGRESS NOTES
PT DAILY TREATMENT NOTE 10-18    Patient Name: Parker Howard  Date:3/14/2019  : 1950  [x]  Patient  Verified  Payor: [place] MEDICARE / Plan: [place] MEDICARE PART A & B / Product Type: Medicare /    In time:2:30   Out time:3:31  Total Treatment Time (min): 56  Visit #: 19 of 24    Medicare/BCBS Only   Total Timed Codes (min):  56 1:1 Treatment Time:  46       Treatment Area: Pain in right wrist [M25.531]    SUBJECTIVE  Pain Level (0-10 scale): 1  Any medication changes, allergies to medications, adverse drug reactions, diagnosis change, or new procedure performed?: [x] No    [] Yes (see summary sheet for update)  Subjective functional status/changes:   [] No changes reported  Getting  Better.     OBJECTIVE    Modality rationale: decrease edema, decrease inflammation, decrease pain and increase tissue extensibility to improve the patients ability to perform ADL    Type Additional Details   [] Estim:  []Unatt       []IFC  []Premod                        []Other:  []w/ice   []w/heat  Position:  Location:   [] Estim: []Att    []TENS instruct  []NMES                    []Other:  []w/US   []w/ice   []w/heat  Position:  Location:   []  Traction: [] Cervical       []Lumbar                       [] Prone          []Supine                       []Intermittent   []Continuous Lbs:  [] before manual  [] after manual   []  Ultrasound: []Continuous   [] Pulsed                           []1MHz   []3MHz W/cm2:  Location:   []  Iontophoresis with dexamethasone         Location: [] Take home patch   [] In clinic   [x]  Ice  10    []  heat  []  Ice massage  []  Laser   []  Anodyne Position:seated  Location:right  wrist   []  Laser with stim  []  Other:  Position:  Location:   []  Vasopneumatic Device Pressure:       [] lo [] med [] hi   Temperature: [] lo [] med [] hi   [x] Skin assessment post-treatment:  [x]intact []redness- no adverse reaction    []redness - adverse reaction:      min []Eval                  []Re-Eval       36 min Therapeutic Exercise:  [x] See flow sheet :   Rationale: increase ROM and increase strength to improve the patients ability to perform ADL      min Therapeutic Activity:  []  See flow sheet :   Rationale:   to improve the patients ability to       min Neuromuscular Re-education:  []  See flow sheet :   Rationale:   to improve the patients ability to     10 min Manual Therapy:  Edema  Massage/passive  Stretch  All  plane   Rationale: decrease pain, increase ROM, increase tissue extensibility and decrease edema  to perform ADL      min Gait Training:  ___ feet with ___ device on level surfaces with ___ level of assist   Rationale: With   [x] TE   [] TA   [] neuro   [] other: Patient Education: [x] Review HEP    [] Progressed/Changed HEP based on:   [] positioning   [] body mechanics   [] transfers   [] heat/ice application    [] other:      Other Objective/Functional Measures: min  Edema       Pain Level (0-10 scale) post treatment:0     ASSESSMENT/Changes in Function: Fair  Response  To each there ex. Patient will continue to benefit from skilled PT services to address functional mobility deficits, address ROM deficits, address strength deficits, analyze and address soft tissue restrictions and analyze and cue movement patterns to attain remaining goals. [x]  See Plan of Care  []  See progress note/recertification  []  See Discharge Summary         Progress towards goals / Updated goals:  Short Term Goals: To be accomplished in 2 treatments:  1. Pt will report compliance and independence to HEP to help the pt manage their pain and symptoms. Eval: Established          Current:MET  1/31/19 2/15/19  2/18/19 3/5/19  Long Term Goals: To be accomplished in 24 treatments:  1. Pt will increase FOTO score to 63 points to improve ability to perform ADLs. Eval: 63 points  Current: 49 2/18/19  2.  Pt will increase PROM right wrist flex to 45 degs, EXT to at least 40 degs to improve ability to tolerate dressing activities. Eval: flex 32 degs, EXT 23 degs; both with increased right anterior wrist pain  Current: right wrist flex to 50 degs, EXT  45 2/18/19  3. Pt will increase AROM right wrist flex to at least 30 degs, EXT to at least 20 degs, supination to [facility] to improve ability to tolerate functional tasks. Eval: flex 8 degs, EXT neutral, supination 57 degs; all with increased right anterior wrist pain  Current: AROM wrist F 40 and E 35 supin 70 2/18/19  4.  Pt will report being able to drive and  the steering wheel with the right hand without the brace on her right wrist to improve ease of driving.   Eval: reports using her brace with driving and has trouble gripping  Current: not using brace, able to  wheel ok but ROM limits turning wheel both right and left. 2/18/19  3/11/19        PLAN  []  Upgrade activities as tolerated     []  Continue plan of care  []  Update interventions per flow sheet       []  Discharge due to:_  []  Other:_      Lucille Pelayo PTA 3/14/2019  2:28 PM    Future Appointments   Date Time Provider Manasa Meza   3/14/2019  2:30 PM Stanley Garg PTA [facility]   3/19/2019  9:30 AM Uziel Dooley, MIKA [facility]   3/21/2019 12:30 PM Uziel Dooley PT [facility]   3/26/2019 11:30 AM Uziel Dooley PT [facility]   3/28/2019 12:30 PM Uziel Dooley PT [facility]

## 2019-03-19 ENCOUNTER — HOSPITAL ENCOUNTER (OUTPATIENT)
Dept: PHYSICAL THERAPY | Age: 69
Discharge: HOME | End: 2019-03-19
Payer: MEDICARE

## 2019-03-19 PROCEDURE — 97110 THERAPEUTIC EXERCISES: CPT

## 2019-03-19 PROCEDURE — 97140 MANUAL THERAPY 1/> REGIONS: CPT

## 2019-03-19 PROCEDURE — 97530 THERAPEUTIC ACTIVITIES: CPT

## 2019-03-19 NOTE — PROGRESS NOTES
In [address] [place] [address], [address] [place] [address], [address],[place] [address]  [phone] fax      Continued Plan of Care/ Re-certification for Physical Therapy Services    Referral source: Alma Batista : 1950   Medical/Treatment Diagnosis: Pain in right wrist [M25.531]  Payor: [place] MEDICARE / Plan: [place] MEDICARE PART A & B / Product Type: Medicare /  Onset Date: DOS 2018, initial injury 2018                 Prior Hospitalization: see medical history Provider#: [id number]   Medications: Verified on Patient Summary List     Comorbidities: reports sustaining a chip fracture in the left elbow from the same fall (no surgery was performed)   Prior Level of Function: Independent with ADls and functional tasks with no limitations prior to onset. Pt is right-handed. Visits from Start of Care: 20    Missed Visits: 0    The Plan of Care and following information is based on the patient's current status:  Short Term Goals: To be accomplished in 2 treatments:  1. Pt will report compliance and independence to [facility] to help the pt manage their pain and symptoms. Eval: Established          Current:MET    Long Term Goals: To be accomplished in 24 treatments:  1. Pt will increase FOTO score to 63 points to improve ability to perform ADLs. Eval: 63 points  Current: MET 75  2. Pt will increase PROM right wrist flex to 45 degs, EXT to at least 40 degs to improve ability to tolerate dressing activities. Eval: flex 32 degs, EXT 23 degs; both with increased right anterior wrist pain  Current: MET  right wrist flex to 50 degs, EXT  45 19  3. Pt will increase AROM right wrist flex to at least 30 degs, EXT to at least 20 degs, supination to [facility] to improve ability to tolerate functional tasks. Eval: flex 8 degs, EXT neutral, supination 57 degs; all with increased right anterior wrist pain  Current: MET AROM wrist F 40 and E 35 supin 70 19  4.  Pt will report being able to drive and  the steering wheel with the right hand without the brace on her right wrist to improve ease of driving.   Casey: reports using her brace with driving and has trouble gripping  Current: Progressing not using brace, able to  wheel ok but ROM limits turning wheel both right and left.       Key functional changes: increased AROM/ PROM, increased strength, increased FOTO      Problems/ barriers to goal attainment: none     Problem List: pain affecting function, decrease ROM, decrease strength, decrease ADL/ functional abilitiies, decrease activity tolerance and decrease flexibility/ joint mobility    Treatment Plan: Therapeutic exercise, Therapeutic activities, Neuromuscular re-education, Physical agent/modality, Gait/balance training, Manual therapy, Patient education, Self Care training, Functional mobility training, Home safety training and Stair training     Patient Goal (s) has been updated and includes: get the motion back      Goals for this certification period to be accomplished in 24 treatments:      Frequency / Duration: Patient to be seen 2 times per week for 4 remaining treatments on original POC (24 total)    Assessment / Recommendations: Patient has shown significant improvements with strength activity tolerance and pain control since beginning therapy. She has increased her wrist AROM/PROM and has reported being able to perform more functional activities such as opening jars and carrying groceries. Patient continues to show deficits with swelling control, achieving full AROM and regaining her prior level of strength. Patient is compliant with her HEP and performs all of her exercises with proper form while in therapy. Recommend continuation of therapy at this time to progress with remaining deficits.     Certification Period: 3/19/19 - 4/18/19    Marisela Higuera, PT 3/19/2019 7:51 AM    ________________________________________________________________________  I certify that the above Therapy Services are being furnished while the patient is under my care. I agree with the treatment plan and certify that this therapy is necessary. [] I have read the above and request that my patient continue as recommended.   [] I have read the above report and request that my patient continue therapy with the following changes/special instructions: _____________________________________________  [] I have read the above report and request that my patient be discharged from therapy    Physician's Signature:____________Date:_________TIME:________    ** Signature, Date and Time must be completed for valid certification **    Please sign and return to In [address] [place] [address], [address] [place] [address],[place] [address]  [phone] fax

## 2019-03-19 NOTE — PROGRESS NOTES
PT DAILY TREATMENT NOTE 10-18    Patient Name: Zabrina Fill  Date:3/19/2019  : 1950  [x]  Patient  Verified  Payor: VA MEDICARE / Plan: VA MEDICARE PART A & B / Product Type: Medicare /    In time:9:28  Out time:10:12  Total Treatment Time (min): 44  Visit #: 20 of 24    Medicare/BCBS Only   Total Timed Codes (min):  44 1:1 Treatment Time:  44       Treatment Area: Pain in right wrist [M25.531]    SUBJECTIVE  Pain Level (0-10 scale): 1  Any medication changes, allergies to medications, adverse drug reactions, diagnosis change, or new procedure performed?: [x] No    [] Yes (see summary sheet for update)  Subjective functional status/changes:   [] No changes reported  States that she has been doing fine and is able to open jars/ perform chores with less difficulty     OBJECTIVE          24 min Therapeutic Exercise:  [x] See flow sheet :   Rationale: increase ROM, increase strength and increase proprioception to improve the patients ability to ease with tolerance to ADLs    10 min Therapeutic Activity:  [x]  See flow sheet :   Rationale: increase ROM, increase strength and increase proprioception  to improve the patients ability to perform daily household chores.       10 min Manual Therapy:  PROM with overpressure in all planes, DTM / effleurage for swelling   Rationale: decrease pain, increase ROM, increase tissue extensibility and decrease edema  to assist with household cleaning              With   [] TE   [] TA   [] neuro   [] other: Patient Education: [x] Review HEP    [] Progressed/Changed HEP based on:   [] positioning   [] body mechanics   [] transfers   [] heat/ice application    [] other:      Other Objective/Functional Measures:   able to perform all therex well, minimal changes in symptoms  And with proper form       Pain Level (0-10 scale) post treatment: 0    ASSESSMENT/Changes in Function: Patient continues to show improvements with signs/ symptoms however still demonstrates a decrease in strength, impaired ROM, and an increase in pain with functional activities. Patient will continue to benefit from skilled PT services to modify and progress therapeutic interventions, address functional mobility deficits, address ROM deficits, address strength deficits, analyze and cue movement patterns and analyze and modify body mechanics/ergonomics to attain remaining goals. [x]  See Plan of Care  []  See progress note/recertification  []  See Discharge Summary         The Plan of Care and following information is based on the patient's current status:  Short Term Goals: To be accomplished in 2 treatments:  1. Pt will report compliance and independence to [facility] to help the pt manage their pain and symptoms. Eval: Established          Current:MET    Long Term Goals: To be accomplished in 24 treatments:  1. Pt will increase FOTO score to 63 points to improve ability to perform ADLs. Eval: 63 points  Current: MET 75  2. Pt will increase PROM right wrist flex to 45 degs, EXT to at least 40 degs to improve ability to tolerate dressing activities. Eval: flex 32 degs, EXT 23 degs; both with increased right anterior wrist pain  Current: MET  right wrist flex to 50 degs, EXT  45 2/18/19  3. Pt will increase AROM right wrist flex to at least 30 degs, EXT to at least 20 degs, supination to [facility] to improve ability to tolerate functional tasks. Eval: flex 8 degs, EXT neutral, supination 57 degs; all with increased right anterior wrist pain  Current: MET AROM wrist F 40 and E 35 supin 70 2/18/19  4.  Pt will report being able to drive and  the steering wheel with the right hand without the brace on her right wrist to improve ease of driving.   Eval: reports using her brace with driving and has trouble gripping  Current: Progressing not using brace, able to  wheel ok but ROM limits turning wheel both right and left.         PLAN  [x]  Upgrade activities as tolerated     [x]  Continue plan of care  [] Update interventions per flow sheet       []  Discharge due to:_  []  Other:_      Miguel Angel Bowers, PT 3/19/2019  7:38 AM    Future Appointments   Date Time Provider Manasa Meza   3/19/2019  9:30 AM Mary Torres, PT [facility]   3/21/2019 12:30 PM Mary Torres, PT [facility]   3/26/2019 11:30 AM Mary Torres, PT [facility]   3/28/2019 12:30 PM Mary Torres, PT [facility]   3/29/2019  3:00 PM Foreign Farris NP [facility]

## 2019-03-21 ENCOUNTER — HOSPITAL ENCOUNTER (OUTPATIENT)
Dept: PHYSICAL THERAPY | Age: 69
Discharge: HOME | End: 2019-03-21
Payer: MEDICARE

## 2019-03-21 PROCEDURE — 97140 MANUAL THERAPY 1/> REGIONS: CPT

## 2019-03-21 PROCEDURE — 97530 THERAPEUTIC ACTIVITIES: CPT

## 2019-03-21 PROCEDURE — 97110 THERAPEUTIC EXERCISES: CPT

## 2019-03-21 NOTE — PROGRESS NOTES
PT DAILY TREATMENT NOTE 10-18    Patient Name: Joana Ruiz  Date:3/21/2019  : 1950  [x]  Patient  Verified  Payor: VA MEDICARE / Plan: VA MEDICARE PART A & B / Product Type: Medicare /    In time:12:31  Out time:1:14  Total Treatment Time (min): 43  Visit #: 21 of 24    Medicare/BCBS Only   Total Timed Codes (min):  43 1:1 Treatment Time:  39       Treatment Area: Pain in right wrist [M25.531]    SUBJECTIVE  Pain Level (0-10 scale): 0  Any medication changes, allergies to medications, adverse drug reactions, diagnosis change, or new procedure performed?: [x] No    [] Yes (see summary sheet for update)  Subjective functional status/changes:   [] No changes reported  States that she was able to perform all of her morning hygiene tasks with no increases in pain    OBJECTIVE          23 min Therapeutic Exercise:  [x] See flow sheet :   Rationale: increase ROM, increase strength, improve coordination and increase proprioception to improve the patients ability to perform daily household chores. 10 min Therapeutic Activity:  [x]  See flow sheet :   Rationale: increase strength, improve coordination and increase proprioception  to improve the patients ability to assist with tolerance to ADLs    10 min Manual Therapy:  DTM/effleurage all digits/ and/ wrist, PROM with overpressure stretch flexion, extension, RD, UD, pronation, supination   Rationale: increase ROM and increase tissue extensibility to assist with daily functional activities              With   [] TE   [] TA   [] neuro   [] other: Patient Education: [x] Review HEP    [] Progressed/Changed HEP based on:   [] positioning   [] body mechanics   [] transfers   [] heat/ice application    [] other:      Other Objective/Functional Measures:    Tolerated all therex well , no changes in symptoms      Pain Level (0-10 scale) post treatment: 0     ASSESSMENT/Changes in Function: Patient continues to show improvements with signs/ symptoms however still demonstrates a decrease in strength, impaired ROM, and an increase in pain with functional activities. Patient will continue to benefit from skilled PT services to modify and progress therapeutic interventions, address functional mobility deficits, address strength deficits, analyze and cue movement patterns and analyze and modify body mechanics/ergonomics to attain remaining goals. [x]  See Plan of Care  []  See progress note/recertification  []  See Discharge Summary         Progress towards goals / Updated goals:  Short Term Goals: To be accomplished in 2 treatments:  1. Pt will report compliance and independence to [facility] to help the pt manage their pain and symptoms. Eval: Established          Current:MET    Long Term Goals: To be accomplished in 24 treatments:  1. Pt will increase FOTO score to 63 points to improve ability to perform ADLs. Eval: 63 points  Current: MET 75  2. Pt will increase PROM right wrist flex to 45 degs, EXT to at least 40 degs to improve ability to tolerate dressing activities. Eval: flex 32 degs, EXT 23 degs; both with increased right anterior wrist pain  Current: MET  right wrist flex to 50 degs, EXT  45 2/18/19  3. Pt will increase AROM right wrist flex to at least 30 degs, EXT to at least 20 degs, supination to [facility] to improve ability to tolerate functional tasks. Eval: flex 8 degs, EXT neutral, supination 57 degs; all with increased right anterior wrist pain  Current: MET AROM wrist F 40 and E 35 supin 70 2/18/19  4.  Pt will report being able to drive and  the steering wheel with the right hand without the brace on her right wrist to improve ease of driving.   Eval: reports using her brace with driving and has trouble gripping  Current: Progressing not using brace, able to  wheel ok but ROM limits turning wheel both right and left.            PLAN  [x]  Upgrade activities as tolerated     [x]  Continue plan of care  []  Update interventions per flow sheet       [] Discharge due to:_  []  Other:_      Jaren Crook, PT 3/21/2019  7:48 AM    Future Appointments   Date Time Provider Manasa Susana   3/21/2019 12:30 PM Aura Catalan, PT [facility]   3/26/2019 11:30 AM Aura Catalan PT [facility]   3/28/2019 12:30 PM Aura Catalan PT [facility]   3/29/2019  3:00 PM Sunny Reardon NP [facility]

## 2019-03-26 ENCOUNTER — HOSPITAL ENCOUNTER (OUTPATIENT)
Dept: PHYSICAL THERAPY | Age: 69
Discharge: HOME | End: 2019-03-26
Payer: MEDICARE

## 2019-03-26 PROCEDURE — 97110 THERAPEUTIC EXERCISES: CPT

## 2019-03-26 PROCEDURE — 97140 MANUAL THERAPY 1/> REGIONS: CPT

## 2019-03-26 NOTE — PROGRESS NOTES
PT DAILY TREATMENT NOTE 10-18    Patient Name: Len Nova  Date:3/26/2019  : 1950  [x]  Patient  Verified  Payor: VA MEDICARE / Plan: VA MEDICARE PART A & B / Product Type: Medicare /    In time:11:28  Out time:12:02  Total Treatment Time (min): 34  Visit #: 22 of 24    Medicare/BCBS Only   Total Timed Codes (min):  34 1:1 Treatment Time:  30       Treatment Area: Pain in right wrist [M25.531]    SUBJECTIVE  Pain Level (0-10 scale): 0  Any medication changes, allergies to medications, adverse drug reactions, diagnosis change, or new procedure performed?: [x] No    [] Yes (see summary sheet for update)  Subjective functional status/changes:   [] No changes reported  Feels really good, not having many problems with is, I can turn on my shower and open jars again     OBJECTIVE      14 min Therapeutic Exercise:  [x] See flow sheet :   Rationale: increase ROM, increase strength and increase proprioception to improve the patients ability to perform daily household chores      10 min Therapeutic Activity:  [x]  See flow sheet :   Rationale: increase strength, improve coordination and increase proprioception  to improve the patients ability to assist with tolerance to ADLs     10 min Manual Therapy:  PROM with overpressure in all planes, DTM/ effleurage to reduce swelling to wrist an all digits    Rationale: decrease pain, increase ROM and increase tissue extensibility to assist with performing personal hygiene tasks such as brushing hair/ teeth      With   [] TE   [] TA   [] neuro   [] other: Patient Education: [x] Review HEP    [] Progressed/Changed HEP based on:   [] positioning   [] body mechanics   [] transfers   [] heat/ice application    [] other:      Other Objective/Functional Measures:   Able to perform all exercises well- no cueing required      Pain Level (0-10 scale) post treatment: 0    ASSESSMENT/Changes in Function: Patient continues to show improvements with signs/ symptoms however still demonstrates a decrease in strength, impaired ROM, and an increase in pain with functional activities. Patient will continue to benefit from skilled PT services to modify and progress therapeutic interventions, address functional mobility deficits, address ROM deficits, address strength deficits, analyze and cue movement patterns and analyze and modify body mechanics/ergonomics to attain remaining goals. [x]  See Plan of Care  []  See progress note/recertification  []  See Discharge Summary         Progress towards goals / Updated goals:  Short Term Goals: To be accomplished in 2 treatments:  1. Pt will report compliance and independence to [facility] to help the pt manage their pain and symptoms. Eval: Established          Current:MET    Long Term Goals: To be accomplished in 24 treatments:  1. Pt will increase FOTO score to 63 points to improve ability to perform ADLs. Eval: 63 points  Current: MET 75  2. Pt will increase PROM right wrist flex to 45 degs, EXT to at least 40 degs to improve ability to tolerate dressing activities. Eval: flex 32 degs, EXT 23 degs; both with increased right anterior wrist pain  Current: MET  right wrist flex to 50 degs, EXT  45 2/18/19  3. Pt will increase AROM right wrist flex to at least 30 degs, EXT to at least 20 degs, supination to [facility] to improve ability to tolerate functional tasks. Eval: flex 8 degs, EXT neutral, supination 57 degs; all with increased right anterior wrist pain  Current: MET AROM wrist F 40 and E 35 supin 70 2/18/19  4.  Pt will report being able to drive and  the steering wheel with the right hand without the brace on her right wrist to improve ease of driving.   Eval: reports using her brace with driving and has trouble gripping  Current: Progressing not using brace, able to  wheel ok but ROM limits turning wheel both right and left.         PLAN  [x]  Upgrade activities as tolerated     [x]  Continue plan of care  []  Update interventions per flow sheet       []  Discharge due to:_  []  Other:_      Mackenzie Allen, PT 3/26/2019  7:43 AM    Future Appointments   Date Time Provider Manasa Susana   3/26/2019 11:30 AM Cuauhtemoc Fuentes, PT [facility]   3/28/2019 12:30 PM [facility] PT [place]  1 [facility]   3/29/2019  3:00 PM Yesika Man, NP [facility]

## 2019-03-28 ENCOUNTER — APPOINTMENT (OUTPATIENT)
Dept: PHYSICAL THERAPY | Age: 69
End: 2019-03-28
Payer: MEDICARE

## 2019-03-29 ENCOUNTER — OFFICE VISIT (OUTPATIENT)
Dept: INTERNAL MEDICINE CLINIC | Age: 69
End: 2019-03-29

## 2019-03-29 VITALS
HEART RATE: 83 BPM | OXYGEN SATURATION: 97 % | TEMPERATURE: 98.2 F | HEIGHT: 60 IN | SYSTOLIC BLOOD PRESSURE: 117 MMHG | DIASTOLIC BLOOD PRESSURE: 69 MMHG | WEIGHT: 124 LBS | RESPIRATION RATE: 16 BRPM | BODY MASS INDEX: 24.35 KG/M2

## 2019-03-29 DIAGNOSIS — Z00.00 ROUTINE GENERAL MEDICAL EXAMINATION AT A HEALTH CARE FACILITY: ICD-10-CM

## 2019-03-29 DIAGNOSIS — F41.8 SITUATIONAL ANXIETY: Primary | ICD-10-CM

## 2019-03-29 DIAGNOSIS — K64.9 HEMORRHOIDS, UNSPECIFIED HEMORRHOID TYPE: ICD-10-CM

## 2019-03-29 DIAGNOSIS — Z11.59 NEED FOR HEPATITIS C SCREENING TEST: ICD-10-CM

## 2019-03-29 RX ORDER — METRONIDAZOLE 500 MG/1
TABLET ORAL
Refills: 0 | COMMUNITY
Start: 2019-03-20 | End: 2019-05-18 | Stop reason: SDUPTHER

## 2019-03-29 RX ORDER — CIPROFLOXACIN 500 MG/1
TABLET ORAL
Refills: 0 | COMMUNITY
Start: 2019-03-20 | End: 2019-05-18 | Stop reason: SDUPTHER

## 2019-03-29 RX ORDER — HYDROCORTISONE 25 MG/G
CREAM TOPICAL 4 TIMES DAILY
Qty: 30 G | Refills: 0 | Status: SHIPPED | OUTPATIENT
Start: 2019-03-29 | End: 2019-08-28 | Stop reason: SDUPTHER

## 2019-03-29 RX ORDER — ALPRAZOLAM 0.25 MG/1
0.25 TABLET ORAL
Qty: 5 TAB | Refills: 0 | Status: SHIPPED | OUTPATIENT
Start: 2019-03-29 | End: 2020-10-02

## 2019-03-29 NOTE — PROGRESS NOTES
Navin Valdez is a 79 y.o.  female and presents with Chief Complaint Patient presents with  New Patient  
  establishing care today  Hemorrhoids Subjective: HPI Mrs. Yael Russo presents today to establish care. Mrs. Yael Russo has a history of diverticulitis and chronic hemorrhoids with history of hemorrhoidectomy, environmental allergies, situational depression,  
 
Mrs. Yael Russo has a history of diverticulitis and chronic hemorrhoids with history of hemmorrhoidectomy followed by GI, Dr. Juan Ferris and Margie Cunningham. She was admitted November 12, 2018 and then second episode occurred about 10 days ago. She is finishing a second course of Ciprofloxacin and Flagyl. She reports felling improved, still with minor discomfort, having both diarrhea and constipation, and hemorrhoids. She reports in the past used sitz bath and creams with relief? She is using Eric. Last colonoscopy was 2/2019 was good with 10 year follow up. +[facility] father colon cancer? She reports with chronic stool incontinence, negative for anal fissure, she is wearing pads, can change the pad up to 10 times a day intermittently. Occurring x 3 years. She reports has always fought constipation and diarrhea, possibly IBS. Mrs. Yael Russo has history of situational depression. She was taking Effexor and she reports weaned off. Wears glasses and reports Dr. Graciela Diaz reports with cataracts. Additional Concerns: none ROS Review of Systems Eyes: Positive for blurred vision. Respiratory: Negative. Cardiovascular: Negative. Gastrointestinal: Positive for constipation and diarrhea. Genitourinary: Negative. Musculoskeletal: Negative. Neurological: Negative. Endo/Heme/Allergies: Positive for environmental allergies. Psychiatric/Behavioral: Negative. All other systems reviewed and are negative. Allergies Allergen Reactions  Septra [Sulfamethoprim Ds] Rash Current Outpatient Medications Medication Sig Dispense Refill  ciprofloxacin HCl (CIPRO) 500 mg tablet TK 1 T PO BID FOR 10 DAYS  0  
 metroNIDAZOLE (FLAGYL) 500 mg tablet TK 1 T PO BID FOR 10 DAYS  0  
 ALPRAZolam (XANAX) 0.25 mg tablet Take 1 Tab by mouth daily as needed for Anxiety. 5 Tab 0  
 hydrocortisone (ANUSOL-HC) 2.5 % rectal cream Insert  into rectum four (4) times daily. 30 g 0  
 ondansetron (ZOFRAN ODT) 4 mg disintegrating tablet Take 1 Tab by mouth every eight (8) hours as needed for Nausea. 14 Tab 0  
 cetirizine (ZYRTEC) 10 mg tablet Take 1 Tab by mouth daily. (Patient taking differently: Take 10 mg by mouth daily as needed.) 30 Tab 3  
 venlafaxine (EFFEXOR) 25 mg tablet Take 0.5 Tabs by mouth two (2) times a day. 30 Tab 12  
 triamcinolone acetonide (KENALOG) 0.1 % topical cream Apply  to affected area two (2) times a day. use thin layer for up to 2 weeks. 15 g 1 Social History Socioeconomic History  Marital status:  Spouse name: Not on file  Number of children: Not on file  Years of education: Not on file  Highest education level: Not on file Occupational History  Not on file Social Needs  Financial resource strain: Not on file  Food insecurity:  
  Worry: Not on file Inability: Not on file  Transportation needs:  
  Medical: Not on file Non-medical: Not on file Tobacco Use  Smoking status: Never Smoker  Smokeless tobacco: Never Used Substance and Sexual Activity  Alcohol use: Yes Comment: occassionally  Drug use: No  
 Sexual activity: Not on file Lifestyle  Physical activity:  
  Days per week: Not on file Minutes per session: Not on file  Stress: Not on file Relationships  Social connections:  
  Talks on phone: Not on file Gets together: Not on file Attends Shinto service: Not on file Active member of club or organization: Not on file Attends meetings of clubs or organizations: Not on file Relationship status: Not on file  Intimate partner violence:  
  Fear of current or ex partner: Not on file Emotionally abused: Not on file Physically abused: Not on file Forced sexual activity: Not on file Other Topics Concern  Not on file Social History Narrative  Not on file Past Medical History:  
Diagnosis Date  Depression  Diverticulitis Past Surgical History:  
Procedure Laterality Date  HX COLONOSCOPY  2007; 2/2019  
 neg; 2nd colo showed diverticula  HX OTHER SURGICAL Right arm Family History Problem Relation Age of Onset  Hypertension Father  Heart Disease Father  No Known Problems Mother  Heart Attack Brother   
     age 51  
 Heart Attack Paternal Grandfather   
     age 76  
 Heart Attack Brother   
     age 62 Objective: 
Vitals:  
 03/29/19 1450 BP: 117/69 Pulse: 83 Resp: 16 Temp: 98.2 °F (36.8 °C) TempSrc: Oral  
SpO2: 97% Weight: 124 lb (56.2 kg) Height: 5' (1.524 m) PainSc:   2 PainLoc: Buttocks LABS Results for orders placed or performed during the hospital encounter of 11/12/18 URINALYSIS W/ RFLX MICROSCOPIC Result Value Ref Range Color ORANGE Appearance CLOUDY Specific gravity 1.015 1.005 - 1.030    
 pH (UA) 5.5 5.0 - 8.0 Protein 100 (A) NEG mg/dL Glucose NEGATIVE  NEG mg/dL Ketone 15 (A) NEG mg/dL Bilirubin NEGATIVE  NEG Blood LARGE (A) NEG Urobilinogen 1.0 0.2 - 1.0 EU/dL Nitrites NEGATIVE  NEG Leukocyte Esterase TRACE (A) NEG    
CBC WITH AUTOMATED DIFF Result Value Ref Range WBC 12.7 4.6 - 13.2 K/uL  
 RBC 4.03 (L) 4.20 - 5.30 M/uL  
 HGB 12.5 12.0 - 16.0 g/dL HCT 36.3 35.0 - 45.0 % MCV 90.1 74.0 - 97.0 FL  
 MCH 31.0 24.0 - 34.0 PG  
 MCHC 34.4 31.0 - 37.0 g/dL  
 RDW 11.5 (L) 11.6 - 14.5 % PLATELET 811 025 - 928 K/uL MPV 8.9 (L) 9.2 - 11.8 FL  
 NEUTROPHILS 78 (H) 40 - 73 % LYMPHOCYTES 11 (L) 21 - 52 % MONOCYTES 11 (H) 3 - 10 % EOSINOPHILS 0 0 - 5 % BASOPHILS 0 0 - 2 %  
 ABS. NEUTROPHILS 9.8 (H) 1.8 - 8.0 K/UL  
 ABS. LYMPHOCYTES 1.4 0.9 - 3.6 K/UL  
 ABS. MONOCYTES 1.4 (H) 0.05 - 1.2 K/UL  
 ABS. EOSINOPHILS 0.1 0.0 - 0.4 K/UL  
 ABS. BASOPHILS 0.1 0.0 - 0.1 K/UL  
 DF AUTOMATED METABOLIC PANEL, BASIC Result Value Ref Range Sodium 134 (L) 136 - 145 mmol/L Potassium 3.7 3.5 - 5.5 mmol/L Chloride 98 (L) 100 - 108 mmol/L  
 CO2 24 21 - 32 mmol/L Anion gap 12 3.0 - 18 mmol/L Glucose 109 (H) 74 - 99 mg/dL BUN 14 7.0 - 18 MG/DL Creatinine 0.96 0.6 - 1.3 MG/DL  
 BUN/Creatinine ratio 15 12 - 20 GFR est AA >60 >60 ml/min/1.73m2 GFR est non-AA 58 (L) >60 ml/min/1.73m2 Calcium 8.7 8.5 - 10.1 MG/DL  
LIPASE Result Value Ref Range Lipase 161 73 - 393 U/L  
HEPATIC FUNCTION PANEL Result Value Ref Range Protein, total 7.9 6.4 - 8.2 g/dL Albumin 2.9 (L) 3.4 - 5.0 g/dL Globulin 5.0 (H) 2.0 - 4.0 g/dL A-G Ratio 0.6 (L) 0.8 - 1.7 Bilirubin, total 0.4 0.2 - 1.0 MG/DL Bilirubin, direct 0.1 0.0 - 0.2 MG/DL Alk. phosphatase 89 45 - 117 U/L  
 AST (SGOT) 25 15 - 37 U/L  
 ALT (SGPT) 19 13 - 56 U/L  
CARDIAC PANEL,(CK, CKMB & TROPONIN) Result Value Ref Range CK 30 26 - 192 U/L  
 CK - MB <1.0 <3.6 ng/ml CK-MB Index  0.0 - 4.0 % CALCULATION NOT PERFORMED WHEN RESULT IS BELOW LINEAR LIMIT Troponin-I, QT <0.02 0.00 - 0.06 NG/ML  
URINE MICROSCOPIC ONLY Result Value Ref Range WBC 0 to 3 0 - 4 /hpf  
 RBC TOO NUMEROUS TO COUNT 0 - 5 /hpf Epithelial cells FEW 0 - 5 /lpf Bacteria FEW (A) NEG /hpf Mucus FEW (A) NEG /lpf POC LACTIC ACID Result Value Ref Range Lactic Acid (POC) 1.25 0.40 - 2.00 mmol/L  
EKG, 12 LEAD, INITIAL Result Value Ref Range Ventricular Rate 89 BPM  
 Atrial Rate 89 BPM  
 P-R Interval 150 ms QRS Duration 66 ms  
 Q-T Interval 350 ms QTC Calculation (Bezet) 425 ms Calculated P Axis 47 degrees Calculated R Axis 15 degrees Calculated T Axis 22 degrees Diagnosis Normal sinus rhythm Nonspecific ST and T wave abnormality Abnormal ECG No previous ECGs available Confirmed by Salvador Toro MD, Joseph Kaurutsasha ([id number]) on 11/13/2018 4:51:35 PM 
  
 
 
TESTS 
none PE Physical Exam  
Constitutional: She is oriented to person, place, and time. She appears well-developed and well-nourished. No distress. HENT:  
Head: Normocephalic and atraumatic. Cardiovascular: Normal rate, regular rhythm, normal heart sounds and intact distal pulses. Pulmonary/Chest: Effort normal and breath sounds normal. No respiratory distress. She has no wheezes. She has no rales. She exhibits no tenderness. Musculoskeletal: Normal range of motion. Neurological: She is alert and oriented to person, place, and time. Skin: Skin is warm and dry. She is not diaphoretic. Psychiatric: She has a normal mood and affect. Her behavior is normal. Judgment and thought content normal.  
Vitals reviewed. Assessment/Plan: 1. Establish care- CPE and MWV with labs due in the next 6 months. She reports does have living will complete and will bring in next visit. 2. Hemorrhoids-  Anusol cream, Tucks pads, hydration as she is with possible mixed IBS-D&C 3. She is traveling at the end of April and reports with anxiety with travel now, not experienced in the past. Discussed s/e.  
 
4. She is due for [address] and CPE, schedule within 6 months. Physical labs ordered. Lab review: labs are reviewed Today's Visit:  
Diagnoses and all orders for this visit: 1. Situational anxiety -     ALPRAZolam (XANAX) 0.25 mg tablet; Take 1 Tab by mouth daily as needed for Anxiety. 2. Hemorrhoids, unspecified hemorrhoid type 
-     hydrocortisone (ANUSOL-HC) 2.5 % rectal cream; Insert  into rectum four (4) times daily. 3. Routine general medical examination at a health care facility 
-     CBC WITH AUTOMATED DIFF; Future -     METABOLIC PANEL, COMPREHENSIVE; Future 
-     HEMOGLOBIN A1C WITH EAG; Future -     LIPID PANEL; Future 
-     TSH 3RD GENERATION; Future -     URINALYSIS W/ RFLX MICROSCOPIC; Future 4. Need for hepatitis C screening test 
-     HEPATITIS C AB; Future Health Maintenance: CPE and MWV in future. I have discussed the diagnosis with the patient and the intended plan as seen in the above orders. The patient has received an after-visit summary and questions were answered concerning future plans. I have discussed medication side effects and warnings with the patient as well. I have reviewed the plan of care with the patient, accepted their input and they are in agreement with the treatment goals. Follow-up and Dispositions · Return in about 6 months (around 9/29/2019), or if symptoms worsen or fail to improve, for cpe and MWV, labs prior. More than 1/2 of this 30 minute visit was spent in counseling and coordination of care, as described above. SUHAIL RiddleC Internist of [facility] [address] [place], [address] Phone: [phone] Fax: [phone]

## 2019-03-29 NOTE — PROGRESS NOTES
Leo Ramos presents today for Chief Complaint Patient presents with  New Patient Depression Screening: 
3 most recent PHQ Screens 3/29/2019 Little interest or pleasure in doing things Not at all Feeling down, depressed, irritable, or hopeless Not at all Total Score PHQ 2 0 Learning Assessment: 
Learning Assessment 7/1/2014 PRIMARY LEARNER Patient HIGHEST LEVEL OF EDUCATION - PRIMARY LEARNER  4 YEARS OF COLLEGE  
BARRIERS PRIMARY LEARNER NONE  
CO-LEARNER CAREGIVER No  
PRIMARY LANGUAGE ENGLISH  NEED No  
LEARNER PREFERENCE PRIMARY DEMONSTRATION  
LEARNING SPECIAL TOPICS none ANSWERED BY self Abuse Screening: 
Abuse Screening Questionnaire 7/22/2016 Do you ever feel afraid of your partner? Clearnce Beams Are you in a relationship with someone who physically or mentally threatens you? Clearnce Beams Is it safe for you to go home? Bj Singer Fall Risk Fall Risk Assessment, last 12 mths 3/29/2019 Able to walk? Yes Fall in past 12 months? Yes Fall with injury? Yes  
Number of falls in past 12 months 1 Fall Risk Score 2 Coordination of Care: 1. Have you been to the ER, urgent care clinic since your last visit? Hospitalized since your last visit? New patient 2. Have you seen or consulted any other health care providers outside of the [address] [place] [address] since your last visit? Include any pap smears or colon screening. yes

## 2019-04-12 ENCOUNTER — HOSPITAL ENCOUNTER (OUTPATIENT)
Dept: CT IMAGING | Age: 69
Discharge: HOME | End: 2019-04-12
Attending: NURSE PRACTITIONER
Payer: MEDICARE

## 2019-04-12 ENCOUNTER — HOSPITAL ENCOUNTER (OUTPATIENT)
Dept: PHYSICAL THERAPY | Age: 69
Discharge: HOME | End: 2019-04-12
Payer: MEDICARE

## 2019-04-12 DIAGNOSIS — K57.01 DIVERTICULITIS OF SMALL INTESTINE WITH PERFORATION AND ABSCESS WITH BLEEDING: ICD-10-CM

## 2019-04-12 LAB — CREAT UR-MCNC: 0.7 MG/DL (ref 0.6–1.3)

## 2019-04-12 PROCEDURE — 74160 CT ABDOMEN W/CONTRAST: CPT

## 2019-04-12 PROCEDURE — 74011636320 HC RX REV CODE- 636/320: Performed by: NURSE PRACTITIONER

## 2019-04-12 PROCEDURE — 97530 THERAPEUTIC ACTIVITIES: CPT

## 2019-04-12 PROCEDURE — 82565 ASSAY OF CREATININE: CPT

## 2019-04-12 RX ADMIN — IOPAMIDOL 80 ML: 612 INJECTION, SOLUTION INTRAVENOUS at 18:53

## 2019-04-12 NOTE — PROGRESS NOTES
PT DISCHARGE DAILY NOTE AND [id number]    Date:2019  Referral source: John Markham PA : [id number]   Medical/Treatment Diagnosis: Pain in right wrist [M25.531]  Payor: [place] MED[place]ARE / Plan: Harper Loomis / Product Type: Medicare /  Onset Date: DOS 2018, initial injury 2018                 Prior Hospitalization: see medical history Provider#: [id number]   Medications: Verified on Patient Summary List     Comorbidities: reports sustaining a chip fracture in the left elbow from the same fall (no surgery was performed)   Prior Level of Function: Independent with ADls and functional tasks with no limitations prior to onset. Pt is right-handed. Visits from Start of Care: 23    Missed Visits: 0  Reporting Period : 2019 to 2019    Date:2019  : 1950  [x]  Patient  Verified  Payor: Angela Jacques / Plan: [place] MEDICARE PART A & B / Product Type: Medicare /    In time: 3:40  Out time:4:18  Total Treatment Time (min): 38  Visit #: 23 of 24    Medicare/BCBS Only   Total Timed Codes (min):  38 1:1 Treatment Time:  30       SUBJECTIVE  Pain Level (0-10 scale): 0  Any medication changes, allergies to medications, adverse drug reactions, diagnosis change, or new procedure performed?: [x] No    [] Yes (see summary sheet for update)  Subjective functional status/changes:   [] No changes reported  Pt reports that her wrist is doing good but has trouble gripping and performing fine motor tasks with her right hand.     OBJECTIVE    38 min Therapeutic Activity:  []  See flow sheet : goal reassessment, HEP instructions, d/c instructions    Rationale: increase ROM and increase strength  to improve the patients ability to tolerate ADLs         With   [] TE   [] TA   [] neuro   [] other: Patient Education: [x] Review HEP    [] Progressed/Changed HEP based on:   [] positioning   [] body mechanics   [] transfers   [] heat/ice application    [] other:      Other Objective/Functional Measures: See goals below.  strength (3rd rung): 19 pounds on the right (average of 3 trials); 27.5 pounds on the left (average of 3 trials). Pt is right handed.       Functional Deficits: gripping toothbrush to brush teeth, picking up and lifting a heavier  Cup, pain with gripping/making a fist  Pain         Best: 0/10                Worst: 4/10 when making a fist     Pain Level (0-10 scale) post treatment: 0    Summary of Care:  Goal: Pt will report compliance and independence to [facility] to help the pt manage their pain and symptoms. Status at last note/certification: MET  Status at discharge: met    Goal: Pt will increase FOTO score to 63 points to improve ability to perform ADLs. Status at last note/certification: MET, 74 points  Status at discharge: met    Goal: Pt will increase PROM right wrist flex to 45 degs, EXT to at least 40 degs to improve ability to tolerate dressing activities. Status at last note/certification: MET  right wrist flex to 50 degs, EXT  45   Status at discharge: met    Goal: Pt will increase AROM right wrist flex to at least 30 degs, EXT to at least 20 degs, supination to [facility] to improve ability to tolerate functional tasks. Status at last note/certification: MET AROM wrist F 40 and E 35 supin 70   Status at discharge: met    Goal: Pt will report being able to drive and  the steering wheel with the right hand without the brace on her right wrist to improve ease of driving.   Status at last note/certification: Progressing has trouble gripping and repetitively steering her steering wheel but this is improving  Status at discharge: not met    ASSESSMENT/Changes in Function:   Pt was seen for 23 therapy sessions. Pt demonstrated/reported improvements in wrist ROM, overall mobility, strength, and gripping since starting therapy. Pt continues to report having swelling in the right hand.  She also reports having trouble making a fist secondary to pain and limited ROM, which limits her fine motor function. Pt educated to continue performing HEP exercises daily to improve her motion and strength. Discussed with pt d/c'ing from physical therapy and transition to occupational therapy secondary to limitations in fine motor movements/strength and pt agrees. Pt is d/c'ed from physical therapy at this time to HEP secondary to improvement in wrist ROM/mobility and to transition to occupational therapy.      Thank you for this referral!      PLAN  [x]Discontinue therapy: [x]Patient has reached or is progressing toward set goals      []Patient is non-compliant or has abdicated      []Due to lack of appreciable progress towards set goals    Felipe Gabriel, PT 4/12/2019  4:27 PM

## 2019-04-18 ENCOUNTER — HOSPITAL ENCOUNTER (OUTPATIENT)
Dept: PHYSICAL THERAPY | Age: 69
Discharge: HOME | End: 2019-04-18
Payer: MEDICARE

## 2019-04-18 PROCEDURE — 97110 THERAPEUTIC EXERCISES: CPT

## 2019-04-18 PROCEDURE — 97165 OT EVAL LOW COMPLEX 30 MIN: CPT

## 2019-04-18 PROCEDURE — 97018 PARAFFIN BATH THERAPY: CPT

## 2019-04-18 NOTE — PROGRESS NOTES
OT DAILY TREATMENT NOTE 10-18    Patient Name: Sawyer Fees  Date:2019  : 1950  [x]  Patient  Verified  Payor: [place] MEDICARE / Plan: [place] MEDICARE PART A & B / Product Type: Medicare /    In time:340  Out time:430  Total Treatment Time (min): 50  Visit #: 1 of     Medicare/BCBS Only   Total Timed Codes (min):  25 1:1 Treatment Time:  50     Treatment Area: Pain in right wrist [M25.531]    SUBJECTIVE  Pain Level (0-10 scale): 2/10  Any medication changes, allergies to medications, adverse drug reactions, diagnosis change, or new procedure performed?: [x] No    [] Yes (see summary sheet for update)  Subjective functional status/changes:   [] No changes reported  They are still swollen    OBJECTIVE    Modality rationale: decrease pain and increase tissue extensibility to improve the patients ability to grasp   Min Type Additional Details    [] Estim:  []Unatt       []IFC  []Premod                        []Other:  []w/ice   []w/heat  Position:  Location:    [] Estim: []Att    []TENS instruct  []NMES                    []Other:  []w/US   []w/ice   []w/heat  Position:  Location:    []  Traction: [] Cervical       []Lumbar                       [] Prone          []Supine                       []Intermittent   []Continuous Lbs:  [] before manual  [] after manual    []  Ultrasound: []Continuous   [] Pulsed                           []1MHz   []3MHz W/cm2:  Location:    []  Iontophoresis with dexamethasone         Location: [] Take home patch   [] In clinic    []  Ice     []  heat  []  Ice massage  []  Laser   []  Anodyne Position:  Location:     10 []  Laser with stim  [x]  Other:Praffin  Position:  Location:right hand    []  Vasopneumatic Device Pressure:       [] lo [] med [] hi   Temperature: [] lo [] med [] hi       [x] Skin assessment post-treatment:  [x]intact []redness- no adverse reaction    []redness - adverse reaction:     25 min [x]Eval                  []Re-Eval       10 min Therapeutic Exercise:  [] See flow sheet :   Rationale: increase ROM to improve the patients ability to grasp  Tendon glides x 6 ( tolerated) right  Towel scrunch x 10 right hand         5 min Manual Therapy:  Edema management   Rationale: decrease pain, increase ROM and decrease edema  to grasp  Edema massage to digits reviewed and completed  tubigrip sleeves for digits issued    With   [x] TE   [] TA   [] neuro   [x] other: Patient Education: [x] Review HEP    [] Progressed/Changed HEP based on: towel scrunch, tendon glides  [] positioning   [] body mechanics   [] transfers   [] heat/ice application   [] Splint wear/care   [] Sensory re-education   [] scar management      [x] other: edema control           Other Objective/Functional Measures:   Subjective: pt is a right hand dominant, 79 y.o.y/o, female who sustained his/her injury in fall down stairs in December, received ORIF one week later. Had outpatient physical therapy from January until April . Referred due to ongoing fine motor issues  Prior level of function: Retired, likes to travel, volunteer court, volunteer various agencies, computer solitaire  Pain level:(0-no pain 10-debilitating pain) mild    Description/Location: right wrist with c/o intermittent relief   Worst pain8/10 Least pain 1/10   Activities which aggravate pain: *fist   Activities which ease pain: nothing  Current functional limitations/living situation: Alone, opening jars, bottles,     Medical hx: diverticulitis, right elbow chip fracture    Medications: See the written copy of this report in the patient's paper medical record.        Objective:  Edema: Circumference    Right  Left   PIP2 **  6.2*cm  **5.8*cm   PIP3   6.1cm  5.7cm   PIP4   6.0cm  5.3cm   PIP5   5.0  4.5  Scar/Wound: size, color, drainage, adhesions, location:       Range of Motion:     Active Passive     Norms Right Left Right Left   Shoulder Flex 0-180        Ext 0-60        abd 0-180        Horizontal add 0-45        IR 0-70 ER 0-90       Elbow Ext/flex 0-150       Forearm Supination 0-80        Pronation 0-80       Wrist Flex 0-80 63 72      Ext 0-70 68 70      Ulnar Dev 0-30 15 32      Radial Dev 0-20 20 30         Hand ROM R/L   Index Middle Ring Small Thumb    MP 90/85 90/95 80/90 90/90  CMC   PIP 80/-115 90/110 90/110 80/105  MP   DIP 65/65 60/70 60/65 60/65  IP         Patel Abd         Radial Abd     Hand Strength:   Gross Grasp 3pt Pinch Lateral Pinch Tip Pinch   Right  15 7 10 6   Left 30 10 10 6     Nine-Hole Peg Test:  Left= __19___seconds  Right=_17____seconds  [place]  Left 89   Right 76  Finger Opposition:WNL          ADLs  Feeding:        []MaxA   []ModA   []Jacqueline   [] CGA   []SBA   [x]Louise   []Independent  UE Dressing:       []MaxA   []ModA   []Jacqueline   [] CGA   []SBA   []Louise   [x]Independent  LE Dressing:       []MaxA   []ModA   []Jacqueline   [] CGA   []SBA   []Louise   [x]Independent  Grooming:       []MaxA   []ModA   []Jacqueline   [] CGA   []SBA   [x]Louise   []Independent  Toileting:       []MaxA   []ModA   []Jacqueline   [] CGA   []SBA   []Luoise   [x]Independent  Bathing:       []MaxA   []ModA   []Jacqueline   [] CGA   []SBA   []Louise   [x]Independent  Light Meal Prep:    []MaxA   []ModA   [x]Jacqueline   [] CGA   []SBA   []Louise   []Independent  Household/Other: []MaxA   []ModA   []Jacqueline   [] CGA   []SBA   []Louise   [x]Independent  Adaptive Equip:     []MaxA   []ModA   []Jacqueline   [] CGA   []SBA   []Louise   []Independent  Driving:       []MaxA   []ModA   []Jacqueline   [] CGA   []SBA   []Louise   [x]Independent  Money Mgmt:        []MaxA   []ModA   []Jacqueline   [] CGA   []SBA   []Louise   []Independent       Pain Level (0-10 scale) post treatment: 1/10    ASSESSMENT/Changes in Function:      [x]  See Plan of Care  []  See progress note/recertification  []  See Discharge Summary           PLAN  []  Upgrade activities as tolerated     []  Continue plan of care  []  Update interventions per flow sheet       []  Discharge due to:_  []  Other:_      Alina Calles, OTR/L 4/18/2019  3:22 PM    Future Appointments   Date Time Provider Manasa Kasperi   4/18/2019  3:30 PM Galen Akbar, OTR/L [id number]

## 2019-04-18 NOTE — PROGRESS NOTES
[facility] - [facility] [employer]  [address] [facility]  [phone] fax    Plan of Care/Statement of Necessity for Occupational Therapy Services    Patient name: Leigha Haynes Start of Care: 2019   Referral source: Katrina Dai, * : 1950    Medical Diagnosis: Pain in right hand [M79.641]  Payor: [place] MEDICARE / Plan: [place] MEDICARE PART A & B / Product Type: Medicare /  Onset Date:2018    Treatment Diagnosis: pain right hand   Prior Hospitalization: see medical history Provider#: [id number]   Medications: Verified on Patient summary List    Comorbidities: Diverticulitis   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 Assessment/ key information: 79year old RHD female who fell down steps in 2018 and fractured right wrist requiring ORIF. She has been seen for wrist ROM and strengthening by physical therapist, but has ongoing weakness and digit ROM loss for which she is now being referred for occupational therapy. Her pain is 2/10 in hand. She reports difficulty with making a fist, opening jars and bottles, using hand for oral care and eating, cutting etc.  Her digit ROM is limited relative to left hand with inability to achieve tight fist or touch palm with digits well. Digit extension is WNL. Digits are mildly edematous which affects ROM.  is 15# (left 30), with pinches of 3 point 7 ( left 10), lateral   Both 10, and tip both 6. Her FOTO is 69/100 reflecting slight impairment in function.       Evaluation Complexity: History LOW Complexity : Brief history review  Examination LOW Complexity : 1-3 performance deficits relating to physical, cognitive , or psychosocial skils that result in activity limitations and / or participation restrictions  Clinical Decision Making LOW Complexity : No comorbidities that affect functional and no verbal or physical assistance needed to complete eval tasks   Overall Complexity Rating: LOW     Problem List: Pain effecting function, Decreased range of motion, Decreased strength, Edema effecting function, Decreased ADL/functional abilities  and Decreased flexibility/joint mobility     Treatment Plan may include any combination of the following: Therapeutic exercise, Therapeutic activities, Physical agent/modality, Manual therapy, Patient education and ADLs/IADLs    Patient / Family readiness to learn indicated by: asking questions, trying to perform skills and interest    Persons(s) to be included in education:   patient (P)    Barriers to Learning/Limitations: None    Patient Goal (s): Make a fist, have full     Patient Self Reported Health Status: good    Rehabilitation Potential: excellent    Short Term Goals: To be accomplished in 2 weeks:  1. Patient will be independent in home exercise program for digit ROM to improve grasp. 2.  Patient to be independent in edema management strategies. 3.  Patient will be able to open jars and bottles with task modifications. Long Term Goals: To be accomplished in 8 weeks:   1. Patient will increase  strength in right hand by 20 pounds to increase ease of opening jars  2. Patient will be able to close hand tightly to secure coins and small items in palm. 3..  Patient will increase all pinches at least 2-3# for opening packages.       Frequency / Duration: Patient to be seen 2-3 times per week for 8 weeks:    Patient/ Caregiver education and instruction: Diagnosis, prognosis, self care, activity modification and exercises  [x]  Plan of care has been reviewed with BINGHAM    Certification Period: 4/18/19-6/16/19    TEE Alcazar/L 4/18/2019 5:49 PM      ________________________________________________________________________    I certify that the above Therapy Services are being furnished while the patient is under my care. I agree with the treatment plan and certify that this therapy is necessary.     Physician's Signature:____________Date:_________TIME:________    ** Signature, Date and Time must be completed for valid certification **    Please sign and return to [facility] - [address]  [phone] fax

## 2019-04-22 ENCOUNTER — HOSPITAL ENCOUNTER (OUTPATIENT)
Dept: PHYSICAL THERAPY | Age: 69
Discharge: HOME | End: 2019-04-22
Payer: MEDICARE

## 2019-04-22 PROCEDURE — 97110 THERAPEUTIC EXERCISES: CPT

## 2019-04-22 PROCEDURE — 97018 PARAFFIN BATH THERAPY: CPT

## 2019-04-22 PROCEDURE — 97535 SELF CARE MNGMENT TRAINING: CPT

## 2019-04-22 NOTE — PROGRESS NOTES
OT DAILY TREATMENT NOTE 10-18    Patient Name: Hyun Prieto  Date:2019  : 1950  [x]  Patient  Verified  Payor: VA MEDICARE / Plan: VA MEDICARE PART A & B / Product Type: Medicare /    In time:100  Out time:145  Total Treatment Time (min): 45  Visit #: 2 of -    Medicare/BCBS Only   Total Timed Codes (min):  35 1:1 Treatment Time:  45     Treatment Area: Pain in right hand [M79.641]    SUBJECTIVE  Pain Level (0-10 scale): 1/10  Any medication changes, allergies to medications, adverse drug reactions, diagnosis change, or new procedure performed?: [x] No    [] Yes (see summary sheet for update)  Subjective functional status/changes:   [] No changes reported  I have some trouble with brushing my teeth.     OBJECTIVE            Modality rationale: decrease pain and increase tissue extensibility to improve the patients ability to grasp   Min Type Additional Details      [] Estim:  []Unatt       []IFC  []Premod                        []Other:  []w/ice   []w/heat  Position:  Location:      [] Estim: []Att    []TENS instruct  []NMES                    []Other:  []w/US   []w/ice   []w/heat  Position:  Location:      []  Traction: [] Cervical       []Lumbar                       [] Prone          []Supine                       []Intermittent   []Continuous Lbs:  [] before manual  [] after manual      []  Ultrasound: []Continuous   [] Pulsed                           []1MHz   []3MHz W/cm2:  Location:      []  Iontophoresis with dexamethasone         Location: [] Take home patch   [] In clinic      []  Ice     []  heat  []  Ice massage  []  Laser   []  Anodyne Position:  Location:       10 []  Laser with stim  [x]  Other:Praffin  Position:  Location:right hand      []  Vasopneumatic Device Pressure:       [] lo [] med [] hi   Temperature: [] lo [] med [] hi       [x] Skin assessment post-treatment:  [x]intact []redness- no adverse reaction  []redness - adverse reaction:      25 min Therapeutic Exercise: [] See flow sheet :   Rationale: increase ROM and increase strength to improve the patients ability to , grasp    Towel Scrunch: 10x  Tendon Glides: 5x  Blocking: SF  Soft Theraputty: manipulated with Right hand to reveal and remove 1/4 in pegs   Dexterity Balls: 20x    10 min Self Care/Home Management: AE   Rationale: AE, Strategies  to improve the patients ability to grasp, perform ADL/IADL's with increased independence/ease     Cylindrical foam Tubing administered with review of uses  Dycem administered with review of uses, Hand out provided for additional uses        With   [] TE   [] TA   [] neuro   [] other: Patient Education: [x] Review HEP    [] Progressed/Changed HEP based on:   [] positioning   [] body mechanics   [] transfers   [] heat/ice application   [] Splint wear/care   [] Sensory re-education   [] scar management      [] other:            Other Objective/Functional Measures:     Pt able to demonstrate use of dycem to open jar     Pain Level (0-10 scale) post treatment: 0/10    ASSESSMENT/Changes in Function: strength improving     Patient will continue to benefit from skilled OT services to modify and progress therapeutic interventions, address ROM deficits, address strength deficits and instruct in home and community integration to attain remaining goals. []  See Plan of Care  []  See progress note/recertification  []  See Discharge Summary         Progress towards goals / Updated goals:  Short Term Goals: To be accomplished in 2 weeks:  1. Patient will be independent in home exercise program for digit ROM to improve grasp. Progressing with towel slides and tendon glides 4/22/19  2. Patient to be independent in edema management strategies. Met 4/22/19  3. Patient will be able to open jars and bottles with task modifications. Progressing with use of Dycem 4/22/19     Long Term Goals:  To be accomplished in 8 weeks:              1.  Patient will increase  strength in right hand by 20 pounds to increase ease of opening jars  2. Patient will be able to close hand tightly to secure coins and small items in palm.   3..  Patient will increase all pinches at least 2-3# for opening packages.           PLAN  []  Upgrade activities as tolerated     [x]  Continue plan of care  []  Update interventions per flow sheet       []  Discharge due to:_  []  Other:_      LISA KrauseA/SINAN 4/22/2019  12:07 PM    Future Appointments   Date Time Provider Manasa Meza   4/22/2019  1:00 PM Jacy Lockhart [id number] [facility]   4/24/2019  5:30 PM Jacypraful Lockhart [id number] [facility]   4/29/2019  3:15 PM Jacypraful Lockhart BOCMAEX [facility]   5/13/2019  3:15 PM Jacypraful Lockhart XOKYPHJ [facility]   5/15/2019  3:15 PM Bob Oreilly, OTR/L MMCPTPB [facility]   5/17/2019  3:30 PM Bob Oreilly, OTR/L MMCPTPB [facility]   5/20/2019  3:15 PM Jacypraful Lockhart QSXCTSR [facility]   5/22/2019  3:15 PM Bob Oreilly, OTR/L MMCPTPB [facility]   5/24/2019  3:30 PM Jacypraful Lockhart YALHBBY [facility]   5/28/2019  3:15 PM Jacypraful Lockhart FOALEKSANDRICZ [facility]   5/30/2019  2:30 PM Jacypraful Lockhart QMEMVWJ [facility]

## 2019-05-13 ENCOUNTER — HOSPITAL ENCOUNTER (OUTPATIENT)
Dept: PHYSICAL THERAPY | Age: 69
Discharge: HOME | End: 2019-05-13
Payer: MEDICARE

## 2019-05-13 ENCOUNTER — OFFICE VISIT (OUTPATIENT)
Dept: SURGERY | Age: 69
End: 2019-05-13

## 2019-05-13 VITALS
TEMPERATURE: 97.5 F | WEIGHT: 122.8 LBS | DIASTOLIC BLOOD PRESSURE: 72 MMHG | BODY MASS INDEX: 24.11 KG/M2 | HEART RATE: 72 BPM | SYSTOLIC BLOOD PRESSURE: 128 MMHG | OXYGEN SATURATION: 98 % | HEIGHT: 60 IN | RESPIRATION RATE: 18 BRPM

## 2019-05-13 DIAGNOSIS — K57.32 DIVERTICULITIS OF COLON: Primary | ICD-10-CM

## 2019-05-13 PROCEDURE — 97530 THERAPEUTIC ACTIVITIES: CPT

## 2019-05-13 PROCEDURE — 97110 THERAPEUTIC EXERCISES: CPT

## 2019-05-13 PROCEDURE — 97018 PARAFFIN BATH THERAPY: CPT

## 2019-05-13 RX ORDER — AMOXICILLIN AND CLAVULANATE POTASSIUM 875; 125 MG/1; MG/1
TABLET, FILM COATED ORAL EVERY 12 HOURS
COMMUNITY
End: 2019-06-09

## 2019-05-13 NOTE — PROGRESS NOTES
OT DAILY TREATMENT NOTE 10-18    Patient Name: Andre Capps  Date:2019  : 1950  [x]  Patient  Verified  Payor: VA MEDICARE / Plan: VA MEDICARE PART A & B / Product Type: Medicare /    In time:315  Out time:357  Total Treatment Time (min): 42  Visit #: 4 of     Medicare/BCBS Only   Total Timed Codes (min):  32 1:1 Treatment Time:  42     Treatment Area: Pain in right hand [M79.641]    SUBJECTIVE  Pain Level (0-10 scale): 0/10  Any medication changes, allergies to medications, adverse drug reactions, diagnosis change, or new procedure performed?: [x] No    [] Yes (see summary sheet for update)  Subjective functional status/changes:   [] No changes reported  Pt reports having to go to ER, twice while on vacation due to diverticulitis.     OBJECTIVE                Modality rationale: decrease pain and increase tissue extensibility to improve the patients ability to grasp   Min Type Additional Details       [] Estim:  []Unatt       []IFC  []Premod                        []Other:  []w/ice   []w/heat  Position:  Location:       [] Estim: []Att    []TENS instruct  []NMES                    []Other:  []w/US   []w/ice   []w/heat  Position:  Location:       []  Traction: [] Cervical       []Lumbar                       [] Prone          []Supine                       []Intermittent   []Continuous Lbs:  [] before manual  [] after manual       []  Ultrasound: []Continuous   [] Pulsed                           []1MHz   []3MHz W/cm2:  Location:       []  Iontophoresis with dexamethasone         Location: [] Take home patch   [] In clinic       []  Ice     []  heat  []  Ice massage  []  Laser   []  Anodyne Position:  Location:        10 []  Laser with stim  [x]  Other:Paraffin  Position:  Location:right hand       []  Vasopneumatic Device Pressure:       [] lo [] med [] hi   Temperature: [] lo [] med [] hi        [x] Skin assessment post-treatment:  [x]intact []redness- no adverse reaction  []redness - adverse reaction:      15 min Therapeutic Exercise:  [] See flow sheet :   Rationale: increase ROM and increase strength to improve the patients ability to     Soft theraputty manipulate with Right hand to reveal/remove 1/4 in pegs 10/10   Towel Scrunch 10x       17 min Therapeutic Activity:  []  See flow sheet :   Rationale: increase ROM and improve coordination  to improve the patients ability to manipulate small items    1/4 in pegs translated palm <> digit for placement/removal 40x   1in pegs placed in sets of 3 into resistive pegboard 50x          With   [] TE   [] TA   [] neuro   [] other: Patient Education: [x] Review HEP    [] Progressed/Changed HEP based on:   [] positioning   [] body mechanics   [] transfers   [] heat/ice application   [] Splint wear/care   [] Sensory re-education   [] scar management      [] other:            Other Objective/Functional Measures:     Can touch digits 2-4 to palm, unable with 5th digit      Pain Level (0-10 scale) post treatment: 0/10    ASSESSMENT/Changes in Function: Strength improving     Patient will continue to benefit from skilled OT services to modify and progress therapeutic interventions, address ROM deficits, address strength deficits and instruct in home and community integration to attain remaining goals. []  See Plan of Care  []  See progress note/recertification  []  See Discharge Summary         Progress towards goals / Updated goals:  Short Term Goals:  To be accomplished in 2 weeks:  1.  Patient will be independent in home exercise program for digit ROM to improve grasp Met 4/29/19  2.  Patient to be independent in edema management strategies. Met 4/22/19  3.  Patient will be able to open jars and bottles with task modifications. Able to open jars with use of Dycem per Pt report 4/29/19     Long Term Goals: To be accomplished in 8 weeks:              1.  Patient will increase  strength in right hand by 20 pounds to increase ease of opening jars Progressing, added in gripper at 15# 5/13/19  2. Gabrielle White will be able to close hand tightly to secure coins and small items in palm. Progressing with in clinic activities 4/29/19  3. . Gabrielle White will increase all pinches at least 2-3# for opening packages.         PLAN  []  Upgrade activities as tolerated     [x]  Continue plan of care  []  Update interventions per flow sheet       []  Discharge due to:_  []  Other:_      ZACHERY Anderson/L 5/13/2019  8:34 AM    Future Appointments   Date Time Provider Manasa Meza   5/13/2019  9:15 AM Sugar Stern MD BSSSHV [facility]   5/13/2019  3:15 PM Toney Aaron FHRTCVY [facility]   5/15/2019  3:15 PM Toney Aaron ROGVKFQ [facility]   5/17/2019  3:30 PM Toney Aaron ICXUIEM [facility]   5/20/2019  3:15 PM Toney Aaron FQWIRWL [facility]   5/22/2019  3:15 PM Toney Aaron EXSQMLP [facility]   5/24/2019  3:30 PM Toney Aaron JHYTBZP [facility]   5/28/2019  3:15 PM Toney Aaron EPVWQKH [facility]   5/30/2019  2:30 PM Toney Aaron MURIDTE [facility]

## 2019-05-13 NOTE — LETTER
5/13/19 Patient: Liset Israel YOB: 1950 Date of Visit: 5/13/2019 Carrillo Patel MD 
[address] [place] [address] Drive [address] [facility] [address] [address] [address] VIA In Basket Itzel Rivas NP 
Jen [phone] [address] [address] VIA In Basket Dear Ganesh Hooper saw Esther Morejon in the office today for recurrent diverticulitis. Her first attack was in November of last year. Although she has resolved the attack it is recurrent in nature, and currently she is experience a third attack. This is likely persistence of her disease. Her exam is significant for mild left lower quadrant tenderness. Her last colonoscopy by you in February was normal aside from diverticular disease. I will have her complete her current antibiotic course and she will follow-up in the office in 2 weeks time. I have recommended surgery and we will bring her to the operating room after resolution of the current attack for robotic sigmoid colectomy. If you have questions, please do not hesitate to call me. I look forward to following your patient along with you. Sincerely, Claria Dancer, MD

## 2019-05-13 NOTE — PROGRESS NOTES
HPI: Hemant Snyder is a 79 y.o. female presenting with chief complain of recurrent diverticulitis. The patient had a first attack in November 2018. It was treated with 2 weeks of antibiotics as an outpatient. On completion her pain had resolved. She had a colonoscopy in February showing only diverticulosis. Approximately 1 month later she had worsening abdominal pain once again. She has been on antibiotics for that episode and then most recently while in [place] a third episode. She states her pain is currently mild. This is her first string of attacks of diverticulitis. She has variable amounts of bowel movements per day. She has loose stools on occasion. She has fecal leakage on occasion over the last 3 years. She has lost approximately 5 pounds. She has significant nausea. She denies blood per rectum. Past Medical History:   Diagnosis Date    Depression     Diverticulitis        Past Surgical History:   Procedure Laterality Date    HX COLONOSCOPY  2007; 2/2019    neg; 2nd colo showed diverticula    HX OTHER SURGICAL      Right arm       Family History   Problem Relation Age of Onset    Hypertension Father     Heart Disease Father     No Known Problems Mother     Heart Attack Brother         age 48    Heart Attack Paternal Grandfather         age 72    Heart Attack Brother         age 62       Social History     Socioeconomic History    Marital status:      Spouse name: Not on file    Number of children: Not on file    Years of education: Not on file    Highest education level: Not on file   Tobacco Use    Smoking status: Never Smoker    Smokeless tobacco: Never Used   Substance and Sexual Activity    Alcohol use: Yes     Comment: occassionally    Drug use: No       Review of Systems - Review of Systems   Constitutional: Positive for malaise/fatigue and weight loss. Negative for chills, diaphoresis and fever. HENT: Negative. Eyes: Negative.     Respiratory: Negative. Cardiovascular: Negative. Gastrointestinal: Positive for abdominal pain, blood in stool, constipation, diarrhea, melena and nausea. Negative for heartburn and vomiting. Genitourinary: Negative. Musculoskeletal: Negative. Skin: Negative. Neurological: Positive for dizziness, tremors, sensory change, speech change, focal weakness and weakness. Negative for tingling, seizures, loss of consciousness and headaches. Endo/Heme/Allergies: Negative. Psychiatric/Behavioral: Positive for memory loss. Negative for depression, hallucinations, substance abuse and suicidal ideas. The patient is nervous/anxious and has insomnia. Outpatient Medications Marked as Taking for the 5/13/19 encounter (Office Visit) with Devon Enriquez MD   Medication Sig Dispense Refill    amoxicillin-clavulanate (AUGMENTIN) 875-125 mg per tablet Take  by mouth every twelve (12) hours.  hydrocortisone (ANUSOL-HC) 2.5 % rectal cream Insert  into rectum four (4) times daily. 30 g 0    ondansetron (ZOFRAN ODT) 4 mg disintegrating tablet Take 1 Tab by mouth every eight (8) hours as needed for Nausea. 14 Tab 0       Allergies   Allergen Reactions    Septra [Sulfamethoprim Ds] Rash       Vitals:    05/13/19 0925   Pulse: 72   Resp: 18   Temp: 97.5 °F (36.4 °C)   TempSrc: Oral   SpO2: 98%   Weight: 55.7 kg (122 lb 12.8 oz)   Height: 5' (1.524 m)   PainSc:   3   PainLoc: Abdomen       Physical Exam   Constitutional: She appears well-developed and well-nourished. HENT:   Head: Normocephalic and atraumatic. Eyes: Conjunctivae and EOM are normal.   Abdominal: Soft. She exhibits no distension. There is no tenderness (Mild, left lower quadrant). Musculoskeletal: Normal range of motion. Lymphadenopathy:     She has no cervical adenopathy. Right: No inguinal adenopathy present. Left: No inguinal adenopathy present. Neurological: She exhibits normal muscle tone. Skin: Skin is warm and dry.  No rash noted. Psychiatric: She has a normal mood and affect. Her speech is normal.     Notes reviewed from gastrointestinal doctor. Recurrent diverticulitis. Colonoscopy was normal.  CT imaging personally visualized by me, consistent with diverticulitis of the sigmoid colon, no abscess. Assessment / Plan    Recurrent diverticulitis for the last 6 months  Complete antibiotic course, patient to call if she still has pain at the conclusion  and will extend if necessary  Will likely benefit from resection  Follow-up in the office in 2 weeks    The diagnoses and plan were discussed with the patient. All questions answered. Plan of care agreed to by all concerned.

## 2019-05-17 ENCOUNTER — HOSPITAL ENCOUNTER (OUTPATIENT)
Dept: PHYSICAL THERAPY | Age: 69
Discharge: HOME | End: 2019-05-17
Payer: MEDICARE

## 2019-05-17 PROCEDURE — 97110 THERAPEUTIC EXERCISES: CPT

## 2019-05-17 PROCEDURE — 97018 PARAFFIN BATH THERAPY: CPT

## 2019-05-17 NOTE — PROGRESS NOTES
OT DAILY TREATMENT NOTE 10-18    Patient Name: Kelsea Brunson  Date:2019  : 1950  [x]  Patient  Verified  Payor: Harini Huddleston / Plan: [place] MED[place] & [place] / Product Type: Medicare /    In time:333  Out time:422  Total Treatment Time (min): 49  Visit #: 5 of 12    Medicare/BCBS Only   Total Timed Codes (min):  28 1:1 Treatment Time:  28     Treatment Area: Pain in right hand [M79.641]    SUBJECTIVE  Pain Level (0-10 scale): 0/10  Any medication changes, allergies to medications, adverse drug reactions, diagnosis change, or new procedure performed?: [x] No    [] Yes (see summary sheet for update)  Subjective functional status/changes:   [] No changes reported  Pt reports improvement with hand with personal goal of improving ability to grasp    OBJECTIVE                Modality rationale: decrease pain and increase tissue extensibility to improve the patients ability to grasp   Min Type Additional Details       [] Estim:  []Unatt       []IFC  []Premod                        []Other:  []w/ice   []w/heat  Position:  Location:       [] Estim: []Att    []TENS instruct  []NMES                    []Other:  []w/US   []w/ice   []w/heat  Position:  Location:       []  Traction: [] Cervical       []Lumbar                       [] Prone          []Supine                       []Intermittent   []Continuous Lbs:  [] before manual  [] after manual       []  Ultrasound: []Continuous   [] Pulsed                           []1MHz   []3MHz W/cm2:  Location:       []  Iontophoresis with dexamethasone         Location: [] Take home patch   [] In clinic       []  Ice     []  heat  []  Ice massage  []  Laser   []  Anodyne Position:  Location:        10 []  Laser with stim  [x]  Other:Paraffin  Position:  Location:right hand       []  Vasopneumatic Device Pressure:       [] lo [] med [] hi   Temperature: [] lo [] med [] hi        [x] Skin assessment post-treatment:  [x]intact []redness- no adverse reaction  []redness - adverse reaction:       39 min Therapeutic Exercise:  [] See flow sheet :   Rationale: increase ROM and increase strength to improve the patients ability to     Recheck: Wrist ROM, Digit ROM, , Pinches  Towel Scrunch 10x  Soft Theraputty: 10/10          With   [] TE   [] TA   [] neuro   [] other: Patient Education: [x] Review HEP    [] Progressed/Changed HEP based on:   [] positioning   [] body mechanics   [] transfers   [] heat/ice application   [] Splint wear/care   [] Sensory re-education   [] scar management      [] other:            Other Objective/Functional Measures:        Range of Motion:                 Eval-4/18 PN- 5/17/19       Norms Right Left Right Change   Wrist Flex 0-80 63 72  63       Ext 0-70 68 70  73  +5     Ulnar Dev 0-30 15 32         Radial Dev 0-20 20 30           Hand ROM R/L- Eval 4/18/19    Index Middle Ring Small   MP 90/85 90/95 80/90 90/90   PIP 80/-115 90/110 90/110 80/105   DIP 65/65 60/70 60/65 60/65               REEVES  235  240  230  230       Hand ROM-Right - 5/17/19    Index Middle Ring Small   MP  0-88  0-85  0-85  0-90   PIP  0-90  0-89  0-90  0-75   DIP  0-53  0-65  0-55  0-50               REEVES  231  239  230  215   Change -4 -1  -15              Measurements: Taken with Shiv Dynamometer, in Lbs   Level 2 4/19 5/18 Change   Right 15 20 +5   Left 30            Pinch Measurements: Taken with Pinch Gauge, in Lbs   (hand) 4/19 5/18 Change   3 pt      Right 7 8 +1   Left  10                   Lateral      Right 10 10 0   Left 10                   Tip      Right 6 7 +1   Left 6                           FOTO   4/19 5/18   Score 69 67   Change  -2           Pain Level (0-10 scale) post treatment: 0/10    ASSESSMENT/Changes in Function:  Slight increase in  strength/pinches with no major changes with ROM possible due to infrequent visits secondary to being out of town for 1.5 weeks combined with recent diverticulitis flair up.       Patient will continue to benefit from skilled OT services to modify and progress therapeutic interventions, address ROM deficits, address strength deficits and instruct in home and community integration to attain remaining goals. []  See Plan of Care  [x]  See progress note/recertification  []  See Discharge Summary         Progress towards goals / Updated goals:  Short Term Goals: To be accomplished in 2 weeks:  1.  Patient will be independent in home exercise program for digit ROM to improve grasp Met 4/29/19  2.  Patient to be independent in edema management strategies. Met 4/22/19  3.  Patient will be able to open jars and bottles with task modifications. Met 5/17/19     Long Term Goals: To be accomplished in 8 weeks:              1.  Patient will increase  strength in right hand by 20 pounds to increase ease of opening jars +5 5/17/19  2.  Patient will be able to close hand tightly to secure coins and small items in palm. Unable at this time due to limited ROM with [facility] 5/17/19  3. . Moses Marcus will increase all pinches at least 2-3# for opening packages +1, 5/17/19        PLAN  [x]  Upgrade activities as tolerated     [x]  Continue plan of care  []  Update interventions per flow sheet       []  Discharge due to:_  []  Other:_      MADAN Delgado 5/17/2019  3:26 PM    Future Appointments   Date Time Provider Manasa Meza   5/17/2019  3:30 PM Amanda Anaya [id number] [facility]   5/20/2019  3:15 PM Amanda Anaya HLFLZUS [facility]   5/22/2019  3:15 PM Amanda Anaya [id number] [facility]   5/24/2019  3:30 PM Amanda Anaya RMDFCIE [facility]   5/28/2019  3:15 PM Amanda Anaya [id number] [facility]   5/30/2019 10:45 AM Zachary Salomon  [address]   5/30/2019  2:30 PM Amanda Anaya [id number] [facility]

## 2019-05-18 DIAGNOSIS — K57.92 DIVERTICULITIS: Primary | ICD-10-CM

## 2019-05-18 RX ORDER — CIPROFLOXACIN 500 MG/1
500 TABLET ORAL 2 TIMES DAILY
Qty: 28 TAB | Refills: 0 | Status: SHIPPED | OUTPATIENT
Start: 2019-05-18 | End: 2019-06-01

## 2019-05-18 RX ORDER — METRONIDAZOLE 500 MG/1
500 TABLET ORAL 3 TIMES DAILY
Qty: 42 TAB | Refills: 0 | Status: SHIPPED | OUTPATIENT
Start: 2019-05-18 | End: 2019-06-01

## 2019-05-20 ENCOUNTER — HOSPITAL ENCOUNTER (OUTPATIENT)
Dept: PHYSICAL THERAPY | Age: 69
Discharge: HOME | End: 2019-05-20
Payer: MEDICARE

## 2019-05-20 PROCEDURE — 97110 THERAPEUTIC EXERCISES: CPT

## 2019-05-20 PROCEDURE — 97018 PARAFFIN BATH THERAPY: CPT

## 2019-05-20 PROCEDURE — 97530 THERAPEUTIC ACTIVITIES: CPT

## 2019-05-20 NOTE — PROGRESS NOTES
OT DAILY TREATMENT NOTE 10-18    Patient Name: Pete Yost  Date:2019  : 1950  [x]  Patient  Verified  Payor: [place] MEDICARE / Plan: [place] MEDICARE PART A & B / Product Type: Medicare /    In time:320  Out time:403  Total Treatment Time (min): 43  Visit #: 6 of 12    Medicare/BCBS Only   Total Timed Codes (min):  33 1:1 Treatment Time:  43     Treatment Area: Pain in right hand [M79.641]    SUBJECTIVE  Pain Level (0-10 scale): 0/10  Any medication changes, allergies to medications, adverse drug reactions, diagnosis change, or new procedure performed?: [x] No    [] Yes (see summary sheet for update)  Subjective functional status/changes:   [] No changes reported  Pt report some nausea from new bout of diverticulitis. Pt educated on rescheduling appointment if feeling nauseas/dizzy and to not drive under those conditions. Pt voiced understanding and agreement.      OBJECTIVE                Modality rationale: decrease pain and increase tissue extensibility to improve the patients ability to grasp   Min Type Additional Details       [] Estim:  []Unatt       []IFC  []Premod                        []Other:  []w/ice   []w/heat  Position:  Location:       [] Estim: []Att    []TENS instruct  []NMES                    []Other:  []w/US   []w/ice   []w/heat  Position:  Location:       []  Traction: [] Cervical       []Lumbar                       [] Prone          []Supine                       []Intermittent   []Continuous Lbs:  [] before manual  [] after manual       []  Ultrasound: []Continuous   [] Pulsed                           []1MHz   []3MHz W/cm2:  Location:       []  Iontophoresis with dexamethasone         Location: [] Take home patch   [] In clinic       []  Ice     []  heat  []  Ice massage  []  Laser   []  Anodyne Position:  Location:        10 []  Laser with stim  [x]  Other:Paraffin  Position:  Location:right hand       []  Vasopneumatic Device Pressure:       [] lo [] med [] hi   Temperature: [] lo [] med [] hi        [x] Skin assessment post-treatment:  [x]intact []redness- no adverse reaction  []redness - adverse reaction:     21 min Therapeutic Exercise:  [] See flow sheet :   Rationale: increase ROM and increase strength to improve the patients ability to     Towel Scrunch: 10x  Soft Theraputty: 10/10  Recip Opposition into soft theraputty   AROM/Blocking/Hold    12 min Therapeutic Activity:  []  See flow sheet :   Rationale: increase ROM and improve coordination  to improve the patients ability to Manipulate small items, make tight grasp    1/4 in pegs translated palm <> digit for placement/removal 35x        With   [] TE   [] TA   [] neuro   [] other: Patient Education: [x] Review HEP    [] Progressed/Changed HEP based on:   [] positioning   [] body mechanics   [] transfers   [] heat/ice application   [] Splint wear/care   [] Sensory re-education   [] scar management      [] other:            Other Objective/Functional Measures:     Able to touch palm with SF post AROM/Blocking exercise      Pain Level (0-10 scale) post treatment: 2/10    ASSESSMENT/Changes in Function: ROM improving    Patient will continue to benefit from skilled OT services to modify and progress therapeutic interventions, address ROM deficits, address strength deficits and instruct in home and community integration to attain remaining goals. []  See Plan of Care  []  See progress note/recertification  []  See Discharge Summary         Progress towards goals / Updated goals:  Short Term Goals:  To be accomplished in 2 weeks:  1.  Patient will be independent in home exercise program for digit ROM to improve grasp Met 4/29/19  2.  Patient to be independent in edema management strategies. Met 4/22/19  3.  Patient will be able to open jars and bottles with task modifications. Met 5/17/19     Long Term Goals: To be accomplished in 8 weeks:              1.  Patient will increase  strength in right hand by 20 pounds to increase ease of opening jars +5 5/17/19 Progressing with in clinic activities 5/20/19  2. Meli Caotes will be able to close hand tightly to secure coins and small items in palm. Unable at this time due to limited ROM with [facility] 5/17/19 Met at end of session on this date 5/20/19  3. . Meli Coates will increase all pinches at least 2-3# for opening packages +1, 5/17/19        PLAN  []  Upgrade activities as tolerated     [x]  Continue plan of care  []  Update interventions per flow sheet       []  Discharge due to:_  []  Other:_      ZACHERY Hinton/L 5/20/2019  1:12 PM    Future Appointments   Date Time Provider Manasa Meza   5/20/2019  3:15 PM Brittany Harper [id number] [facility]   5/22/2019  3:15 PM Brittany Harper [id number] [facility]   5/24/2019  3:30 PM Brittany Harper [id number] [facility]   5/28/2019  3:15 PM Brittany Harper [id number] [facility]   5/30/2019 10:45 AM Bobbi Cisneros  [address]   5/30/2019  2:30 PM Brittany Harper [id number] [facility]

## 2019-05-22 ENCOUNTER — HOSPITAL ENCOUNTER (OUTPATIENT)
Dept: PHYSICAL THERAPY | Age: 69
Discharge: HOME | End: 2019-05-22
Payer: MEDICARE

## 2019-05-22 PROCEDURE — 97110 THERAPEUTIC EXERCISES: CPT

## 2019-05-22 PROCEDURE — 97530 THERAPEUTIC ACTIVITIES: CPT

## 2019-05-22 PROCEDURE — 97018 PARAFFIN BATH THERAPY: CPT

## 2019-05-22 NOTE — PROGRESS NOTES
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 Short Term Goals: To be accomplished in 2 weeks:  1.  Patient will be independent in home exercise program for digit ROM to improve grasp Met 19  2.  Patient to be independent in edema management strategies. Met 19  3.  Patient will be able to open jars and bottles with task modifications. Met 19  Long Term Goals: To be accomplished in 8 weeks:              1.  Patient will increase  strength in right hand by 20 pounds to increase ease of opening jars +5 19 Progressing with in clinic activities 19  2.  Patient will be able to close hand tightly to secure coins and small items in palm. Met at end of session 19  3. . [place] will increase all pinches at least 2-3# for opening packages +1, 19    Key functional changes:   Range of Motion:                        Eval- PN- 19       Norms Right Left Right Change   Wrist Flex 0-80 63 72  63       Ext 0-70 68 70  73  +5     Ulnar Dev 0-30 15 32         Radial Dev 0-20 20 30             Hand ROM R/L- Eval 19    Index Middle Ring Small   MP 90/85 90/95 80/90 90/90   PIP 80/-115 90/110 90/110 80/105   DIP 65/65 60/70 60/65 60/65               REEVES  235  240  230  230         Hand ROM-Right - 5/17/19    Index Middle Ring Small   MP  0-88  0-85  0-85  0-90   PIP  0-90  0-89  0-90  0-75   DIP  0-53  0-65  0-55  0-50               REEVES  231  239  230  215   Change -4 -1   -15                   Measurements: Taken with Shiv Dynamometer, in Lbs   Level 2 4/19 5/18 Change   Right 15 20 +5   Left 30             Pinch Measurements: Taken with Pinch Gauge, in Lbs   (hand) 4/19 5/18 Change   3 pt         Right 7 8 +1   Left  10                           Lateral         Right 10 10 0   Left 10                           Tip         Right 6 7 +1   Left 6                              FOTO    4/19 5/18   Score 69 67   Change   -2      Problems/ barriers to goal attainment: none     Assessment / Recommendations:Slight increase in  strength/pinches with no major changes with ROM possible due to infrequent visits secondary to being out of town for 1.5 weeks combined with recent diverticulitis flair up. No improvement with digit ROM and minimal improvement with wrist AROM. Problem List: Pain effecting function, Decreased range of motion and Decreased strength   Treatment Plan: Therapeutic exercise, Therapeutic activities, Neuromuscular re-education, Physical agent/modality, Scar management, Manual therapy, Splinting/orthoses, Patient education and ADLs/IADLs    Patient Goal (s) has been updated and includes:   Updated Goals to be accomplished in 4 weeks:  1.  Patient will increase  strength in right hand by 20 pounds to increase ease of opening jars   2.  Patient will increase all pinches at least 2-3# for opening packages. 3.  Patient will regain 240 degrees total arc of motion of the right hand digits to enable grasp of cylindrical objects such as a glass, handle or toothbrush.     Frequency / Duration: Patient to be seen 2-3 times per week for 4 weeks:    Margo Carrington OT 5/22/2019 5:17 PM

## 2019-05-22 NOTE — PROGRESS NOTES
OT DAILY TREATMENT NOTE 10-18    Patient Name: Kelsea Brunson  Date:2019  : 1950  [x]  Patient  Verified  Payor: VA MEDICARE / Plan: VA MEDICARE PART A & B / Product Type: Medicare /    In time:317  Out time:355  Total Treatment Time (min): 38  Visit #: 7 of 12    Medicare/BCBS Only   Total Timed Codes (min):  28 1:1 Treatment Time:  38     Treatment Area: Pain in right hand [M79.971]    SUBJECTIVE  Pain Level (0-10 scale): 0/10  Any medication changes, allergies to medications, adverse drug reactions, diagnosis change, or new procedure performed?: [x] No    [] Yes (see summary sheet for update)  Subjective functional status/changes:   [] No changes reported  Pt reports feeling better due to another round of antibiotics     OBJECTIVE              Modality rationale: decrease pain and increase tissue extensibility to improve the patients ability to grasp   Min Type Additional Details       [] Estim:  []Unatt       []IFC  []Premod                        []Other:  []w/ice   []w/heat  Position:  Location:       [] Estim: []Att    []TENS instruct  []NMES                    []Other:  []w/US   []w/ice   []w/heat  Position:  Location:       []  Traction: [] Cervical       []Lumbar                       [] Prone          []Supine                       []Intermittent   []Continuous Lbs:  [] before manual  [] after manual       []  Ultrasound: []Continuous   [] Pulsed                           []1MHz   []3MHz W/cm2:  Location:       []  Iontophoresis with dexamethasone         Location: [] Take home patch   [] In clinic       []  Ice     []  heat  []  Ice massage  []  Laser   []  Anodyne Position:  Location:        10 []  Laser with stim  [x]  Other:Paraffin  Position:  Location:right hand       []  Vasopneumatic Device Pressure:       [] lo [] med [] hi   Temperature: [] lo [] med [] hi        [x] Skin assessment post-treatment:  [x]intact []redness- no adverse reaction  []redness - adverse reaction:     15 min Therapeutic Exercise:  [] See flow sheet :   Rationale: increase ROM and increase strength to improve the patients ability to     Upgraded: Medium Putty manipulated with Right Hand to reveal/remove 1/4 in pegs   Blocking/PROM: SF+RF  Flat Fist into Medium Putty  Towel Scrunch     13 min Therapeutic Activity:  []  See flow sheet :   Rationale: increase ROM and improve coordination  to improve the patients ability to manipulate small items    1/4 in pegs translated palm <> digit for placement/removal 35x           With   [] TE   [] TA   [] neuro   [] other: Patient Education: [x] Review HEP    [] Progressed/Changed HEP based on:   [] positioning   [] body mechanics   [] transfers   [] heat/ice application   [] Splint wear/care   [] Sensory re-education   [] scar management      [] other:            Other Objective/Functional Measures:     No drops with translation tasks on this date    Able to touch all digits to palm consecutive times at end of session      Pain Level (0-10 scale) post treatment: 0/10    ASSESSMENT/Changes in Function: Strength/ROM improving     Patient will continue to benefit from skilled OT services to modify and progress therapeutic interventions, address ROM deficits, address strength deficits and instruct in home and community integration to attain remaining goals. []  See Plan of Care  []  See progress note/recertification  []  See Discharge Summary         Progress towards goals / Updated goals:  Short Term Goals:  To be accomplished in 2 weeks:  1.  Patient will be independent in home exercise program for digit ROM to improve grasp Met 4/29/19  2.  Patient to be independent in edema management strategies. Met 4/22/19  3.  Patient will be able to open jars and bottles with task modifications. Met 5/17/19     Long Term Goals: To be accomplished in 8 weeks:              1.  Patient will increase  strength in right hand by 20 pounds to increase ease of opening jars +5 5/17/19 Progressing with in clinic activities 5/20/19  2. Aditi Mae will be able to close hand tightly to secure coins and small items in palm. Met at end of session 5/22/19  3. . Aditi Mae will increase all pinches at least 2-3# for opening packages +1, 5/17/19        PLAN  []  Upgrade activities as tolerated     [x]  Continue plan of care  []  Update interventions per flow sheet       []  Discharge due to:_  []  Other:_      MADAN Shah 5/22/2019  1:04 PM    Future Appointments   Date Time Provider Manasa Meza   5/22/2019  3:15 PM Alison Khan PGEOVIG [facility]   5/24/2019  3:30 PM Alisno Khan [id number] [facility]   5/28/2019  3:15 PM Alison Khan [id number] [facility]   5/30/2019 10:45 AM Eulalio Louis  [address]   5/30/2019  2:30 PM Alison Khan [id number] [facility]

## 2019-05-24 ENCOUNTER — TELEPHONE (OUTPATIENT)
Dept: SURGERY | Age: 69
End: 2019-05-24

## 2019-05-24 ENCOUNTER — APPOINTMENT (OUTPATIENT)
Dept: PHYSICAL THERAPY | Age: 69
End: 2019-05-24
Payer: MEDICARE

## 2019-05-24 NOTE — TELEPHONE ENCOUNTER
\"im having another flare of diverticulitis extremely nauseous and I know its going to get worse\" discussed with dr briggs she is taking her antibiotics to start just clear liquids and if not able to keep down or condition worsens to go to er

## 2019-05-28 ENCOUNTER — HOSPITAL ENCOUNTER (OUTPATIENT)
Dept: PHYSICAL THERAPY | Age: 69
Discharge: HOME | End: 2019-05-28
Payer: MEDICARE

## 2019-05-28 PROCEDURE — 97110 THERAPEUTIC EXERCISES: CPT

## 2019-05-28 PROCEDURE — 97018 PARAFFIN BATH THERAPY: CPT

## 2019-05-30 ENCOUNTER — OFFICE VISIT (OUTPATIENT)
Dept: SURGERY | Age: 69
End: 2019-05-30

## 2019-05-30 ENCOUNTER — HOSPITAL ENCOUNTER (OUTPATIENT)
Dept: PHYSICAL THERAPY | Age: 69
Discharge: HOME | End: 2019-05-30
Payer: MEDICARE

## 2019-05-30 VITALS
SYSTOLIC BLOOD PRESSURE: 120 MMHG | HEART RATE: 100 BPM | DIASTOLIC BLOOD PRESSURE: 76 MMHG | WEIGHT: 119 LBS | OXYGEN SATURATION: 96 % | RESPIRATION RATE: 18 BRPM | BODY MASS INDEX: 23.36 KG/M2 | HEIGHT: 60 IN | TEMPERATURE: 97.7 F

## 2019-05-30 DIAGNOSIS — K57.32 DIVERTICULITIS OF COLON: Primary | ICD-10-CM

## 2019-05-30 PROCEDURE — 97018 PARAFFIN BATH THERAPY: CPT

## 2019-05-30 PROCEDURE — 97110 THERAPEUTIC EXERCISES: CPT

## 2019-05-30 RX ORDER — METRONIDAZOLE 500 MG/1
500 TABLET ORAL 3 TIMES DAILY
Qty: 3 TAB | Refills: 0 | Status: SHIPPED | OUTPATIENT
Start: 2019-05-30 | End: 2019-05-31

## 2019-05-30 RX ORDER — NEOMYCIN SULFATE 500 MG/1
1000 TABLET ORAL 3 TIMES DAILY
Qty: 6 TAB | Refills: 0 | Status: SHIPPED | OUTPATIENT
Start: 2019-05-30 | End: 2019-05-31

## 2019-05-30 NOTE — PROGRESS NOTES
Subjective: Pain is essentially resolved. Has nearly completed antibiotic course. Past medical history and ROS were reviewed and unchanged. Abdomen soft, nontender nondistended    Assessment / Plan    Chronic diverticulitis for the last 6 months, simple and in the sigmoid colon  Schedule robotic sigmoid colectomy in approximately 1 month    A total of 15 minutes was spent with the patient, with >50% of time spent on counseling and coordination of care. The diagnoses and plan were discussed with patient. All questions answered. Plan of care agreed to by all concerned.

## 2019-05-30 NOTE — PROGRESS NOTES
OT DAILY TREATMENT NOTE 10-18    Patient Name: Sondra Morejon  Date:2019  : 1950  [x]  Patient  Verified  Payor: [facility] MED[facility] / Plan: [place] MEDICARE PART A & B / Product Type: Medicare /    In time:230  Out time:309  Total Treatment Time (min): 39  Visit #: 9 of 12    Medicare/BCBS Only   Total Timed Codes (min):  29 1:1 Treatment Time:  39     Treatment Area: Pain in right hand [M79.531]    SUBJECTIVE  Pain Level (0-10 scale): 0/10  Any medication changes, allergies to medications, adverse drug reactions, diagnosis change, or new procedure performed?: [x] No    [] Yes (see summary sheet for update)  Subjective functional status/changes:   [] No changes reported  Pt reports she will be undergoing surgery for diverticulitis diagnosis     OBJECTIVE    Modality rationale: decrease inflammation, decrease pain and increase tissue extensibility to improve the patients ability to    Min Type Additional Details    [] Estim:  []Unatt       []IFC  []Premod                        []Other:  []w/ice   []w/heat  Position:  Location:    [] Estim: []Att    []TENS instruct  []NMES                    []Other:  []w/US   []w/ice   []w/heat  Position:  Location:    []  Traction: [] Cervical       []Lumbar                       [] Prone          []Supine                       []Intermittent   []Continuous Lbs:  [] before manual  [] after manual    []  Ultrasound: []Continuous   [] Pulsed                           []1MHz   []3MHz Location:  W/cm2:    []  Iontophoresis with dexamethasone         Location: [] Take home patch   [] In clinic    []  Ice     []  heat  []  Ice massage  []  Laser   []  Anodyne Position:  Location:   10 []  Laser with stim  [x]  Other: Paraffin  Position:  Location: Right Hand    []  Vasopneumatic Device Pressure:       [] lo [] med [] hi   Temperature: [] lo [] med [] hi   [x] Skin assessment post-treatment:  [x]intact []redness- no adverse reaction    []redness - adverse reaction:     29 min Therapeutic Exercise:  [] See flow sheet :   Rationale: increase ROM and increase strength to improve the patients ability to /grasp    PROM/Blocking/Place and hold   Medium Putty 10/10  Flat Fist with  Medium Putty  HEP  strengthening with medium putty administered           With   [] TE   [] TA   [] neuro   [] other: Patient Education: [x] Review HEP    [] Progressed/Changed HEP based on:   [] positioning   [] body mechanics   [] transfers   [] heat/ice application   [] Splint wear/care   [] Sensory re-education   [] scar management      [] other:            Other Objective/Functional Measures:     Able to touch palm with all digits      Pain Level (0-10 scale) post treatment: 0/10    ASSESSMENT/Changes in Function: ROM/Strength improving     Patient will continue to benefit from skilled OT services to modify and progress therapeutic interventions, address ROM deficits, address strength deficits and instruct in home and community integration to attain remaining goals. []  See Plan of Care  []  See progress note/recertification  []  See Discharge Summary         Progress towards goals / Updated goals:  Short Term Goals: To be accomplished in 2 weeks:  1.  Patient will be independent in home exercise program for digit ROM to improve grasp Met 4/29/19  2.  Patient to be independent in edema management strategies. Met 4/22/19  3.  Patient will be able to open jars and bottles with task modifications. Met 5/17/19     Long Term Goals: To be accomplished in 8 weeks:              1.  Patient will increase  strength in right hand by 20 pounds to increase ease of opening jars +5 5/17/19 Progressing with in clinic activities 5/28/19 Putty HEP for strengthening administered 5/30/19  2.  Patient will be able to close hand tightly to secure coins and small items in palm. Met at end of session 5/22/19  3. . Alok Darby will increase all pinches at least 2-3# for opening packages +1, 5/17/19        PLAN  []  Upgrade activities as tolerated     [x]  Continue plan of care  []  Update interventions per flow sheet       []  Discharge due to:_  []  Other:_      MADAN Barahona 5/30/2019  2:53 PM    No future appointments.

## 2019-05-30 NOTE — PROGRESS NOTES
Philippe Solorio presents today for   Chief Complaint   Patient presents with    Follow-up     Recurrent diverticulitis for the last 6 months     Patient still nauseas, no pain and no appetite. Still on Cipro and Flagyl with 1 day yet. Is someone accompanying this pt? No    Is the patient using any DME equipment during OV? No    Coordination of Care:  1. Have you been to the ER, urgent care clinic since your last visit? Hospitalized since your last visit? No    2. Have you seen or consulted any other health care providers outside of the [facility] since your last visit? Include any pap smears or colon screening.  No.

## 2019-06-03 ENCOUNTER — APPOINTMENT (OUTPATIENT)
Dept: PHYSICAL THERAPY | Age: 69
End: 2019-06-03

## 2019-06-09 ENCOUNTER — HOSPITAL ENCOUNTER (EMERGENCY)
Age: 69
Discharge: HOME | End: 2019-06-09
Attending: EMERGENCY MEDICINE
Payer: MEDICARE

## 2019-06-09 ENCOUNTER — APPOINTMENT (OUTPATIENT)
Dept: CT IMAGING | Age: 69
End: 2019-06-09
Attending: PHYSICIAN ASSISTANT
Payer: MEDICARE

## 2019-06-09 VITALS
OXYGEN SATURATION: 99 % | SYSTOLIC BLOOD PRESSURE: 117 MMHG | DIASTOLIC BLOOD PRESSURE: 62 MMHG | HEIGHT: 60 IN | RESPIRATION RATE: 18 BRPM | HEART RATE: 99 BPM | WEIGHT: 115 LBS | BODY MASS INDEX: 22.58 KG/M2 | TEMPERATURE: 98.2 F

## 2019-06-09 DIAGNOSIS — K52.9 COLITIS, ACUTE: Primary | ICD-10-CM

## 2019-06-09 LAB
ALBUMIN SERPL-MCNC: 3.3 G/DL (ref 3.4–5)
ALBUMIN/GLOB SERPL: 0.8 {RATIO} (ref 0.8–1.7)
ALP SERPL-CCNC: 75 U/L (ref 45–117)
ALT SERPL-CCNC: 18 U/L (ref 13–56)
ANION GAP SERPL CALC-SCNC: 12 MMOL/L (ref 3–18)
APPEARANCE UR: ABNORMAL
AST SERPL-CCNC: 32 U/L (ref 15–37)
BACTERIA URNS QL MICRO: NEGATIVE /HPF
BASOPHILS # BLD: 0 K/UL (ref 0–0.06)
BASOPHILS NFR BLD: 0 % (ref 0–3)
BILIRUB SERPL-MCNC: 0.7 MG/DL (ref 0.2–1)
BILIRUB UR QL: NEGATIVE
BUN SERPL-MCNC: 12 MG/DL (ref 7–18)
BUN/CREAT SERPL: 15 (ref 12–20)
CALCIUM SERPL-MCNC: 9.1 MG/DL (ref 8.5–10.1)
CHLORIDE SERPL-SCNC: 103 MMOL/L (ref 100–108)
CO2 SERPL-SCNC: 21 MMOL/L (ref 21–32)
COLOR UR: YELLOW
CREAT SERPL-MCNC: 0.8 MG/DL (ref 0.6–1.3)
DIFFERENTIAL METHOD BLD: ABNORMAL
EOSINOPHIL # BLD: 0 K/UL (ref 0–0.4)
EOSINOPHIL NFR BLD: 0 % (ref 0–5)
EPITH CASTS URNS QL MICRO: NORMAL /LPF (ref 0–5)
ERYTHROCYTE [DISTWIDTH] IN BLOOD BY AUTOMATED COUNT: 12.5 % (ref 11.6–14.5)
GLOBULIN SER CALC-MCNC: 4.4 G/DL (ref 2–4)
GLUCOSE SERPL-MCNC: 95 MG/DL (ref 74–99)
GLUCOSE UR STRIP.AUTO-MCNC: NEGATIVE MG/DL
HCT VFR BLD AUTO: 40 % (ref 35–45)
HGB BLD-MCNC: 13.8 G/DL (ref 12–16)
HGB UR QL STRIP: ABNORMAL
KETONES UR QL STRIP.AUTO: 15 MG/DL
LACTATE BLD-SCNC: 0.99 MMOL/L (ref 0.4–2)
LACTATE BLD-SCNC: 2.09 MMOL/L (ref 0.4–2)
LEUKOCYTE ESTERASE UR QL STRIP.AUTO: NEGATIVE
LYMPHOCYTES # BLD: 1.4 K/UL (ref 0.8–3.5)
LYMPHOCYTES NFR BLD: 12 % (ref 20–51)
MCH RBC QN AUTO: 31.4 PG (ref 24–34)
MCHC RBC AUTO-ENTMCNC: 34.5 G/DL (ref 31–37)
MCV RBC AUTO: 91.1 FL (ref 74–97)
MONOCYTES # BLD: 1.6 K/UL (ref 0–1)
MONOCYTES NFR BLD: 14 % (ref 2–9)
NEUTS SEG # BLD: 8.3 K/UL (ref 1.8–8)
NEUTS SEG NFR BLD: 74 % (ref 42–75)
NITRITE UR QL STRIP.AUTO: NEGATIVE
PH UR STRIP: 5 [PH] (ref 5–8)
PLATELET # BLD AUTO: 281 K/UL (ref 135–420)
PLATELET COMMENTS,PCOM: ABNORMAL
PMV BLD AUTO: 10.6 FL (ref 9.2–11.8)
POTASSIUM SERPL-SCNC: 3.7 MMOL/L (ref 3.5–5.5)
PROT SERPL-MCNC: 7.7 G/DL (ref 6.4–8.2)
PROT UR STRIP-MCNC: NEGATIVE MG/DL
RBC # BLD AUTO: 4.39 M/UL (ref 4.2–5.3)
RBC #/AREA URNS HPF: NORMAL /HPF (ref 0–5)
RBC MORPH BLD: ABNORMAL
SODIUM SERPL-SCNC: 136 MMOL/L (ref 136–145)
SP GR UR REFRACTOMETRY: 1.01 (ref 1–1.03)
UROBILINOGEN UR QL STRIP.AUTO: 0.2 EU/DL (ref 0.2–1)
WBC # BLD AUTO: 11.3 K/UL (ref 4.6–13.2)
WBC URNS QL MICRO: NORMAL /HPF (ref 0–4)

## 2019-06-09 PROCEDURE — 99285 EMERGENCY DEPT VISIT HI MDM: CPT

## 2019-06-09 PROCEDURE — 81001 URINALYSIS AUTO W/SCOPE: CPT

## 2019-06-09 PROCEDURE — 96375 TX/PRO/DX INJ NEW DRUG ADDON: CPT

## 2019-06-09 PROCEDURE — 83605 ASSAY OF LACTIC ACID: CPT

## 2019-06-09 PROCEDURE — 74177 CT ABD & PELVIS W/CONTRAST: CPT

## 2019-06-09 PROCEDURE — 85025 COMPLETE CBC W/AUTO DIFF WBC: CPT

## 2019-06-09 PROCEDURE — 74011636320 HC RX REV CODE- 636/320: Performed by: PHYSICIAN ASSISTANT

## 2019-06-09 PROCEDURE — 74011636320 HC RX REV CODE- 636/320: Performed by: EMERGENCY MEDICINE

## 2019-06-09 PROCEDURE — 96374 THER/PROPH/DIAG INJ IV PUSH: CPT

## 2019-06-09 PROCEDURE — 74011250636 HC RX REV CODE- 250/636: Performed by: PHYSICIAN ASSISTANT

## 2019-06-09 PROCEDURE — 80053 COMPREHEN METABOLIC PANEL: CPT

## 2019-06-09 PROCEDURE — 96361 HYDRATE IV INFUSION ADD-ON: CPT

## 2019-06-09 PROCEDURE — 96376 TX/PRO/DX INJ SAME DRUG ADON: CPT

## 2019-06-09 RX ORDER — SODIUM CHLORIDE 0.9 % (FLUSH) 0.9 %
5-10 SYRINGE (ML) INJECTION AS NEEDED
Status: DISCONTINUED | OUTPATIENT
Start: 2019-06-09 | End: 2019-06-09 | Stop reason: HOSPADM

## 2019-06-09 RX ORDER — ONDANSETRON 2 MG/ML
4 INJECTION INTRAMUSCULAR; INTRAVENOUS ONCE
Status: COMPLETED | OUTPATIENT
Start: 2019-06-09 | End: 2019-06-09

## 2019-06-09 RX ORDER — CIPROFLOXACIN 500 MG/1
500 TABLET ORAL 2 TIMES DAILY
Qty: 14 TAB | Refills: 0 | Status: SHIPPED | OUTPATIENT
Start: 2019-06-09 | End: 2019-06-16

## 2019-06-09 RX ORDER — ONDANSETRON 4 MG/1
4 TABLET, ORALLY DISINTEGRATING ORAL
Qty: 14 TAB | Refills: 0 | Status: SHIPPED | OUTPATIENT
Start: 2019-06-09

## 2019-06-09 RX ORDER — HYDROCODONE BITARTRATE AND ACETAMINOPHEN 5; 325 MG/1; MG/1
1 TABLET ORAL
Qty: 10 TAB | Refills: 0 | Status: SHIPPED | OUTPATIENT
Start: 2019-06-09 | End: 2019-06-12

## 2019-06-09 RX ORDER — MORPHINE SULFATE 2 MG/ML
2 INJECTION, SOLUTION INTRAMUSCULAR; INTRAVENOUS ONCE
Status: COMPLETED | OUTPATIENT
Start: 2019-06-09 | End: 2019-06-09

## 2019-06-09 RX ORDER — METRONIDAZOLE 500 MG/1
500 TABLET ORAL 2 TIMES DAILY
Qty: 14 TAB | Refills: 0 | Status: SHIPPED | OUTPATIENT
Start: 2019-06-09 | End: 2019-06-16

## 2019-06-09 RX ADMIN — ONDANSETRON 4 MG: 2 INJECTION INTRAMUSCULAR; INTRAVENOUS at 10:46

## 2019-06-09 RX ADMIN — SODIUM CHLORIDE 1000 ML: 900 INJECTION, SOLUTION INTRAVENOUS at 11:46

## 2019-06-09 RX ADMIN — MORPHINE SULFATE 2 MG: 2 INJECTION, SOLUTION INTRAMUSCULAR; INTRAVENOUS at 10:46

## 2019-06-09 RX ADMIN — IOPAMIDOL 98 ML: 612 INJECTION, SOLUTION INTRAVENOUS at 13:10

## 2019-06-09 RX ADMIN — DIATRIZOATE MEGLUMINE AND DIATRIZOATE SODIUM 30 ML: 660; 100 LIQUID ORAL; RECTAL at 10:58

## 2019-06-09 RX ADMIN — ONDANSETRON 4 MG: 2 INJECTION INTRAMUSCULAR; INTRAVENOUS at 13:28

## 2019-06-09 RX ADMIN — SODIUM CHLORIDE 566 ML: 900 INJECTION, SOLUTION INTRAVENOUS at 13:28

## 2019-06-09 NOTE — ED TRIAGE NOTES
Pt states she has diverticulitis. Flare up started yesterday. Has abd pain, nausea, diarrhea that is \"just pus\", Is scheduled for colectomy 7/12. States spoke to Dr. Florecita Alfred today and was told to come to the ED.  States her daughter drove her here

## 2019-06-09 NOTE — ED PROVIDER NOTES
EMERGENCY DEPARTMENT HISTORY AND PHYSICAL EXAM    Date: 6/9/2019  Patient Name: Harvinder Arias    History of Presenting Illness     Chief Complaint   Patient presents with    Abdominal Pain    Nausea    Diarrhea         History Provided By: Patient    Chief Complaint: abdominal pain        Additional History (Context): Harvinder Arias is a 79 y.o. female with diverticulitis, depression who presents with left lower quadrant abdominal pain, nausea, vomiting and diarrhea onset yesterday worse overnight and into this morning. Patient also reports chills. She states she was unable to sleep all night and she was up multiple times to the bathroom but all that was coming out was mainly mucus. Patient reports she believes this is a diverticulitis flareup. She is scheduled for colectomy July 12 by Dr. Clarisa Tran. Patient denies fever, hematochezia, melena, hematemesis, dizziness, chest pain or any other complaints or symptoms. She states her last CAT scan was mid April. She has not been on any antibiotics recently. She ate Jell-O and applesauce yesterday and drink a little bit of water. Has not eaten this morning. PCP: Aga Ugalde NP    Current Facility-Administered Medications   Medication Dose Route Frequency Provider Last Rate Last Dose    sodium chloride (NS) flush 5-10 mL  5-10 mL IntraVENous PRN Janie Ga PA         Current Outpatient Medications   Medication Sig Dispense Refill    ondansetron (ZOFRAN ODT) 4 mg disintegrating tablet Take 1 Tab by mouth every eight (8) hours as needed for Nausea. 14 Tab 0    metroNIDAZOLE (FLAGYL) 500 mg tablet Take 1 Tab by mouth two (2) times a day for 7 days. 14 Tab 0    ciprofloxacin HCl (CIPRO) 500 mg tablet Take 1 Tab by mouth two (2) times a day for 7 days. 14 Tab 0    HYDROcodone-acetaminophen (NORCO) 5-325 mg per tablet Take 1 Tab by mouth every four (4) hours as needed for Pain for up to 3 days. Max Daily Amount: 6 Tabs.  10 Tab 0    hydrocortisone (ANUSOL-HC) 2.5 % rectal cream Insert  into rectum four (4) times daily. 30 g 0    triamcinolone acetonide (KENALOG) 0.1 % topical cream Apply  to affected area two (2) times a day. use thin layer for up to 2 weeks. 15 g 1    cetirizine (ZYRTEC) 10 mg tablet Take 1 Tab by mouth daily. (Patient taking differently: Take 10 mg by mouth daily as needed.) 30 Tab 3    ALPRAZolam (XANAX) 0.25 mg tablet Take 1 Tab by mouth daily as needed for Anxiety. 5 Tab 0       Past History     Past Medical History:  Past Medical History:   Diagnosis Date    Depression     Diverticulitis        Past Surgical History:  Past Surgical History:   Procedure Laterality Date    HX COLONOSCOPY  2007; 2/2019    neg; 2nd colo showed diverticula    HX OTHER SURGICAL      Right arm       Family History:  Family History   Problem Relation Age of Onset    Hypertension Father     Heart Disease Father     No Known Problems Mother     Heart Attack Brother         age 48    Heart Attack Paternal Grandfather         age 72    Heart Attack Brother         age 62       Social History:  Social History     Tobacco Use    Smoking status: Never Smoker    Smokeless tobacco: Never Used   Substance Use Topics    Alcohol use: Yes     Comment: occassionally    Drug use: No       Allergies: Allergies   Allergen Reactions    Septra [Sulfamethoprim Ds] Rash         Review of Systems   Review of Systems   Constitutional: Positive for chills. Negative for fever. Respiratory: Negative. Cardiovascular: Negative. Gastrointestinal: Positive for abdominal pain, diarrhea, nausea and vomiting. Genitourinary: Negative. Neurological: Negative. All other systems reviewed and are negative.     All Other Systems Negative  Physical Exam     Vitals:    06/09/19 1320 06/09/19 1331 06/09/19 1332 06/09/19 1342   BP: 118/57 129/58     Pulse:       Resp:       Temp:       SpO2:  95% 98% 96%   Weight:       Height:         Physical Exam Constitutional: She is oriented to person, place, and time. She appears well-developed and well-nourished. No distress. HENT:   Head: Normocephalic and atraumatic. Mouth/Throat: Oropharynx is clear and moist.   Eyes: Conjunctivae are normal.   Neck: Normal range of motion. Neck supple. Cardiovascular: Normal rate and regular rhythm. Pulmonary/Chest: Effort normal and breath sounds normal.   Abdominal: Soft. Bowel sounds are normal. She exhibits no distension. There is tenderness in the right lower quadrant and left lower quadrant. There is guarding. There is no rigidity, no rebound, no CVA tenderness, no tenderness at McBurney's point and negative Salinas's sign. Musculoskeletal: Normal range of motion. Neurological: She is alert and oriented to person, place, and time. Skin: Skin is warm and dry. She is not diaphoretic. Psychiatric: She has a normal mood and affect. Diagnostic Study Results     Labs -     Recent Results (from the past 12 hour(s))   CBC WITH AUTOMATED DIFF    Collection Time: 06/09/19 10:44 AM   Result Value Ref Range    WBC 11.3 4.6 - 13.2 K/uL    RBC 4.39 4.20 - 5.30 M/uL    HGB 13.8 12.0 - 16.0 g/dL    HCT 40.0 35.0 - 45.0 %    MCV 91.1 74.0 - 97.0 FL    MCH 31.4 24.0 - 34.0 PG    MCHC 34.5 31.0 - 37.0 g/dL    RDW 12.5 11.6 - 14.5 %    PLATELET 673 306 - 459 K/uL    MPV 10.6 9.2 - 11.8 FL    NEUTROPHILS 74 42 - 75 %    LYMPHOCYTES 12 (L) 20 - 51 %    MONOCYTES 14 (H) 2 - 9 %    EOSINOPHILS 0 0 - 5 %    BASOPHILS 0 0 - 3 %    ABS. NEUTROPHILS 8.3 (H) 1.8 - 8.0 K/UL    ABS. LYMPHOCYTES 1.4 0.8 - 3.5 K/UL    ABS. MONOCYTES 1.6 (H) 0 - 1.0 K/UL    ABS. EOSINOPHILS 0.0 0.0 - 0.4 K/UL    ABS.  BASOPHILS 0.0 0.0 - 0.06 K/UL    DF MANUAL      PLATELET COMMENTS ADEQUATE PLATELETS      RBC COMMENTS NORMOCYTIC, NORMOCHROMIC     METABOLIC PANEL, COMPREHENSIVE    Collection Time: 06/09/19 10:44 AM   Result Value Ref Range    Sodium 136 136 - 145 mmol/L    Potassium 3.7 3.5 - 5.5 mmol/L    Chloride 103 100 - 108 mmol/L    CO2 21 21 - 32 mmol/L    Anion gap 12 3.0 - 18 mmol/L    Glucose 95 74 - 99 mg/dL    BUN 12 7.0 - 18 MG/DL    Creatinine 0.80 0.6 - 1.3 MG/DL    BUN/Creatinine ratio 15 12 - 20      GFR est AA >60 >60 ml/min/1.73m2    GFR est non-AA >60 >60 ml/min/1.73m2    Calcium 9.1 8.5 - 10.1 MG/DL    Bilirubin, total 0.7 0.2 - 1.0 MG/DL    ALT (SGPT) 18 13 - 56 U/L    AST (SGOT) 32 15 - 37 U/L    Alk. phosphatase 75 45 - 117 U/L    Protein, total 7.7 6.4 - 8.2 g/dL    Albumin 3.3 (L) 3.4 - 5.0 g/dL    Globulin 4.4 (H) 2.0 - 4.0 g/dL    A-G Ratio 0.8 0.8 - 1.7     POC LACTIC ACID    Collection Time: 06/09/19 10:47 AM   Result Value Ref Range    Lactic Acid (POC) 2.09 (HH) 0.40 - 2.00 mmol/L   URINALYSIS W/ RFLX MICROSCOPIC    Collection Time: 06/09/19 12:10 PM   Result Value Ref Range    Color YELLOW      Appearance CLOUDY      Specific gravity 1.007 1.005 - 1.030      pH (UA) 5.0 5.0 - 8.0      Protein NEGATIVE  NEG mg/dL    Glucose NEGATIVE  NEG mg/dL    Ketone 15 (A) NEG mg/dL    Bilirubin NEGATIVE  NEG      Blood LARGE (A) NEG      Urobilinogen 0.2 0.2 - 1.0 EU/dL    Nitrites NEGATIVE  NEG      Leukocyte Esterase NEGATIVE  NEG     URINE MICROSCOPIC ONLY    Collection Time: 06/09/19 12:10 PM   Result Value Ref Range    WBC 0 to 3 0 - 4 /hpf    RBC 11 to 20 0 - 5 /hpf    Epithelial cells FEW 0 - 5 /lpf    Bacteria NEGATIVE  NEG /hpf   POC LACTIC ACID    Collection Time: 06/09/19  1:58 PM   Result Value Ref Range    Lactic Acid (POC) 0.99 0.40 - 2.00 mmol/L       Radiologic Studies -   CT ABD PELV W CONT   Final Result   Impression:        Mild wall thickening of the transverse, descending and sigmoid colon. Long   segment of extensive diverticulosis of the sigmoid again noted without focal   pericolonic inflammation in the region of the diverticula. Colitis is favored   over diverticulitis. Stable minimal dilatation of the common bile duct.                     CT Results  (Last 48 hours)               06/09/19 1310  CT ABD PELV W CONT Final result    Impression:  Impression:         Mild wall thickening of the transverse, descending and sigmoid colon. Long   segment of extensive diverticulosis of the sigmoid again noted without focal   pericolonic inflammation in the region of the diverticula. Colitis is favored   over diverticulitis. Stable minimal dilatation of the common bile duct. Narrative:  Description:  CT abdomen and pelvis with IV contrast        TECHNIQUE: [Helically acquired axial] CT imaging of the [abdomen and pelvis]. 98   cc's of Isovue-300 injected intravenously. [Oral contrast also given. Coronal   and sagittal reformations generated and reviewed. All CT scans at this facility are performed using dose optimization technique as   appropriate to a performed exam, to include automated exposure control,   adjustment of the mA and/or kV according to patient size (including appropriate   matching for site-specific examinations), or use of iterative reconstruction   technique. Clinical Indication:  Left lower quadrant pain with history of diverticulitis       Comparison: November 12, 2018. Findings:     CT abdomen: There is some lingular atelectasis. The lung bases are otherwise clear. Base of the heart appears unremarkable. The liver shows reduced attenuation throughout. No focal liver lesion. There is a mildly dilated common bile duct measuring 8 mm in diameter. This is   stable. The gallbladder has a normal appearance by CT. The pancreas has a normal appearance. The spleen has a normal appearance. The adrenal glands appear normal.   The kidneys show symmetric enhancement. No hydronephrosis. There is normal contrast filling of the portal and the splenic veins. The abdominal aorta is nonaneurysmal.   No mesenteric or retroperitoneal adenopathy. No peritoneal free fluid or air.    There is mild mucosal enhancement and some mural edema within the colon. This   appears most pronounced within the transverse, descending and sigmoid colon. CT PELVIS:   The bladder is not well-distended but has a normal appearance otherwise. Again identified is a long segment of the sigmoid colon which shows extensive   diverticulosis and some wall thickening. No pelvic mass, free fluid or adenopathy. Skeleton/soft tissues:   Degenerative disc disease at L5-S1 and L2-L3. No acute osseous findings. CXR Results  (Last 48 hours)    None            Medical Decision Making   I am the first provider for this patient. I reviewed the vital signs, available nursing notes, past medical history, past surgical history, family history and social history. Vital Signs-Reviewed the patient's vital signs. Pulse Oximetry Analysis - 99% on ra    Records Reviewed: Nursing Notes    Procedures:  Procedures    Provider Notes (Medical Decision Making):   Ddx: divertivulitis (perforated, abscess), appendicitis, colitis, dehydration, vs other. Will check labs, hydrate, CT. MALKA Rubio [id number]    CT with colitis to transverse, descending, and sigmoid colon; no diverticulitis, no abscesses. Resutls discussed w/ pt. Will put on cipro and flagyl. Gen surg f/u. MED RECONCILIATION:  Current Facility-Administered Medications   Medication Dose Route Frequency    sodium chloride (NS) flush 5-10 mL  5-10 mL IntraVENous PRN     Current Outpatient Medications   Medication Sig    ondansetron (ZOFRAN ODT) 4 mg disintegrating tablet Take 1 Tab by mouth every eight (8) hours as needed for Nausea.  metroNIDAZOLE (FLAGYL) 500 mg tablet Take 1 Tab by mouth two (2) times a day for 7 days.  ciprofloxacin HCl (CIPRO) 500 mg tablet Take 1 Tab by mouth two (2) times a day for 7 days.  HYDROcodone-acetaminophen (NORCO) 5-325 mg per tablet Take 1 Tab by mouth every four (4) hours as needed for Pain for up to 3 days.  Max Daily Amount: 6 Tabs.    hydrocortisone (ANUSOL-HC) 2.5 % rectal cream Insert  into rectum four (4) times daily.  triamcinolone acetonide (KENALOG) 0.1 % topical cream Apply  to affected area two (2) times a day. use thin layer for up to 2 weeks.  cetirizine (ZYRTEC) 10 mg tablet Take 1 Tab by mouth daily. (Patient taking differently: Take 10 mg by mouth daily as needed.)    ALPRAZolam (XANAX) 0.25 mg tablet Take 1 Tab by mouth daily as needed for Anxiety. Disposition:  home    DISCHARGE NOTE:     Pt has been reexamined. Patient has no new complaints, changes, or physical findings. Care plan outlined and precautions discussed. Results of labs and CT were reviewed with the patient. All medications were reviewed with the patient; will d/c home with cipro, flagyl, zofran. All of pt's questions and concerns were addressed. Patient was instructed and agrees to follow up with pcp and gen surg, as well as to return to the ED upon further deterioration. Patient is ready to go home. Follow-up Information     Follow up With Specialties Details Why Contact Info    Elijah Liu MD Surgery  For follow-up [address]  [phone]            Current Discharge Medication List      START taking these medications    Details   metroNIDAZOLE (FLAGYL) 500 mg tablet Take 1 Tab by mouth two (2) times a day for 7 days. Qty: 14 Tab, Refills: 0      ciprofloxacin HCl (CIPRO) 500 mg tablet Take 1 Tab by mouth two (2) times a day for 7 days. Qty: 14 Tab, Refills: 0      HYDROcodone-acetaminophen (NORCO) 5-325 mg per tablet Take 1 Tab by mouth every four (4) hours as needed for Pain for up to 3 days. Max Daily Amount: 6 Tabs. Qty: 10 Tab, Refills: 0    Associated Diagnoses: Colitis, acute         CONTINUE these medications which have CHANGED    Details   ondansetron (ZOFRAN ODT) 4 mg disintegrating tablet Take 1 Tab by mouth every eight (8) hours as needed for Nausea.   Qty: 14 Tab, Refills: 0 CONTINUE these medications which have NOT CHANGED    Details   hydrocortisone (ANUSOL-HC) 2.5 % rectal cream Insert  into rectum four (4) times daily. Qty: 30 g, Refills: 0    Associated Diagnoses: Hemorrhoids, unspecified hemorrhoid type      cetirizine (ZYRTEC) 10 mg tablet Take 1 Tab by mouth daily. Qty: 30 Tab, Refills: 3    Associated Diagnoses: Acute effusion of left ear      ALPRAZolam (XANAX) 0.25 mg tablet Take 1 Tab by mouth daily as needed for Anxiety. Qty: 5 Tab, Refills: 0    Comments: Air travel  Associated Diagnoses: Situational anxiety               Diagnosis     Clinical Impression:   1.  Colitis, acute

## 2019-06-09 NOTE — ED NOTES
Verbal shift change report given to Michael Blank RN (oncoming nurse) by Palma Koenig RN (offgoing nurse). Report included the following information SBAR.

## 2019-06-09 NOTE — ED NOTES
Patient states mild nausea. Denies need for nausea medications. Rates abdominal pain at 2/10. Actively consuming Gastroview drink at present.

## 2019-06-09 NOTE — ED NOTES
Patient resting in bed. No s/sx of distress. Lactic Acid repeat completed and provider aware of lactic results of 0.99. Patient inquires as to whether abx are going to be given. Per provider, abx on hold until results of CT known. Patient made aware. Denies other needs or concerns. Denies nausea. Warm blankets given for comfort.

## 2019-06-09 NOTE — DISCHARGE INSTRUCTIONS
Patient Education     Colitis: Care Instructions  Your Care Instructions  Colitis is the medical term for swelling (inflammation) of the intestine. It can be caused by different things, such as an infection or loss of blood flow in the intestine. Other causes are problems like Crohn's disease or ulcerative colitis. Symptoms may include fever, diarrhea that may be bloody, or belly pain. Sometimes symptoms go away without treatment. But you may need treatment or more tests, such as blood tests or a stool test. Or you may need imaging tests like a CT scan or a colonoscopy. In some cases, the doctor may want to test a sample of tissue from the intestine. This test is called a biopsy. The doctor has checked you carefully, but problems can develop later. If you notice any problems or new symptoms, get medical treatment right away. Follow-up care is a key part of your treatment and safety. Be sure to make and go to all appointments, and call your doctor if you are having problems. It's also a good idea to know your test results and keep a list of the medicines you take. How can you care for yourself at home? · Rest until you feel better. · Your doctor may recommend that you eat bland foods. These include rice, dry toast or crackers, bananas, and applesauce. · To prevent dehydration, drink plenty of fluids. Choose water and other caffeine-free clear liquids until you feel better. If you have kidney, heart, or liver disease and have to limit fluids, talk with your doctor before you increase the amount of fluids you drink. · Be safe with medicines. Take your medicines exactly as prescribed. Call your doctor if you think you are having a problem with your medicine. You will get more details on the specific medicines your doctor prescribes. When should you call for help? Call 911 anytime you think you may need emergency care. For example, call if:  · You passed out (lost consciousness).   · You vomit blood or what looks like coffee grounds. · Your stools are maroon or very bloody. Call your doctor now or seek immediate medical care if:  · You have new or worse pain. · You have a new or higher fever. · You have new or worse symptoms. · You cannot keep fluids or medicines down. Watch closely for changes in your health, and be sure to contact your doctor if:  · You do not get better as expected. Where can you learn more? Go to [employer]  Enter [id number] in the search box to learn more about \"Colitis: Care Instructions. \"   © 1175-0637 [employer], [employer]. Care instructions adapted under license by [facility] [employer] (which disclaims liability or warranty for this information). This care instruction is for use with your licensed healthcare professional. If you have questions about a medical condition or this instruction, always ask your healthcare professional. [address] any warranty or liability for your use of this information.   Content Version: 54.9.200529; Current as of: November 20, 2015

## 2019-06-11 ENCOUNTER — APPOINTMENT (OUTPATIENT)
Dept: PHYSICAL THERAPY | Age: 69
End: 2019-06-11

## 2019-06-13 ENCOUNTER — OFFICE VISIT (OUTPATIENT)
Dept: SURGERY | Age: 69
End: 2019-06-13

## 2019-06-13 VITALS
HEIGHT: 60 IN | RESPIRATION RATE: 16 BRPM | OXYGEN SATURATION: 95 % | SYSTOLIC BLOOD PRESSURE: 110 MMHG | WEIGHT: 116 LBS | BODY MASS INDEX: 22.78 KG/M2 | DIASTOLIC BLOOD PRESSURE: 68 MMHG | HEART RATE: 108 BPM | TEMPERATURE: 97.7 F

## 2019-06-13 DIAGNOSIS — A04.72 C. DIFFICILE COLITIS: Primary | ICD-10-CM

## 2019-06-13 RX ORDER — VANCOMYCIN HYDROCHLORIDE 125 MG/1
125 CAPSULE ORAL 4 TIMES DAILY
Qty: 40 CAP | Refills: 0 | Status: SHIPPED | OUTPATIENT
Start: 2019-06-13 | End: 2019-06-23

## 2019-06-13 NOTE — PROGRESS NOTES
Subjective: Developed abdominal pain and diarrhea. Went to the ED. CT scan done and patient sent home on Cipro and Flagyl for colitis. Past medical history and ROS were reviewed and unchanged. Abdomen: Soft, nontender nondistended    CT personally visualized, consistent with diffuse colitis    Assessment / Plan    Likely C. difficile colitis  We will have her send off stool studies for C. difficile  We will start on oral vancomycin and stop Cipro and Flagyl if it is positive  Follow-up in 2 weeks    A total of 15 minutes was spent with the patient, with >50% of time spent on counseling and coordination of care. The diagnoses and plan were discussed with patient. All questions answered. Plan of care agreed to by all concerned.

## 2019-06-13 NOTE — PROGRESS NOTES
[facility] - Angeles Smart  [address] [facility]  [phone] fax    Occupational Therapy Discharge Summary  Patient name: Anna Marie Yoder Start of Care: 19   Referral source: Serenity Sommer, * : 1950   Medical/Treatment Diagnosis: Pain in right hand [M79.641]  Payor: Abi Rhodes / Plan: [place] MEDICARE PART A & B / Product Type: Medicare /  Onset Date:2018     Prior Hospitalization: see medical history Provider#: [id number]   Medications: Verified on Patient Summary List    Comorbidities: Diverticulitis  Prior Level of Function:I self care home care driving, retired, community volunteer    Visits from [facility] Care: 9    Missed Visits: 4    Reporting Period : 19 to 19    Summary of Care:Patient was seen for modalities, therapeutic exercises and activities to improve hand function. S/he has HEP. She self discharged due to inability to drive following unrelated medical procedure.     Goal:1.  Patient will be independent in home exercise program for digit ROM to improve grasp   Status at last note/certification:Did not have  Status at discharge: met    Goal:2.  Patient to be independent in edema management strategies  Status at last note/certification:unaware  Status at discharge: met    Nadia Donis will be able to open jars and bottles with task modifications  Status at last note/certification:Unable  Status at discharge:  met    LTGs  Goal:1.  Patient will increase  strength in right hand by 20 pounds to increase ease of opening jars   Status at last note/certification: 15  Status at discharge: not met  20    Nadia Donis will be able to close hand tightly to secure coins and small items in palm  Status at last note/certification:Unable  Status at discharge: not met progressing, able to do at end of session    Goal:3..  Patient will increase all pinches at least 2-3# for opening packages   Status at last note/certification:See eval  Status at discharge: not met Progressing, improved 1#    ASSESSMENT/Changes in Function: Patient was progressing well at time of self discharge due to inability to drive following unrelated medical procedure.       ASSESSMENT/RECOMMENDATIONS:  [x]Discontinue therapy: [x]Patient has reached or is progressing toward set goals      []Patient is non-compliant or has abdicated      []Due to lack of appreciable progress towards set goals    TEE Sanchez/L 6/13/2019 1:37 PM

## 2019-06-13 NOTE — PROGRESS NOTES
Moezard Cranker presents today for   Chief Complaint   Patient presents with    Follow-up     chronic diverticuitis, surgery scheduled for 7/12/19     Is someone accompanying this pt? no    Is the patient using any DME equipment during OV? no    Coordination of Care:  1. Have you been to the ER, urgent care clinic since your last visit? Hospitalized since your last visit? Yes, Sunday for diarrhea, dx with colitis, put on cipro and flagyl    2. Have you seen or consulted any other health care providers outside of the [address] [place] [address] since your last visit? Include any pap smears or colon screening.  no

## 2019-06-14 ENCOUNTER — APPOINTMENT (OUTPATIENT)
Dept: PHYSICAL THERAPY | Age: 69
End: 2019-06-14

## 2019-06-14 ENCOUNTER — HOSPITAL ENCOUNTER (OUTPATIENT)
Dept: LAB | Age: 69
Discharge: HOME | End: 2019-06-14
Payer: MEDICARE

## 2019-06-14 DIAGNOSIS — A04.72 C. DIFFICILE COLITIS: ICD-10-CM

## 2019-06-14 LAB
C DIFF GDH STL QL: NEGATIVE
C DIFF TOX A+B STL QL IA: NEGATIVE
INTERPRETATION: NORMAL

## 2019-06-14 PROCEDURE — 36415 COLL VENOUS BLD VENIPUNCTURE: CPT

## 2019-06-14 PROCEDURE — 87449 NOS EACH ORGANISM AG IA: CPT

## 2019-06-27 ENCOUNTER — OFFICE VISIT (OUTPATIENT)
Dept: SURGERY | Age: 69
End: 2019-06-27

## 2019-06-27 VITALS
RESPIRATION RATE: 16 BRPM | HEART RATE: 118 BPM | SYSTOLIC BLOOD PRESSURE: 127 MMHG | WEIGHT: 112 LBS | HEIGHT: 60 IN | DIASTOLIC BLOOD PRESSURE: 85 MMHG | TEMPERATURE: 97.1 F | OXYGEN SATURATION: 97 % | BODY MASS INDEX: 21.99 KG/M2

## 2019-06-27 DIAGNOSIS — K57.32 DIVERTICULITIS OF COLON: Primary | ICD-10-CM

## 2019-06-27 DIAGNOSIS — K52.9 COLITIS: ICD-10-CM

## 2019-06-27 NOTE — PROGRESS NOTES
Subjective: Still passing mucus, pain near resolved. Past medical history and ROS were reviewed and unchanged. Abdomen: Soft, nontender nondistended    Assessment / Plan    Medically refractory diverticulitis  Repeat CT imaging to be sure that the surrounding colon wall thickening is resolved  Schedule robotic sigmoid colectomy    A total of 15 minutes was spent with the patient, with >50% of time spent on counseling and coordination of care. The diagnoses and plan were discussed with patient. All questions answered. Plan of care agreed to by all concerned.

## 2019-06-27 NOTE — PROGRESS NOTES
Liset Israel presents today for   Chief Complaint   Patient presents with    Follow-up     chronic diverticulitis, f/u neg c diff testing, having bm's very frequently, mucous since friday       Is someone accompanying this pt? Friend in waiting room    Is the patient using any DME equipment during OV? no    Coordination of Care:  1. Have you been to the ER, urgent care clinic since your last visit? Hospitalized since your last visit? no    2. Have you seen or consulted any other health care providers outside of the [address] [place] [address] since your last visit? Include any pap smears or colon screening. Occupational therapy discharged.

## 2019-07-03 ENCOUNTER — HOSPITAL ENCOUNTER (OUTPATIENT)
Dept: LAB | Age: 69
Discharge: HOME | End: 2019-07-03
Payer: MEDICARE

## 2019-07-03 DIAGNOSIS — K57.32 DIVERTICULITIS OF COLON: ICD-10-CM

## 2019-07-03 LAB
ATRIAL RATE: 95 BPM
CALCULATED P AXIS, ECG09: 59 DEGREES
CALCULATED R AXIS, ECG10: 7 DEGREES
CALCULATED T AXIS, ECG11: 3 DEGREES
DIAGNOSIS, 93000: NORMAL
P-R INTERVAL, ECG05: 160 MS
Q-T INTERVAL, ECG07: 382 MS
QRS DURATION, ECG06: 68 MS
QTC CALCULATION (BEZET), ECG08: 480 MS
VENTRICULAR RATE, ECG03: 95 BPM

## 2019-07-03 PROCEDURE — 93005 ELECTROCARDIOGRAM TRACING: CPT

## 2019-07-07 ENCOUNTER — HOSPITAL ENCOUNTER (OUTPATIENT)
Dept: CT IMAGING | Age: 69
Discharge: HOME | End: 2019-07-07
Attending: COLON & RECTAL SURGERY
Payer: MEDICARE

## 2019-07-07 DIAGNOSIS — K57.32 DIVERTICULITIS OF COLON: ICD-10-CM

## 2019-07-07 DIAGNOSIS — K52.9 COLITIS: ICD-10-CM

## 2019-07-07 PROCEDURE — 74177 CT ABD & PELVIS W/CONTRAST: CPT

## 2019-07-07 PROCEDURE — 74011636320 HC RX REV CODE- 636/320: Performed by: COLON & RECTAL SURGERY

## 2019-07-07 RX ADMIN — IOPAMIDOL 95 ML: 612 INJECTION, SOLUTION INTRAVENOUS at 11:41

## 2019-07-09 ENCOUNTER — HOSPITAL ENCOUNTER (OUTPATIENT)
Dept: PREADMISSION TESTING | Age: 69
Discharge: HOME | End: 2019-07-09
Payer: MEDICARE

## 2019-07-09 DIAGNOSIS — K57.32 DIVERTICULITIS OF COLON: ICD-10-CM

## 2019-07-09 LAB
ABO + RH BLD: NORMAL
ALBUMIN SERPL-MCNC: 3.5 G/DL (ref 3.4–5)
ANION GAP SERPL CALC-SCNC: 8 MMOL/L (ref 3–18)
APTT PPP: 25.6 SEC (ref 23–36.4)
BASOPHILS # BLD: 0 K/UL (ref 0–0.1)
BASOPHILS NFR BLD: 0 % (ref 0–2)
BLOOD GROUP ANTIBODIES SERPL: NORMAL
BUN SERPL-MCNC: 9 MG/DL (ref 7–18)
BUN/CREAT SERPL: 12 (ref 12–20)
CALCIUM SERPL-MCNC: 9.3 MG/DL (ref 8.5–10.1)
CHLORIDE SERPL-SCNC: 106 MMOL/L (ref 100–108)
CO2 SERPL-SCNC: 28 MMOL/L (ref 21–32)
CREAT SERPL-MCNC: 0.78 MG/DL (ref 0.6–1.3)
DIFFERENTIAL METHOD BLD: ABNORMAL
EOSINOPHIL # BLD: 0.1 K/UL (ref 0–0.4)
EOSINOPHIL NFR BLD: 1 % (ref 0–5)
ERYTHROCYTE [DISTWIDTH] IN BLOOD BY AUTOMATED COUNT: 12.7 % (ref 11.6–14.5)
GLUCOSE SERPL-MCNC: 118 MG/DL (ref 74–99)
HCT VFR BLD AUTO: 37.2 % (ref 35–45)
HGB BLD-MCNC: 12.7 G/DL (ref 12–16)
INR PPP: 1 (ref 0.8–1.2)
LYMPHOCYTES # BLD: 1.1 K/UL (ref 0.9–3.6)
LYMPHOCYTES NFR BLD: 14 % (ref 21–52)
MCH RBC QN AUTO: 30.8 PG (ref 24–34)
MCHC RBC AUTO-ENTMCNC: 34.1 G/DL (ref 31–37)
MCV RBC AUTO: 90.1 FL (ref 74–97)
MONOCYTES # BLD: 0.5 K/UL (ref 0.05–1.2)
MONOCYTES NFR BLD: 6 % (ref 3–10)
NEUTS SEG # BLD: 6.7 K/UL (ref 1.8–8)
NEUTS SEG NFR BLD: 79 % (ref 40–73)
PLATELET # BLD AUTO: 344 K/UL (ref 135–420)
PMV BLD AUTO: 10 FL (ref 9.2–11.8)
POTASSIUM SERPL-SCNC: 3.7 MMOL/L (ref 3.5–5.5)
PROTHROMBIN TIME: 12.6 SEC (ref 11.5–15.2)
RBC # BLD AUTO: 4.13 M/UL (ref 4.2–5.3)
SODIUM SERPL-SCNC: 142 MMOL/L (ref 136–145)
SPECIMEN EXP DATE BLD: NORMAL
WBC # BLD AUTO: 8.5 K/UL (ref 4.6–13.2)

## 2019-07-09 PROCEDURE — 85610 PROTHROMBIN TIME: CPT

## 2019-07-09 PROCEDURE — 82040 ASSAY OF SERUM ALBUMIN: CPT

## 2019-07-09 PROCEDURE — 36415 COLL VENOUS BLD VENIPUNCTURE: CPT

## 2019-07-09 PROCEDURE — 86900 BLOOD TYPING SEROLOGIC ABO: CPT

## 2019-07-09 PROCEDURE — 80048 BASIC METABOLIC PNL TOTAL CA: CPT

## 2019-07-09 PROCEDURE — 85730 THROMBOPLASTIN TIME PARTIAL: CPT

## 2019-07-09 PROCEDURE — 85025 COMPLETE CBC W/AUTO DIFF WBC: CPT

## 2019-07-15 ENCOUNTER — ANESTHESIA EVENT (OUTPATIENT)
Dept: SURGERY | Age: 69
DRG: 329 | End: 2019-07-15
Payer: MEDICARE

## 2019-07-16 ENCOUNTER — HOSPITAL ENCOUNTER (INPATIENT)
Age: 69
LOS: 3 days | Discharge: HOME | DRG: 329 | End: 2019-07-19
Attending: COLON & RECTAL SURGERY | Admitting: COLON & RECTAL SURGERY
Payer: MEDICARE

## 2019-07-16 ENCOUNTER — ANESTHESIA (OUTPATIENT)
Dept: SURGERY | Age: 69
DRG: 329 | End: 2019-07-16
Payer: MEDICARE

## 2019-07-16 DIAGNOSIS — K57.92 DIVERTICULITIS: Primary | ICD-10-CM

## 2019-07-16 PROCEDURE — 74011000254 HC RX REV CODE- 254

## 2019-07-16 PROCEDURE — 77030035048 HC TRCR ENDOSC OPTCL COVD -B: Performed by: COLON & RECTAL SURGERY

## 2019-07-16 PROCEDURE — 77030028754 HC RCTRCTR LAPSCP ALX DSP AMR -B: Performed by: COLON & RECTAL SURGERY

## 2019-07-16 PROCEDURE — 77030032522 HC SHT STPL PK ENDOWR INTU -B: Performed by: COLON & RECTAL SURGERY

## 2019-07-16 PROCEDURE — 74011250636 HC RX REV CODE- 250/636

## 2019-07-16 PROCEDURE — 74011000250 HC RX REV CODE- 250: Performed by: COLON & RECTAL SURGERY

## 2019-07-16 PROCEDURE — 77030037892: Performed by: COLON & RECTAL SURGERY

## 2019-07-16 PROCEDURE — 77030008683 HC TU ET CUF COVD -A: Performed by: NURSE ANESTHETIST, CERTIFIED REGISTERED

## 2019-07-16 PROCEDURE — 8E0W4CZ ROBOTIC ASSISTED PROCEDURE OF TRUNK REGION, PERCUTANEOUS ENDOSCOPIC APPROACH: ICD-10-PCS | Performed by: COLON & RECTAL SURGERY

## 2019-07-16 PROCEDURE — 74011250636 HC RX REV CODE- 250/636: Performed by: COLON & RECTAL SURGERY

## 2019-07-16 PROCEDURE — 76210000006 HC OR PH I REC 0.5 TO 1 HR: Performed by: COLON & RECTAL SURGERY

## 2019-07-16 PROCEDURE — C1729 CATH, DRAINAGE: HCPCS | Performed by: COLON & RECTAL SURGERY

## 2019-07-16 PROCEDURE — 0DTN4ZZ RESECTION OF SIGMOID COLON, PERCUTANEOUS ENDOSCOPIC APPROACH: ICD-10-PCS | Performed by: COLON & RECTAL SURGERY

## 2019-07-16 PROCEDURE — 77030016151 HC PROTCTR LNS DFOG COVD -B: Performed by: COLON & RECTAL SURGERY

## 2019-07-16 PROCEDURE — 77030035278 HC STPLR SEAL ENDOWR INTU -B: Performed by: COLON & RECTAL SURGERY

## 2019-07-16 PROCEDURE — 77030019605: Performed by: COLON & RECTAL SURGERY

## 2019-07-16 PROCEDURE — 77030003028 HC SUT VCRL J&J -A: Performed by: COLON & RECTAL SURGERY

## 2019-07-16 PROCEDURE — 76010000884 HC OR TIME 6 TO 6.5HR INTENSV - TIER 2: Performed by: COLON & RECTAL SURGERY

## 2019-07-16 PROCEDURE — 77030005546 HC CATH URETH FOL 3W BARD -A: Performed by: COLON & RECTAL SURGERY

## 2019-07-16 PROCEDURE — 77030035277 HC OBTRTR BLDELSS DISP INTU -B: Performed by: COLON & RECTAL SURGERY

## 2019-07-16 PROCEDURE — 77030012012 HC AIRWY OP SFT TELE -A: Performed by: NURSE ANESTHETIST, CERTIFIED REGISTERED

## 2019-07-16 PROCEDURE — 77030032523 HC RELD STPL PK ENDORS INTU -C: Performed by: COLON & RECTAL SURGERY

## 2019-07-16 PROCEDURE — 88307 TISSUE EXAM BY PATHOLOGIST: CPT

## 2019-07-16 PROCEDURE — 77030018846 HC SOL IRR STRL H20 ICUM -A: Performed by: COLON & RECTAL SURGERY

## 2019-07-16 PROCEDURE — 77030034696 HC CATH URETH FOL 2W BARD -A: Performed by: COLON & RECTAL SURGERY

## 2019-07-16 PROCEDURE — 77030018836 HC SOL IRR NACL ICUM -A: Performed by: COLON & RECTAL SURGERY

## 2019-07-16 PROCEDURE — 77030032490 HC SLV COMPR SCD KNE COVD -B: Performed by: COLON & RECTAL SURGERY

## 2019-07-16 PROCEDURE — 77030035489 HC REDUCR CANN ENDOWR INTU -C: Performed by: COLON & RECTAL SURGERY

## 2019-07-16 PROCEDURE — 0DBP4ZZ EXCISION OF RECTUM, PERCUTANEOUS ENDOSCOPIC APPROACH: ICD-10-PCS | Performed by: COLON & RECTAL SURGERY

## 2019-07-16 PROCEDURE — 77030002933 HC SUT MCRYL J&J -A: Performed by: COLON & RECTAL SURGERY

## 2019-07-16 PROCEDURE — 65270000029 HC RM PRIVATE

## 2019-07-16 PROCEDURE — 74011000250 HC RX REV CODE- 250: Performed by: NURSE ANESTHETIST, CERTIFIED REGISTERED

## 2019-07-16 PROCEDURE — 77030011296 HC FCPS GRSP ENDOSC J&J -B: Performed by: COLON & RECTAL SURGERY

## 2019-07-16 PROCEDURE — 77030002966 HC SUT PDS J&J -A: Performed by: COLON & RECTAL SURGERY

## 2019-07-16 PROCEDURE — 77030035279 HC SEAL VSL ENDOWR XI INTU -I2: Performed by: COLON & RECTAL SURGERY

## 2019-07-16 PROCEDURE — 74011000250 HC RX REV CODE- 250

## 2019-07-16 PROCEDURE — 77030020703 HC SEAL CANN DISP INTU -B: Performed by: COLON & RECTAL SURGERY

## 2019-07-16 PROCEDURE — 76060000043 HC ANESTHESIA 6 TO 6.5 HR: Performed by: COLON & RECTAL SURGERY

## 2019-07-16 PROCEDURE — 77030025303 HC STPLR ENDOSC J&J -G: Performed by: COLON & RECTAL SURGERY

## 2019-07-16 PROCEDURE — 77030008565 HC TBNG SUC IRR ERBE -B: Performed by: COLON & RECTAL SURGERY

## 2019-07-16 PROCEDURE — 77030020291 HC FLEXSEAL FMS BMS -C: Performed by: COLON & RECTAL SURGERY

## 2019-07-16 PROCEDURE — 77030031139 HC SUT VCRL2 J&J -A: Performed by: COLON & RECTAL SURGERY

## 2019-07-16 PROCEDURE — 77030034850: Performed by: COLON & RECTAL SURGERY

## 2019-07-16 PROCEDURE — 77030013079 HC BLNKT BAIR HGGR 3M -A: Performed by: NURSE ANESTHETIST, CERTIFIED REGISTERED

## 2019-07-16 PROCEDURE — 77030026438 HC STYL ET INTUB CARD -A: Performed by: NURSE ANESTHETIST, CERTIFIED REGISTERED

## 2019-07-16 PROCEDURE — 74011250637 HC RX REV CODE- 250/637: Performed by: COLON & RECTAL SURGERY

## 2019-07-16 PROCEDURE — 77030003580 HC NDL INSUF VERES J&J -B: Performed by: COLON & RECTAL SURGERY

## 2019-07-16 PROCEDURE — 74011250636 HC RX REV CODE- 250/636: Performed by: NURSE ANESTHETIST, CERTIFIED REGISTERED

## 2019-07-16 PROCEDURE — 77030022704 HC SUT VLOC COVD -B: Performed by: COLON & RECTAL SURGERY

## 2019-07-16 RX ORDER — ONDANSETRON 2 MG/ML
4 INJECTION INTRAMUSCULAR; INTRAVENOUS
Status: DISCONTINUED | OUTPATIENT
Start: 2019-07-16 | End: 2019-07-19 | Stop reason: HOSPADM

## 2019-07-16 RX ORDER — ACETAMINOPHEN 500 MG
1000 TABLET ORAL ONCE
Status: COMPLETED | OUTPATIENT
Start: 2019-07-16 | End: 2019-07-16

## 2019-07-16 RX ORDER — FENTANYL CITRATE 50 UG/ML
50 INJECTION, SOLUTION INTRAMUSCULAR; INTRAVENOUS
Status: DISCONTINUED | OUTPATIENT
Start: 2019-07-16 | End: 2019-07-16 | Stop reason: HOSPADM

## 2019-07-16 RX ORDER — ONDANSETRON 2 MG/ML
4 INJECTION INTRAMUSCULAR; INTRAVENOUS ONCE
Status: DISCONTINUED | OUTPATIENT
Start: 2019-07-16 | End: 2019-07-16 | Stop reason: HOSPADM

## 2019-07-16 RX ORDER — GLYCOPYRROLATE 0.2 MG/ML
INJECTION INTRAMUSCULAR; INTRAVENOUS AS NEEDED
Status: DISCONTINUED | OUTPATIENT
Start: 2019-07-16 | End: 2019-07-16 | Stop reason: HOSPADM

## 2019-07-16 RX ORDER — DEXTROSE 50 % IN WATER (D50W) INTRAVENOUS SYRINGE
25-50 AS NEEDED
Status: DISCONTINUED | OUTPATIENT
Start: 2019-07-16 | End: 2019-07-16 | Stop reason: SDUPTHER

## 2019-07-16 RX ORDER — ROCURONIUM BROMIDE 10 MG/ML
INJECTION, SOLUTION INTRAVENOUS AS NEEDED
Status: DISCONTINUED | OUTPATIENT
Start: 2019-07-16 | End: 2019-07-16 | Stop reason: HOSPADM

## 2019-07-16 RX ORDER — LABETALOL HYDROCHLORIDE 5 MG/ML
INJECTION, SOLUTION INTRAVENOUS AS NEEDED
Status: DISCONTINUED | OUTPATIENT
Start: 2019-07-16 | End: 2019-07-16 | Stop reason: HOSPADM

## 2019-07-16 RX ORDER — SODIUM CHLORIDE 0.9 % (FLUSH) 0.9 %
5-40 SYRINGE (ML) INJECTION EVERY 8 HOURS
Status: DISCONTINUED | OUTPATIENT
Start: 2019-07-16 | End: 2019-07-16 | Stop reason: HOSPADM

## 2019-07-16 RX ORDER — DIPHENHYDRAMINE HYDROCHLORIDE 50 MG/ML
25 INJECTION, SOLUTION INTRAMUSCULAR; INTRAVENOUS
Status: DISCONTINUED | OUTPATIENT
Start: 2019-07-16 | End: 2019-07-19 | Stop reason: HOSPADM

## 2019-07-16 RX ORDER — SODIUM CHLORIDE 0.9 % (FLUSH) 0.9 %
5-40 SYRINGE (ML) INJECTION AS NEEDED
Status: DISCONTINUED | OUTPATIENT
Start: 2019-07-16 | End: 2019-07-16 | Stop reason: HOSPADM

## 2019-07-16 RX ORDER — PHENYLEPHRINE HCL IN 0.9% NACL 1 MG/10 ML
SYRINGE (ML) INTRAVENOUS AS NEEDED
Status: DISCONTINUED | OUTPATIENT
Start: 2019-07-16 | End: 2019-07-16 | Stop reason: HOSPADM

## 2019-07-16 RX ORDER — SODIUM CHLORIDE 9 MG/ML
INJECTION, SOLUTION INTRAVENOUS
Status: DISCONTINUED | OUTPATIENT
Start: 2019-07-16 | End: 2019-07-16 | Stop reason: HOSPADM

## 2019-07-16 RX ORDER — SODIUM CHLORIDE, SODIUM LACTATE, POTASSIUM CHLORIDE, CALCIUM CHLORIDE 600; 310; 30; 20 MG/100ML; MG/100ML; MG/100ML; MG/100ML
75 INJECTION, SOLUTION INTRAVENOUS CONTINUOUS
Status: DISCONTINUED | OUTPATIENT
Start: 2019-07-16 | End: 2019-07-17

## 2019-07-16 RX ORDER — SODIUM CHLORIDE, SODIUM LACTATE, POTASSIUM CHLORIDE, CALCIUM CHLORIDE 600; 310; 30; 20 MG/100ML; MG/100ML; MG/100ML; MG/100ML
INJECTION, SOLUTION INTRAVENOUS
Status: DISCONTINUED | OUTPATIENT
Start: 2019-07-16 | End: 2019-07-16 | Stop reason: HOSPADM

## 2019-07-16 RX ORDER — METRONIDAZOLE 500 MG/100ML
500 INJECTION, SOLUTION INTRAVENOUS EVERY 8 HOURS
Status: COMPLETED | OUTPATIENT
Start: 2019-07-17 | End: 2019-07-18

## 2019-07-16 RX ORDER — MORPHINE SULFATE 2 MG/ML
2 INJECTION, SOLUTION INTRAMUSCULAR; INTRAVENOUS
Status: DISCONTINUED | OUTPATIENT
Start: 2019-07-16 | End: 2019-07-19 | Stop reason: HOSPADM

## 2019-07-16 RX ORDER — EPHEDRINE SULFATE/0.9% NACL/PF 25 MG/5 ML
SYRINGE (ML) INTRAVENOUS AS NEEDED
Status: DISCONTINUED | OUTPATIENT
Start: 2019-07-16 | End: 2019-07-16 | Stop reason: HOSPADM

## 2019-07-16 RX ORDER — HYDROMORPHONE HYDROCHLORIDE 1 MG/ML
0.5 INJECTION, SOLUTION INTRAMUSCULAR; INTRAVENOUS; SUBCUTANEOUS
Status: DISCONTINUED | OUTPATIENT
Start: 2019-07-16 | End: 2019-07-16 | Stop reason: HOSPADM

## 2019-07-16 RX ORDER — LIDOCAINE HYDROCHLORIDE 10 MG/ML
0.1 INJECTION, SOLUTION EPIDURAL; INFILTRATION; INTRACAUDAL; PERINEURAL AS NEEDED
Status: DISCONTINUED | OUTPATIENT
Start: 2019-07-16 | End: 2019-07-16 | Stop reason: HOSPADM

## 2019-07-16 RX ORDER — INSULIN LISPRO 100 [IU]/ML
INJECTION, SOLUTION INTRAVENOUS; SUBCUTANEOUS ONCE
Status: DISCONTINUED | OUTPATIENT
Start: 2019-07-16 | End: 2019-07-16 | Stop reason: HOSPADM

## 2019-07-16 RX ORDER — DEXAMETHASONE SODIUM PHOSPHATE 4 MG/ML
INJECTION, SOLUTION INTRA-ARTICULAR; INTRALESIONAL; INTRAMUSCULAR; INTRAVENOUS; SOFT TISSUE AS NEEDED
Status: DISCONTINUED | OUTPATIENT
Start: 2019-07-16 | End: 2019-07-16 | Stop reason: HOSPADM

## 2019-07-16 RX ORDER — MAGNESIUM SULFATE 100 %
4 CRYSTALS MISCELLANEOUS AS NEEDED
Status: DISCONTINUED | OUTPATIENT
Start: 2019-07-16 | End: 2019-07-16 | Stop reason: SDUPTHER

## 2019-07-16 RX ORDER — METRONIDAZOLE 500 MG/100ML
500 INJECTION, SOLUTION INTRAVENOUS
Status: COMPLETED | OUTPATIENT
Start: 2019-07-16 | End: 2019-07-16

## 2019-07-16 RX ORDER — LIDOCAINE HYDROCHLORIDE 20 MG/ML
INJECTION, SOLUTION EPIDURAL; INFILTRATION; INTRACAUDAL; PERINEURAL AS NEEDED
Status: DISCONTINUED | OUTPATIENT
Start: 2019-07-16 | End: 2019-07-16 | Stop reason: HOSPADM

## 2019-07-16 RX ORDER — CELECOXIB 100 MG/1
200 CAPSULE ORAL ONCE
Status: COMPLETED | OUTPATIENT
Start: 2019-07-16 | End: 2019-07-16

## 2019-07-16 RX ORDER — CEFAZOLIN SODIUM 2 G/50ML
2 SOLUTION INTRAVENOUS
Status: COMPLETED | OUTPATIENT
Start: 2019-07-16 | End: 2019-07-16

## 2019-07-16 RX ORDER — MIDAZOLAM HYDROCHLORIDE 1 MG/ML
INJECTION, SOLUTION INTRAMUSCULAR; INTRAVENOUS AS NEEDED
Status: DISCONTINUED | OUTPATIENT
Start: 2019-07-16 | End: 2019-07-16 | Stop reason: HOSPADM

## 2019-07-16 RX ORDER — ONDANSETRON 2 MG/ML
INJECTION INTRAMUSCULAR; INTRAVENOUS AS NEEDED
Status: DISCONTINUED | OUTPATIENT
Start: 2019-07-16 | End: 2019-07-16 | Stop reason: HOSPADM

## 2019-07-16 RX ORDER — HYDROMORPHONE HYDROCHLORIDE 2 MG/ML
0.5 INJECTION, SOLUTION INTRAMUSCULAR; INTRAVENOUS; SUBCUTANEOUS
Status: DISCONTINUED | OUTPATIENT
Start: 2019-07-16 | End: 2019-07-16 | Stop reason: HOSPADM

## 2019-07-16 RX ORDER — HYDROMORPHONE HYDROCHLORIDE 2 MG/ML
0.5 INJECTION, SOLUTION INTRAMUSCULAR; INTRAVENOUS; SUBCUTANEOUS
Status: DISCONTINUED | OUTPATIENT
Start: 2019-07-16 | End: 2019-07-16 | Stop reason: SDUPTHER

## 2019-07-16 RX ORDER — INDOCYANINE GREEN AND WATER 25 MG
KIT INJECTION AS NEEDED
Status: DISCONTINUED | OUTPATIENT
Start: 2019-07-16 | End: 2019-07-16 | Stop reason: HOSPADM

## 2019-07-16 RX ORDER — MAGNESIUM SULFATE 100 %
4 CRYSTALS MISCELLANEOUS AS NEEDED
Status: DISCONTINUED | OUTPATIENT
Start: 2019-07-16 | End: 2019-07-16 | Stop reason: HOSPADM

## 2019-07-16 RX ORDER — GABAPENTIN 300 MG/1
300 CAPSULE ORAL 3 TIMES DAILY
Status: DISCONTINUED | OUTPATIENT
Start: 2019-07-16 | End: 2019-07-19 | Stop reason: HOSPADM

## 2019-07-16 RX ORDER — BUPIVACAINE HYDROCHLORIDE AND EPINEPHRINE 2.5; 5 MG/ML; UG/ML
INJECTION, SOLUTION EPIDURAL; INFILTRATION; INTRACAUDAL; PERINEURAL AS NEEDED
Status: DISCONTINUED | OUTPATIENT
Start: 2019-07-16 | End: 2019-07-16 | Stop reason: HOSPADM

## 2019-07-16 RX ORDER — SUCCINYLCHOLINE CHLORIDE 20 MG/ML
INJECTION INTRAMUSCULAR; INTRAVENOUS AS NEEDED
Status: DISCONTINUED | OUTPATIENT
Start: 2019-07-16 | End: 2019-07-16 | Stop reason: HOSPADM

## 2019-07-16 RX ORDER — CEFAZOLIN SODIUM 2 G/50ML
2 SOLUTION INTRAVENOUS EVERY 8 HOURS
Status: COMPLETED | OUTPATIENT
Start: 2019-07-17 | End: 2019-07-17

## 2019-07-16 RX ORDER — FENTANYL CITRATE 50 UG/ML
INJECTION, SOLUTION INTRAMUSCULAR; INTRAVENOUS AS NEEDED
Status: DISCONTINUED | OUTPATIENT
Start: 2019-07-16 | End: 2019-07-16 | Stop reason: HOSPADM

## 2019-07-16 RX ORDER — PROPOFOL 10 MG/ML
INJECTION, EMULSION INTRAVENOUS AS NEEDED
Status: DISCONTINUED | OUTPATIENT
Start: 2019-07-16 | End: 2019-07-16 | Stop reason: HOSPADM

## 2019-07-16 RX ORDER — HEPARIN SODIUM 5000 [USP'U]/ML
5000 INJECTION, SOLUTION INTRAVENOUS; SUBCUTANEOUS EVERY 8 HOURS
Status: DISCONTINUED | OUTPATIENT
Start: 2019-07-17 | End: 2019-07-19 | Stop reason: HOSPADM

## 2019-07-16 RX ORDER — ESMOLOL HYDROCHLORIDE 10 MG/ML
INJECTION INTRAVENOUS AS NEEDED
Status: DISCONTINUED | OUTPATIENT
Start: 2019-07-16 | End: 2019-07-16 | Stop reason: HOSPADM

## 2019-07-16 RX ORDER — SODIUM CHLORIDE, SODIUM LACTATE, POTASSIUM CHLORIDE, CALCIUM CHLORIDE 600; 310; 30; 20 MG/100ML; MG/100ML; MG/100ML; MG/100ML
75 INJECTION, SOLUTION INTRAVENOUS CONTINUOUS
Status: DISCONTINUED | OUTPATIENT
Start: 2019-07-16 | End: 2019-07-16 | Stop reason: HOSPADM

## 2019-07-16 RX ORDER — ONDANSETRON 2 MG/ML
4 INJECTION INTRAMUSCULAR; INTRAVENOUS ONCE
Status: DISCONTINUED | OUTPATIENT
Start: 2019-07-16 | End: 2019-07-16 | Stop reason: SDUPTHER

## 2019-07-16 RX ORDER — GABAPENTIN 300 MG/1
600 CAPSULE ORAL ONCE
Status: COMPLETED | OUTPATIENT
Start: 2019-07-16 | End: 2019-07-16

## 2019-07-16 RX ORDER — CELECOXIB 100 MG/1
100 CAPSULE ORAL 2 TIMES DAILY
Status: DISCONTINUED | OUTPATIENT
Start: 2019-07-17 | End: 2019-07-19 | Stop reason: HOSPADM

## 2019-07-16 RX ORDER — NEOSTIGMINE METHYLSULFATE 5 MG/5 ML
SYRINGE (ML) INTRAVENOUS AS NEEDED
Status: DISCONTINUED | OUTPATIENT
Start: 2019-07-16 | End: 2019-07-16 | Stop reason: HOSPADM

## 2019-07-16 RX ORDER — INSULIN LISPRO 100 [IU]/ML
INJECTION, SOLUTION INTRAVENOUS; SUBCUTANEOUS ONCE
Status: DISCONTINUED | OUTPATIENT
Start: 2019-07-16 | End: 2019-07-16 | Stop reason: SDUPTHER

## 2019-07-16 RX ORDER — VECURONIUM BROMIDE FOR INJECTION 1 MG/ML
INJECTION, POWDER, LYOPHILIZED, FOR SOLUTION INTRAVENOUS AS NEEDED
Status: DISCONTINUED | OUTPATIENT
Start: 2019-07-16 | End: 2019-07-16 | Stop reason: HOSPADM

## 2019-07-16 RX ORDER — ACETAMINOPHEN 500 MG
1000 TABLET ORAL EVERY 6 HOURS
Status: DISCONTINUED | OUTPATIENT
Start: 2019-07-17 | End: 2019-07-19 | Stop reason: HOSPADM

## 2019-07-16 RX ADMIN — FENTANYL CITRATE 50 MCG: 50 INJECTION, SOLUTION INTRAMUSCULAR; INTRAVENOUS at 19:44

## 2019-07-16 RX ADMIN — Medication 10 MG: at 14:15

## 2019-07-16 RX ADMIN — GABAPENTIN 300 MG: 300 CAPSULE ORAL at 23:23

## 2019-07-16 RX ADMIN — Medication 100 MCG: at 14:20

## 2019-07-16 RX ADMIN — ONDANSETRON 4 MG: 2 INJECTION INTRAMUSCULAR; INTRAVENOUS at 16:18

## 2019-07-16 RX ADMIN — FENTANYL CITRATE 50 MCG: 50 INJECTION, SOLUTION INTRAMUSCULAR; INTRAVENOUS at 14:25

## 2019-07-16 RX ADMIN — GABAPENTIN 600 MG: 300 CAPSULE ORAL at 13:36

## 2019-07-16 RX ADMIN — ESMOLOL HYDROCHLORIDE 20 MG: 10 INJECTION INTRAVENOUS at 20:08

## 2019-07-16 RX ADMIN — FENTANYL CITRATE 100 MCG: 50 INJECTION, SOLUTION INTRAMUSCULAR; INTRAVENOUS at 14:11

## 2019-07-16 RX ADMIN — LIDOCAINE HYDROCHLORIDE 60 MG: 20 INJECTION, SOLUTION EPIDURAL; INFILTRATION; INTRACAUDAL; PERINEURAL at 14:11

## 2019-07-16 RX ADMIN — FAMOTIDINE 20 MG: 10 INJECTION INTRAVENOUS at 11:23

## 2019-07-16 RX ADMIN — VECURONIUM BROMIDE FOR INJECTION 1 MG: 1 INJECTION, POWDER, LYOPHILIZED, FOR SOLUTION INTRAVENOUS at 15:51

## 2019-07-16 RX ADMIN — INDOCYANINE GREEN AND WATER 7.5 MG: KIT at 16:49

## 2019-07-16 RX ADMIN — PROPOFOL 120 MG: 10 INJECTION, EMULSION INTRAVENOUS at 14:11

## 2019-07-16 RX ADMIN — ACETAMINOPHEN 1000 MG: 500 TABLET ORAL at 13:36

## 2019-07-16 RX ADMIN — VECURONIUM BROMIDE FOR INJECTION 4 MG: 1 INJECTION, POWDER, LYOPHILIZED, FOR SOLUTION INTRAVENOUS at 14:20

## 2019-07-16 RX ADMIN — FENTANYL CITRATE 50 MCG: 50 INJECTION, SOLUTION INTRAMUSCULAR; INTRAVENOUS at 17:37

## 2019-07-16 RX ADMIN — FENTANYL CITRATE 25 MCG: 50 INJECTION, SOLUTION INTRAMUSCULAR; INTRAVENOUS at 20:27

## 2019-07-16 RX ADMIN — GLYCOPYRROLATE 0.4 MG: 0.2 INJECTION INTRAMUSCULAR; INTRAVENOUS at 19:41

## 2019-07-16 RX ADMIN — CELECOXIB 200 MG: 100 CAPSULE ORAL at 13:36

## 2019-07-16 RX ADMIN — LABETALOL HYDROCHLORIDE 5 MG: 5 INJECTION, SOLUTION INTRAVENOUS at 14:53

## 2019-07-16 RX ADMIN — METRONIDAZOLE 500 MG: 500 INJECTION, SOLUTION INTRAVENOUS at 14:32

## 2019-07-16 RX ADMIN — CEFAZOLIN 2 G: 10 INJECTION, POWDER, FOR SOLUTION INTRAVENOUS at 14:21

## 2019-07-16 RX ADMIN — SODIUM CHLORIDE, SODIUM LACTATE, POTASSIUM CHLORIDE, CALCIUM CHLORIDE: 600; 310; 30; 20 INJECTION, SOLUTION INTRAVENOUS at 17:05

## 2019-07-16 RX ADMIN — Medication 10 MG: at 14:18

## 2019-07-16 RX ADMIN — ACETAMINOPHEN 1000 MG: 500 TABLET ORAL at 23:12

## 2019-07-16 RX ADMIN — Medication 3 MG: at 19:41

## 2019-07-16 RX ADMIN — MORPHINE SULFATE 2 MG: 2 INJECTION, SOLUTION INTRAMUSCULAR; INTRAVENOUS at 23:10

## 2019-07-16 RX ADMIN — Medication 100 MCG: at 18:54

## 2019-07-16 RX ADMIN — DEXAMETHASONE SODIUM PHOSPHATE 4 MG: 4 INJECTION, SOLUTION INTRA-ARTICULAR; INTRALESIONAL; INTRAMUSCULAR; INTRAVENOUS; SOFT TISSUE at 14:26

## 2019-07-16 RX ADMIN — CEFAZOLIN 2 G: 10 INJECTION, POWDER, FOR SOLUTION INTRAVENOUS at 19:42

## 2019-07-16 RX ADMIN — FENTANYL CITRATE 25 MCG: 50 INJECTION, SOLUTION INTRAMUSCULAR; INTRAVENOUS at 20:00

## 2019-07-16 RX ADMIN — SODIUM CHLORIDE: 9 INJECTION, SOLUTION INTRAVENOUS at 14:20

## 2019-07-16 RX ADMIN — SUCCINYLCHOLINE CHLORIDE 100 MG: 20 INJECTION INTRAMUSCULAR; INTRAVENOUS at 14:11

## 2019-07-16 RX ADMIN — FENTANYL CITRATE 50 MCG: 50 INJECTION, SOLUTION INTRAMUSCULAR; INTRAVENOUS at 16:13

## 2019-07-16 RX ADMIN — SODIUM CHLORIDE, SODIUM LACTATE, POTASSIUM CHLORIDE, AND CALCIUM CHLORIDE 75 ML/HR: 600; 310; 30; 20 INJECTION, SOLUTION INTRAVENOUS at 23:12

## 2019-07-16 RX ADMIN — MIDAZOLAM HYDROCHLORIDE 2 MG: 1 INJECTION, SOLUTION INTRAMUSCULAR; INTRAVENOUS at 13:58

## 2019-07-16 RX ADMIN — ROCURONIUM BROMIDE 5 MG: 10 INJECTION, SOLUTION INTRAVENOUS at 14:11

## 2019-07-16 RX ADMIN — FAMOTIDINE 20 MG: 10 INJECTION, SOLUTION INTRAVENOUS at 23:12

## 2019-07-16 RX ADMIN — SODIUM CHLORIDE, SODIUM LACTATE, POTASSIUM CHLORIDE, AND CALCIUM CHLORIDE 75 ML/HR: 600; 310; 30; 20 INJECTION, SOLUTION INTRAVENOUS at 11:23

## 2019-07-16 RX ADMIN — VECURONIUM BROMIDE FOR INJECTION 1 MG: 1 INJECTION, POWDER, LYOPHILIZED, FOR SOLUTION INTRAVENOUS at 17:32

## 2019-07-16 RX ADMIN — INDOCYANINE GREEN AND WATER 7.5 MG: KIT at 18:09

## 2019-07-16 NOTE — H&P
HPI: Wale Morris is a 79 y.o. female presenting with chief complain of diverticulitis. Past Medical History:   Diagnosis Date    Depression     Diverticulitis        Past Surgical History:   Procedure Laterality Date    HX COLONOSCOPY  2007; 2/2019    neg; 2nd colo showed diverticula    HX OTHER SURGICAL      Right arm       Family History   Problem Relation Age of Onset    Hypertension Father     Heart Disease Father     No Known Problems Mother     Heart Attack Brother         age 48    Heart Attack Paternal Grandfather         age 72    Heart Attack Brother         age 62       Social History     Socioeconomic History    Marital status:      Spouse name: Not on file    Number of children: Not on file    Years of education: Not on file    Highest education level: Not on file   Tobacco Use    Smoking status: Never Smoker    Smokeless tobacco: Never Used   Substance and Sexual Activity    Alcohol use: Yes     Comment: occassionally    Drug use: No       Review of Systems - neg    Outpatient Medications Marked as Taking for the 7/16/19 encounter [facility] Encounter)   Medication Sig Dispense Refill    ondansetron (ZOFRAN ODT) 4 mg disintegrating tablet Take 1 Tab by mouth every eight (8) hours as needed for Nausea. 14 Tab 0    hydrocortisone (ANUSOL-HC) 2.5 % rectal cream Insert  into rectum four (4) times daily. (Patient taking differently: Insert  into rectum four (4) times daily as needed.) 30 g 0    cetirizine (ZYRTEC) 10 mg tablet Take 1 Tab by mouth daily.  (Patient taking differently: Take 10 mg by mouth daily as needed.) 30 Tab 3       Allergies   Allergen Reactions    Septra [Sulfamethoprim Ds] Rash       Vitals:    07/12/19 1202 07/16/19 1119   BP:  141/85   Pulse:  (!) 109   Resp:  20   Temp:  98.1 °F (36.7 °C)   SpO2:  97%   Weight: 49.9 kg (110 lb) 49.4 kg (109 lb)   Height: 5' (1.524 m) 5' (1.524 m)       Physical Exam   Constitutional: She appears well-developed and well-nourished. HENT:   Head: Normocephalic and atraumatic. Eyes: Conjunctivae and EOM are normal.   Abdominal: Soft. She exhibits no distension. There is no tenderness. Musculoskeletal: Normal range of motion. Lymphadenopathy:     She has no cervical adenopathy. Right: No inguinal adenopathy present. Left: No inguinal adenopathy present. Neurological: No sensory deficit. Skin: Skin is warm and dry. No rash noted. Psychiatric: She has a normal mood and affect. Her speech is normal.       Assessment / Plan    Robotic sigmoid colectomy    The diagnoses and plan were discussed with the patient. All questions answered. Plan of care agreed to by all concerned.

## 2019-07-16 NOTE — ANESTHESIA PREPROCEDURE EVALUATION
Relevant Problems   No relevant active problems       Anesthetic History   No history of anesthetic complications            Review of Systems / Medical History  Patient summary reviewed and pertinent labs reviewed    Pulmonary  Within defined limits                 Neuro/Psych              Cardiovascular  Within defined limits                Exercise tolerance: >4 METS     GI/Hepatic/Renal  Within defined limits              Endo/Other  Within defined limits           Other Findings              Physical Exam    Airway  Mallampati: II  TM Distance: 4 - 6 cm  Neck ROM: normal range of motion   Mouth opening: Normal     Cardiovascular  Regular rate and rhythm,  S1 and S2 normal,  no murmur, click, rub, or gallop  Rhythm: regular  Rate: normal         Dental    Dentition: Lower dentition intact and Upper dentition intact     Pulmonary  Breath sounds clear to auscultation               Abdominal  GI exam deferred       Other Findings            Anesthetic Plan    ASA: 2  Anesthesia type: general          Induction: Intravenous  Anesthetic plan and risks discussed with: Patient

## 2019-07-17 LAB
ANION GAP SERPL CALC-SCNC: 5 MMOL/L (ref 3–18)
BUN SERPL-MCNC: 8 MG/DL (ref 7–18)
BUN/CREAT SERPL: 12 (ref 12–20)
CALCIUM SERPL-MCNC: 7.4 MG/DL (ref 8.5–10.1)
CHLORIDE SERPL-SCNC: 108 MMOL/L (ref 100–108)
CO2 SERPL-SCNC: 25 MMOL/L (ref 21–32)
CREAT SERPL-MCNC: 0.68 MG/DL (ref 0.6–1.3)
ERYTHROCYTE [DISTWIDTH] IN BLOOD BY AUTOMATED COUNT: 13.4 % (ref 11.6–14.5)
GLUCOSE SERPL-MCNC: 134 MG/DL (ref 74–99)
HCT VFR BLD AUTO: 27.9 % (ref 35–45)
HGB BLD-MCNC: 9.6 G/DL (ref 12–16)
MAGNESIUM SERPL-MCNC: 1.5 MG/DL (ref 1.6–2.6)
MCH RBC QN AUTO: 31.3 PG (ref 24–34)
MCHC RBC AUTO-ENTMCNC: 34.4 G/DL (ref 31–37)
MCV RBC AUTO: 90.9 FL (ref 74–97)
PHOSPHATE SERPL-MCNC: 3 MG/DL (ref 2.5–4.9)
PLATELET # BLD AUTO: 246 K/UL (ref 135–420)
PMV BLD AUTO: 9.8 FL (ref 9.2–11.8)
POTASSIUM SERPL-SCNC: 3.5 MMOL/L (ref 3.5–5.5)
RBC # BLD AUTO: 3.07 M/UL (ref 4.2–5.3)
SODIUM SERPL-SCNC: 138 MMOL/L (ref 136–145)
WBC # BLD AUTO: 9.3 K/UL (ref 4.6–13.2)

## 2019-07-17 PROCEDURE — 74011000250 HC RX REV CODE- 250: Performed by: COLON & RECTAL SURGERY

## 2019-07-17 PROCEDURE — 80048 BASIC METABOLIC PNL TOTAL CA: CPT

## 2019-07-17 PROCEDURE — 83735 ASSAY OF MAGNESIUM: CPT

## 2019-07-17 PROCEDURE — 84100 ASSAY OF PHOSPHORUS: CPT

## 2019-07-17 PROCEDURE — 65270000029 HC RM PRIVATE

## 2019-07-17 PROCEDURE — 77030027138 HC INCENT SPIROMETER -A

## 2019-07-17 PROCEDURE — 85027 COMPLETE CBC AUTOMATED: CPT

## 2019-07-17 PROCEDURE — 74011250636 HC RX REV CODE- 250/636: Performed by: COLON & RECTAL SURGERY

## 2019-07-17 PROCEDURE — 74011250637 HC RX REV CODE- 250/637: Performed by: COLON & RECTAL SURGERY

## 2019-07-17 PROCEDURE — 36415 COLL VENOUS BLD VENIPUNCTURE: CPT

## 2019-07-17 RX ADMIN — FAMOTIDINE 20 MG: 10 INJECTION, SOLUTION INTRAVENOUS at 21:10

## 2019-07-17 RX ADMIN — CELECOXIB 100 MG: 100 CAPSULE ORAL at 17:48

## 2019-07-17 RX ADMIN — ACETAMINOPHEN 1000 MG: 500 TABLET ORAL at 05:18

## 2019-07-17 RX ADMIN — HEPARIN SODIUM 5000 UNITS: 5000 INJECTION, SOLUTION INTRAVENOUS; SUBCUTANEOUS at 16:27

## 2019-07-17 RX ADMIN — ACETAMINOPHEN 1000 MG: 500 TABLET ORAL at 17:48

## 2019-07-17 RX ADMIN — CELECOXIB 100 MG: 100 CAPSULE ORAL at 08:41

## 2019-07-17 RX ADMIN — CEFAZOLIN 2 G: 10 INJECTION, POWDER, FOR SOLUTION INTRAVENOUS at 11:12

## 2019-07-17 RX ADMIN — GABAPENTIN 300 MG: 300 CAPSULE ORAL at 21:09

## 2019-07-17 RX ADMIN — FAMOTIDINE 20 MG: 10 INJECTION, SOLUTION INTRAVENOUS at 08:41

## 2019-07-17 RX ADMIN — CEFAZOLIN 2 G: 10 INJECTION, POWDER, FOR SOLUTION INTRAVENOUS at 04:06

## 2019-07-17 RX ADMIN — HEPARIN SODIUM 5000 UNITS: 5000 INJECTION, SOLUTION INTRAVENOUS; SUBCUTANEOUS at 08:41

## 2019-07-17 RX ADMIN — GABAPENTIN 300 MG: 300 CAPSULE ORAL at 08:41

## 2019-07-17 RX ADMIN — ACETAMINOPHEN 1000 MG: 500 TABLET ORAL at 11:12

## 2019-07-17 RX ADMIN — CEFAZOLIN 2 G: 10 INJECTION, POWDER, FOR SOLUTION INTRAVENOUS at 19:00

## 2019-07-17 NOTE — PROGRESS NOTES
[facility] [address]  [address] [facility] [address]  [phone]  Colon and Rectal Surgery Progress Note      Patient: Gavin Tim MRN: [id number]  SSN: [id number]    YOB: 1950  Age: 79 y.o. Sex: female      Admit Date: 7/16/2019    LOS: 1 day     Subjective:   Patient did well overnight. Denies abdominal pain, N/V. +Flatulence, no BM. Objective:     Vitals:    07/16/19 2110 07/16/19 2202 07/17/19 0215 07/17/19 0547   BP: 122/60 111/69 95/55 96/59   Pulse: 89 87 74 69   Resp: 19 18 17 18   Temp:  97.1 °F (36.2 °C) 97.5 °F (36.4 °C) 97.8 °F (36.6 °C)   SpO2: 99% 97% 99% 99%   Weight:       Height:            Intake and Output:  Current Shift: No intake/output data recorded.   Last three shifts: 07/15 1901 - 07/17 0700  In: 6332.5 [I.V.:6332.5]  Out: 1300 [Urine:1200]    Physical Exam:     Abdomen: Soft, non-distended, non-tender; incision sites c/d/i    Lab/Data Review:  Recent Results (from the past 12 hour(s))   METABOLIC PANEL, BASIC    Collection Time: 07/17/19  4:58 AM   Result Value Ref Range    Sodium 138 136 - 145 mmol/L    Potassium 3.5 3.5 - 5.5 mmol/L    Chloride 108 100 - 108 mmol/L    CO2 25 21 - 32 mmol/L    Anion gap 5 3.0 - 18 mmol/L    Glucose 134 (H) 74 - 99 mg/dL    BUN 8 7.0 - 18 MG/DL    Creatinine 0.68 0.6 - 1.3 MG/DL    BUN/Creatinine ratio 12 12 - 20      GFR est AA >60 >60 ml/min/1.73m2    GFR est non-AA >60 >60 ml/min/1.73m2    Calcium 7.4 (L) 8.5 - 10.1 MG/DL   CBC W/O DIFF    Collection Time: 07/17/19  4:58 AM   Result Value Ref Range    WBC 9.3 4.6 - 13.2 K/uL    RBC 3.07 (L) 4.20 - 5.30 M/uL    HGB 9.6 (L) 12.0 - 16.0 g/dL    HCT 27.9 (L) 35.0 - 45.0 %    MCV 90.9 74.0 - 97.0 FL    MCH 31.3 24.0 - 34.0 PG    MCHC 34.4 31.0 - 37.0 g/dL    RDW 13.4 11.6 - 14.5 %    PLATELET 865 103 - 748 K/uL    MPV 9.8 9.2 - 11.8 FL   MAGNESIUM    Collection Time: 07/17/19  4:58 AM   Result Value Ref Range    Magnesium 1.5 (L) 1.6 - 2.6 mg/dL   PHOSPHORUS    Collection Time: 07/17/19  4:58 AM   Result Value Ref Range    Phosphorus 3.0 2.5 - 4.9 MG/DL         Assessment:   Recurrent Diverticulitis    POD#1: Robotic Sigmoid Colectomy     Plan:   - Advance to clear diet  - D/c bella  - Encourage ambulation  - Continue daily CBC, BMP, Mag, and Phos      Natan Glass MD, PGY-2  [facility]  07/17/19 10:00 AM

## 2019-07-17 NOTE — ROUTINE PROCESS
Assumed patient care. Bedside shift report received from [address]. Patient in bed, awake, alert and oriented x4. Denies pain or discomforts at this time. Call light placed within reach. Bed in low position. 7:30 AM -Bedside shift change report given to [address] (oncoming nurse) by Matt Cho RN (offgoing nurse). Report given with SBAR, Kardex, Intake/Output, MAR and Recent Results.

## 2019-07-17 NOTE — PROGRESS NOTES
conducted an initial consultation and Spiritual Assessment for Lissa Oconnor, who is a 79 y.o.,female. Patient's Primary Language is: [place]. According to the patient's EMR Sikhism Affiliation is: [address].     The reason the Patient came to the hospital is:   Patient Active Problem List    Diagnosis Date Noted    Diverticulitis 07/16/2019    Mixed hyperlipidemia 04/28/2017    Depression 09/02/2014        The  provided the following Interventions:  Initiated a relationship of care and support with this very pleasant 79year old female patient. Explored issues of alejandro, belief, spirituality and Spiritism/ritual needs while hospitalized. Listened empathically as the patient shared the reason for her hospitalization. Provided information about Spiritual Care Services. Offered prayer and assurance of continued prayers on patient's behalf. The following outcomes where achieved:  Patient shared limited information about both her medical narrative and spiritual journey/beliefs.  confirmed Patient's Sikhism Affiliation in the [address] tradition. Patient processed feeling about current hospitalization. Patient expressed gratitude for 's visit. Assessment:  Patient does not have any Spiritism/cultural needs that will affect patient's preferences in health care. There are no spiritual or Spiritism issues which require intervention at this time. Plan:  Chaplains will continue to follow and will provide pastoral care on an as needed/requested basis.  recommends bedside caregivers page  on duty if patient shows signs of acute spiritual or emotional distress.     [address] [place] [address]   [phone]

## 2019-07-17 NOTE — BRIEF OP NOTE
BRIEF OPERATIVE NOTE    Date of Procedure: 7/16/2019   Preoperative Diagnosis: K57.32 DIVERTICULITIS OF COLON  Postoperative Diagnosis: K57.32 DIVERTICULITIS OF COLON    Procedure(s):  ROBOTIC SIGMOID COLECTOMY WITH TRANS-RECTAL EXTRACTION, SPLENIC FLEXURE MOBILIZATION    Surgeon(s) and Role:     Alois Hodgkin, MD - Primary         Surgical Assistant: none    Surgical Staff:  Circ-1: Lexx Pyle RN  Circ-Relief: Shamika Urban RN; Lamberto Loaiza  Scrub Tech-1: Paulette Hoyt  Scrub Tech-Relief: Sushma Peraza  Surg Asst-1: Taryn Alvarez  Event Time In Time Out   Incision Start 1434    Incision Close       Anesthesia: General   Estimated Blood Loss: 100 ml  Specimens:   ID Type Source Tests Collected by Time Destination   1 : sigmoid colon Preservative   Kraig Castleman, MD 7/16/2019 1739 Pathology   2 : Donut from anastomosis Preservative   Kraig Castleman, MD 7/16/2019 1952 Pathology      Findings: diverticulitis   Complications: none  Implants: * No implants in log *    [id number]

## 2019-07-17 NOTE — PROGRESS NOTES
Mobility Intervention:       [x] Pt dangled at edge of bed    [] Pt assisted OOB to bedside commode    [] Pt assisted OOB to chair    [x] Pt ambulated to bathroom    [x] Patient was ambulated in room/hallway    Assistive Device Utilized:       [] Rolling walker   [] Crutches   [] Straight Cane   [] Knee immobilizer   [] IV pole    After Rounding and Checking on Patient     [] Patient left in no apparent distress sitting up in chair  [x] Patient left in no apparent distress in bed  [x] Call bell left within reach  [x] SCDs on both legs & machine turned on  [] Ice applied  [] RN notified  []  present  [] Bed alarm activated    Reason patient not mobilized:      [] Patient refused   [] Nausea/vomiting   [] Low blood pressure   [] Drowsy/lethargic    Pain Ratin         Left patient with call light, cell phone and personal belongings in reach for safety.

## 2019-07-17 NOTE — ANESTHESIA POSTPROCEDURE EVALUATION
Procedure(s):  ROBOTIC SIGMOID COLECTOMY  SIGMOIDOSCOPY FLEXIBLE (No endo tech needed). general    Anesthesia Post Evaluation      Multimodal analgesia: multimodal analgesia used between 6 hours prior to anesthesia start to PACU discharge  Patient location during evaluation: bedside  Patient participation: complete - patient participated  Level of consciousness: awake  Pain management: adequate  Airway patency: patent  Anesthetic complications: no  Cardiovascular status: stable  Respiratory status: acceptable  Hydration status: acceptable  Post anesthesia nausea and vomiting:  controlled      Vitals Value Taken Time   /60 7/16/2019  9:10 PM   Temp 36.4 °C (97.6 °F) 7/16/2019  9:00 PM   Pulse 83 7/16/2019  9:21 PM   Resp 16 7/16/2019  9:21 PM   SpO2 98 % 7/16/2019  9:21 PM   Vitals shown include unvalidated device data.

## 2019-07-17 NOTE — PROGRESS NOTES
NUTRITION    Nursing Referral: [facility]     RECOMMENDATIONS / PLAN:     - Monitor GI symptoms and readiness for diet advancement. - Add nutritional supplements: Ensure Clear TID  - Continue IV fluids until oral diet started and intake adequate. - Continue RD inpatient monitoring and evaluation. NUTRITION INTERVENTIONS & DIAGNOSIS:     [x] Meals/snacks: modify composition  [x] Medical food supplement therapy: initiate  [x] IV Fluids: continue    Nutrition Diagnosis: Malnutrition related to inadequate energy intake as evidenced by pt consuming <75 of energy needs x 3 months and 6% weight loss x 1 month. Patient meets criteria for Severe Protein Calorie Malnutrition as evidenced by:   ASPEN Malnutrition Criteria  Acute Illness, Chronic Illness, or Social/Enviornmental: (P) Acute illness  Energy Intake: (P) Less than/equal to 75% of est energy req for greater than/equal to 1 month  Weight Loss: (P) Greater than 5% x 1 mo  ASPEN Malnutrition Score - Chronic Illness: (P) 12  Chronic Illness - Malnutrition Diagnosis: (P) Severe malnutrition. ASSESSMENT:     S/p sigmoid colectomy for diverticulitis on 7/16. Clear liquid diet started for lunch. Poor meal intake PTA 2/2 nausea, consuming <50% of 2 meals per day, portions and foods discussed. Consumed Boost, usually 1 per day. Agreeable to supplements in house, discharge diet and supplement rec discussed.      Average po intake adequate to meet patients estimated nutritional needs:   [] Yes     [x] No   [] Unable to determine at this time    Diet: DIET CLEAR LIQUID      Food Allergies: NKFA  Current Appetite:   [] Good     [x] Fair     [] Poor     [] Other:  Appetite/meal intake prior to admission:   [] Good     [] Fair     [x] Poor     [] Other:  Feeding Limitations:  [] Swallowing difficulty    [] Chewing difficulty    [] Other:  Current Meal Intake:   Patient Vitals for the past 100 hrs:   % Diet Eaten   07/17/19 0840 0 %       BM: 7/16  Skin Integrity: surgical incision to abdomen  Edema:   [x] No     [] Yes   Pertinent Medications: Reviewed: zofran, LR at 75 mL/hr    Recent Labs     07/17/19  0458      K 3.5      CO2 25   *   BUN 8   CREA 0.68   CA 7.4*   MG 1.5*   PHOS 3.0       Intake/Output Summary (Last 24 hours) at 7/17/2019 1231  Last data filed at 7/17/2019 0840  Gross per 24 hour   Intake 6332.5 ml   Output 1300 ml   Net 5032.5 ml       Anthropometrics:  Ht Readings from Last 1 Encounters:   07/16/19 5' (1.524 m)     Last 3 Recorded Weights in this Encounter    07/12/19 1202 07/16/19 1119   Weight: 49.9 kg (110 lb) 49.4 kg (109 lb)     Body mass index is 21.29 kg/m². Weight History: -6% x 1 month, -12% x 3-4 months    Weight Metrics 7/16/2019 6/27/2019 6/13/2019 6/9/2019 5/30/2019 5/13/2019 3/29/2019   Weight 109 lb 112 lb 116 lb 115 lb 119 lb 122 lb 12.8 oz 124 lb   BMI 21.29 kg/m2 21.87 kg/m2 22.65 kg/m2 22.46 kg/m2 23.24 kg/m2 23.98 kg/m2 24.22 kg/m2        Admitting Diagnosis: Diverticulitis [K57.92]  Pertinent PMHx: depression    Education Needs:        [x] None identified  [] Identified - Not appropriate at this time  []  Identified and addressed - refer to education log  Learning Limitations:   [x] None identified  [] Identified    Cultural, Congregational & ethnic food preferences:  [x] None identified    [] Identified and addressed     ESTIMATED NUTRITION NEEDS:     Calories: 1895-1083 kcal (MSJx1.2-1.3) based on  [x] Actual BW 49 kg      [] IBW   Protein: 49-59 gm (1-1.2 gm/kg) based on  [x] Actual BW      [] IBW   Fluid: 1 mL/kcal     MONITORING & EVALUATION:     Nutrition Goal(s):   1. Po intake of meals will meet >75% of patient estimated nutritional needs within the next 7 days.   Outcome:  [] Met/Ongoing    [] Progressing towards goal    [] Not progressing towards goal    [x] New/Initial goal     Monitoring:   [x] Food and beverage intake   [x] Diet order   [x] Nutrition-focused physical findings   [x] Treatment/therapy   [] Weight   [] Enteral nutrition intake        Previous Recommendations (for follow-up assessments only):     []   Implemented       []   Not Implemented (RD to address)      [] No Longer Appropriate     [] No Recommendation Made     Discharge Planning: high fiber diet, pending diet tolerance + Ensure BID  [x] Participated in care planning, discharge planning, & interdisciplinary rounds as appropriate      Abad Gurrola RD, [address]   Pager: [phone]

## 2019-07-17 NOTE — PROGRESS NOTES
Problem: Patient Education: Go to Patient Education Activity  Goal: Patient/Family Education  Outcome: Progressing Towards Goal     Problem: Surgical Pathway Day of Surgery  Goal: Off Pathway (Use only if patient is Off Pathway)  Outcome: Progressing Towards Goal  Goal: Activity/Safety  Outcome: Progressing Towards Goal  Goal: Consults, if ordered  Outcome: Progressing Towards Goal  Goal: Nutrition/Diet  Outcome: Progressing Towards Goal  Goal: Medications  Outcome: Progressing Towards Goal  Goal: Respiratory  Outcome: Progressing Towards Goal  Goal: Treatments/Interventions/Procedures  Outcome: Progressing Towards Goal  Goal: Psychosocial  Outcome: Progressing Towards Goal  Goal: *No signs and symptoms of infection or wound complications  Outcome: Progressing Towards Goal  Goal: *Optimal pain control at patient's stated goal  Outcome: Progressing Towards Goal  Goal: *Adequate urinary output (equal to or greater than 30 milliliters/hour)  Description  Ambulatory Surgery patients voiding without difficulty.   Outcome: Progressing Towards Goal  Goal: *Hemodynamically stable  Outcome: Progressing Towards Goal  Goal: *Tolerating diet  Outcome: Progressing Towards Goal  Goal: *Demonstrates progressive activity  Outcome: Progressing Towards Goal     Problem: Surgical Pathway Post-Op Day 1  Goal: Off Pathway (Use only if patient is Off Pathway)  Outcome: Progressing Towards Goal  Goal: Activity/Safety  Outcome: Progressing Towards Goal  Goal: Diagnostic Test/Procedures  Outcome: Progressing Towards Goal  Goal: Nutrition/Diet  Outcome: Progressing Towards Goal  Goal: Discharge Planning  Outcome: Progressing Towards Goal  Goal: Medications  Outcome: Progressing Towards Goal  Goal: Respiratory  Outcome: Progressing Towards Goal  Goal: Treatments/Interventions/Procedures  Outcome: Progressing Towards Goal  Goal: Psychosocial  Outcome: Progressing Towards Goal  Goal: *No signs and symptoms of infection or wound complications  Outcome: Progressing Towards Goal  Goal: *Optimal pain control at patient's stated goal  Outcome: Progressing Towards Goal  Goal: *Adequate urinary output (equal to or greater than 30 milliliters/hour)  Outcome: Progressing Towards Goal  Goal: *Hemodynamically stable  Outcome: Progressing Towards Goal  Goal: *Tolerating diet  Outcome: Progressing Towards Goal  Goal: *Demonstrates progressive activity  Outcome: Progressing Towards Goal  Goal: *Lungs clear or at baseline  Outcome: Progressing Towards Goal     Problem: Surgical Pathway Post-Op Day 2 through Discharge  Goal: Off Pathway (Use only if patient is Off Pathway)  Outcome: Progressing Towards Goal  Goal: Activity/Safety  Outcome: Progressing Towards Goal  Goal: Nutrition/Diet  Outcome: Progressing Towards Goal  Goal: Discharge Planning  Outcome: Progressing Towards Goal  Goal: Medications  Outcome: Progressing Towards Goal  Goal: Respiratory  Outcome: Progressing Towards Goal  Goal: Treatments/Interventions/Procedures  Outcome: Progressing Towards Goal  Goal: Psychosocial  Outcome: Progressing Towards Goal  Goal: *No signs and symptoms of infection or wound complications  Outcome: Progressing Towards Goal  Goal: *Optimal pain control at patient's stated goal  Outcome: Progressing Towards Goal  Goal: *Adequate urinary output (equal to or greater than 30 milliliters/hour)  Outcome: Progressing Towards Goal  Goal: *Hemodynamically stable  Outcome: Progressing Towards Goal  Goal: *Tolerating diet  Outcome: Progressing Towards Goal  Goal: *Demonstrates progressive activity  Outcome: Progressing Towards Goal  Goal: *Lungs clear or at baseline  Outcome: Progressing Towards Goal     Problem: Falls - Risk of  Goal: *Absence of Falls  Description  Document Zeb Fall Risk and appropriate interventions in the flowsheet.   Outcome: Progressing Towards Goal  Note:   Fall Risk Interventions:            Medication Interventions: Patient to call before getting OOB, Teach patient to arise slowly                   Problem: Patient Education: Go to Patient Education Activity  Goal: Patient/Family Education  Outcome: Progressing Towards Goal     Problem: Infection - Risk of, Surgical Site Infection  Goal: *Absence of surgical site infection signs and symptoms  Outcome: Progressing Towards Goal     Problem: Patient Education: Go to Patient Education Activity  Goal: Patient/Family Education  Outcome: Progressing Towards Goal     Problem: Deep Venous Thrombosis - Risk of  Goal: *Absence of deep venous thrombosis signs and symptoms(Stroke Metric)  Outcome: Progressing Towards Goal  Goal: *Absence of impaired coagulation signs and symptoms  Outcome: Progressing Towards Goal  Goal: *Knowledge of prescribed medications  Outcome: Progressing Towards Goal  Goal: *Absence of bleeding  Outcome: Progressing Towards Goal     Problem: Patient Education: Go to Patient Education Activity  Goal: Patient/Family Education  Outcome: Progressing Towards Goal

## 2019-07-17 NOTE — OP NOTES
[address] [place] [address]   OPERATIVE REPORT    Name:  Terry Loco  MR#:   [id number]  :  1950  ACCOUNT #:  [de-identified]  DATE OF SERVICE:  2019    PREOPERATIVE DIAGNOSIS:  Recurrent Diverticulitis. POSTOPERATIVE DIAGNOSIS:  Recurrent Diverticulitis. PROCEDURE PERFORMED:  Robotic sigmoid colectomy with trans-rectal extraction, splenic flexure mobilization. SURGEON:  Lorena Leach. Katja Augustine MD    ASSISTANT:  None. ANESTHESIA:  General.    COMPLICATIONS:  None. SPECIMENS REMOVED:  Sigmoid colon to pathology. IMPLANTS:  None. ESTIMATED BLOOD LOSS:  100 mL. FINDINGS:  Sigmoid diverticulitis. INDICATIONS:  The patient is a 80-year-old woman with chronic recurrent diverticulitis. She was brought to the operating room for robotic sigmoid colectomy. I explained the risks of the procedure including bleeding, infection, injury to surrounding structures, need for temporary or permanent stoma, and death. She understood and wished to proceed. PROCEDURE:  The patient was properly identified in the holding area and brought to the operating room. She was laid supine on the operating room table. General anesthesia was administered. Macias catheter was placed. She was placed in a split-leg position with arms tucked to her side and chest strapped to her bed. The abdomen was prepped and draped in the usual sterile fashion. We made a small stab incision in the left subcostal margin and inserted a Veress needle and insufflated the abdomen to 15 mmHg. We placed an 8-mm robotic port in the upper midline, two in the patient's right side, and a 12-mm port in the right lower quadrant. In addition, FDC through the case, we placed an additional 5-mm port in the right lateral area as an assistant port. The patient was placed in Trendelenburg position with the right side down. The small bowel was moved to the patient's right side, and the robot was docked.   We scored the aorta, performed medial-to-lateral dissection of the descending and sigmoid colon. We took down the lateral attachments with hook cautery. We mobilized the proximal rectum at this point as well. We placed the patient in reverse Trendelenburg position, incised the lesser sac, transected all the omentum in the left upper quadrant, and took down the lateral attachments to completely mobilize the splenic flexure downward. We transected the very proximal rectum with the vessel sealing device. We entered the anvil of the 33-mm EEA stapler per anus through the rectum with ringed forcep. We ran a 3-0 Vicryl across the distal sigmoid colon to avoid any spillage. We transected the mesentery up to the very distal descending colon and subsequently transected the distal descending colon with the vessel sealing device. We sutured close this end of sigmoid colon with 3-0 Vicryl suture as well so that the specimen now was completely excised and tied off at both ends. We took the previously entered anvil and placed it through the proximal colon with the spike coming out proximally at the antimesenteric border. We then stapled across the open end of this portion of colon with two firings of 45-mm blue load Endo GAL stapler. We placed a ring forceps through the anus into the rectum and grasped the colon and pulled it through the rectum and out of the anus without injury. We then stapled across the rectal stump with two firings of 45-mm blue load Endo GAL stapler. The small strip of rectum which remained after stapling and the spike from the anvil were placed above the uterus and removed at the conclusion of the case through an EndoCatch bag. We mated the anvil to the EEA stapler which was placed per anus and advanced with the spike just beside the staple line. The stapler was closed, and after assuring good orientation and no twisting of the mesentery, the stapler was fired.   We noted a 1-cm seromuscular injury just superior to the anastomosis at least 5 cm away from it. This was closed in two layers with 3-0 Vicryl suture in interrupted fashion. Air leak test was performed with flexible colonoscope. The anastomosis was widely patent, and there was no air leak. There was healthy appearing mucosa proximal and distal.     We removed the small piece of proximal rectum and spike through the right lower quadrant 12-mm port site. We irrigated the abdominal cavity and assured hemostasis. All ports were removed under direct visualization. The right lower quadrant port site was closed at the level of the fascia with 0 Vicryl suture. Subcutaneous tissues were irrigated. Skin incisions were closed with 4-0 Monocryl subcuticular sutures. Steri-Strips were applied. The patient tolerated the procedure well. All instrument, sponge, and needle counts were correct at the end of the case x2. The patient awoke from anesthesia, was extubated, and transported to the PACU in stable condition.       Natalio Tucker MD      [id number]  D:  07/16/2019 20:19  T:  07/17/2019 2:42  JOB #:  [id number]

## 2019-07-17 NOTE — PROGRESS NOTES
Reason for Admission:  Diverticulitis [K57.92]2                 RRAT Score:    8            Plan for utilizing home health:    no                      Likelihood of Readmission:   LOW                         Transition of Care Plan:              Initial assessment completed with patient. Cognitive status of patient: oriented to time, place, person and situation. Face sheet information confirmed:  yes. The patient designates Daughter Nakia Verdugo to participate in her discharge plan and to receive any needed information. This patient lives in a [facility] with patient. Patient is able to navigate steps as needed. Prior to hospitalization, patient was considered to be independent with ADLs/IADLS : yes . Patient has a current ACP document on file: no  The patient and daughter will be available to transport patient home upon discharge. The patient already has none reported medical equipment available in the home. Patient is not currently active with home health. Patient has not stayed in a skilled nursing facility or rehab. .      This patient is on dialysis :no      List of available Home Health agencies were provided and reviewed with the patient prior to discharge. Freedom of choice signed: no,. Currently, the discharge plan is Home. The patient states that she can obtain her medications from the pharmacy, and take her medications as directed. Patient's current insurance is Medicare and [address] [place] [address] Management Interventions  PCP Verified by CM:  Yes  Last Visit to PCP: 04/17/19  Mode of Transport at Discharge: Self  Current Support Network: Lives Alone  Confirm Follow Up Transport: Family  Plan discussed with Pt/Family/Caregiver: Yes  Discharge Location  Discharge Placement: Chhaya Varela, RN  Care Manager  [phone]

## 2019-07-17 NOTE — ROUTINE PROCESS
TRANSFER - IN REPORT:    Verbal report received from Shahid Rendon RN (name) on Batsheva Landin  being received from PACU (unit) for routine progression of care      Report consisted of patients Situation, Background, Assessment and   Recommendations(SBAR). Information from the following report(s) OR Summary and MAR was reviewed with the receiving nurse. Opportunity for questions and clarification was provided. Assessment completed upon patients arrival to unit and care assumed.      Lemuel U. 81. assumes care of pt

## 2019-07-18 LAB
ANION GAP SERPL CALC-SCNC: 5 MMOL/L (ref 3–18)
BUN SERPL-MCNC: 8 MG/DL (ref 7–18)
BUN/CREAT SERPL: 13 (ref 12–20)
CALCIUM SERPL-MCNC: 8.2 MG/DL (ref 8.5–10.1)
CHLORIDE SERPL-SCNC: 112 MMOL/L (ref 100–111)
CO2 SERPL-SCNC: 26 MMOL/L (ref 21–32)
CREAT SERPL-MCNC: 0.62 MG/DL (ref 0.6–1.3)
ERYTHROCYTE [DISTWIDTH] IN BLOOD BY AUTOMATED COUNT: 13.6 % (ref 11.6–14.5)
GLUCOSE SERPL-MCNC: 92 MG/DL (ref 74–99)
HCT VFR BLD AUTO: 29.7 % (ref 35–45)
HGB BLD-MCNC: 10.1 G/DL (ref 12–16)
MAGNESIUM SERPL-MCNC: 1.6 MG/DL (ref 1.6–2.6)
MCH RBC QN AUTO: 31.1 PG (ref 24–34)
MCHC RBC AUTO-ENTMCNC: 34 G/DL (ref 31–37)
MCV RBC AUTO: 91.4 FL (ref 74–97)
PHOSPHATE SERPL-MCNC: 1.1 MG/DL (ref 2.5–4.9)
PLATELET # BLD AUTO: 273 K/UL (ref 135–420)
PMV BLD AUTO: 10.3 FL (ref 9.2–11.8)
POTASSIUM SERPL-SCNC: 3.4 MMOL/L (ref 3.5–5.5)
RBC # BLD AUTO: 3.25 M/UL (ref 4.2–5.3)
SODIUM SERPL-SCNC: 143 MMOL/L (ref 136–145)
WBC # BLD AUTO: 9.4 K/UL (ref 4.6–13.2)

## 2019-07-18 PROCEDURE — 74011250637 HC RX REV CODE- 250/637: Performed by: COLON & RECTAL SURGERY

## 2019-07-18 PROCEDURE — 80048 BASIC METABOLIC PNL TOTAL CA: CPT

## 2019-07-18 PROCEDURE — 74011250636 HC RX REV CODE- 250/636: Performed by: COLON & RECTAL SURGERY

## 2019-07-18 PROCEDURE — 74011250636 HC RX REV CODE- 250/636: Performed by: STUDENT IN AN ORGANIZED HEALTH CARE EDUCATION/TRAINING PROGRAM

## 2019-07-18 PROCEDURE — 85027 COMPLETE CBC AUTOMATED: CPT

## 2019-07-18 PROCEDURE — 65270000029 HC RM PRIVATE

## 2019-07-18 PROCEDURE — 74011000250 HC RX REV CODE- 250: Performed by: STUDENT IN AN ORGANIZED HEALTH CARE EDUCATION/TRAINING PROGRAM

## 2019-07-18 PROCEDURE — 84100 ASSAY OF PHOSPHORUS: CPT

## 2019-07-18 PROCEDURE — 74011000250 HC RX REV CODE- 250: Performed by: COLON & RECTAL SURGERY

## 2019-07-18 PROCEDURE — 83735 ASSAY OF MAGNESIUM: CPT

## 2019-07-18 PROCEDURE — 36415 COLL VENOUS BLD VENIPUNCTURE: CPT

## 2019-07-18 RX ADMIN — GABAPENTIN 300 MG: 300 CAPSULE ORAL at 08:14

## 2019-07-18 RX ADMIN — HEPARIN SODIUM 5000 UNITS: 5000 INJECTION, SOLUTION INTRAVENOUS; SUBCUTANEOUS at 23:37

## 2019-07-18 RX ADMIN — METRONIDAZOLE 500 MG: 500 INJECTION, SOLUTION INTRAVENOUS at 16:38

## 2019-07-18 RX ADMIN — GABAPENTIN 300 MG: 300 CAPSULE ORAL at 15:45

## 2019-07-18 RX ADMIN — METRONIDAZOLE 500 MG: 500 INJECTION, SOLUTION INTRAVENOUS at 08:15

## 2019-07-18 RX ADMIN — HEPARIN SODIUM 5000 UNITS: 5000 INJECTION, SOLUTION INTRAVENOUS; SUBCUTANEOUS at 08:14

## 2019-07-18 RX ADMIN — FAMOTIDINE 20 MG: 10 INJECTION, SOLUTION INTRAVENOUS at 08:14

## 2019-07-18 RX ADMIN — CELECOXIB 100 MG: 100 CAPSULE ORAL at 17:31

## 2019-07-18 RX ADMIN — METRONIDAZOLE 500 MG: 500 INJECTION, SOLUTION INTRAVENOUS at 00:33

## 2019-07-18 RX ADMIN — ACETAMINOPHEN 1000 MG: 500 TABLET ORAL at 00:31

## 2019-07-18 RX ADMIN — ACETAMINOPHEN 1000 MG: 500 TABLET ORAL at 17:30

## 2019-07-18 RX ADMIN — HEPARIN SODIUM 5000 UNITS: 5000 INJECTION, SOLUTION INTRAVENOUS; SUBCUTANEOUS at 00:32

## 2019-07-18 RX ADMIN — ACETAMINOPHEN 1000 MG: 500 TABLET ORAL at 06:15

## 2019-07-18 RX ADMIN — ACETAMINOPHEN 1000 MG: 500 TABLET ORAL at 23:38

## 2019-07-18 RX ADMIN — HEPARIN SODIUM 5000 UNITS: 5000 INJECTION, SOLUTION INTRAVENOUS; SUBCUTANEOUS at 15:45

## 2019-07-18 RX ADMIN — GABAPENTIN 300 MG: 300 CAPSULE ORAL at 22:32

## 2019-07-18 RX ADMIN — DIPHENHYDRAMINE HYDROCHLORIDE 25 MG: 50 INJECTION INTRAMUSCULAR; INTRAVENOUS at 22:32

## 2019-07-18 RX ADMIN — CELECOXIB 100 MG: 100 CAPSULE ORAL at 08:14

## 2019-07-18 RX ADMIN — FAMOTIDINE 20 MG: 10 INJECTION, SOLUTION INTRAVENOUS at 22:33

## 2019-07-18 RX ADMIN — ACETAMINOPHEN 1000 MG: 500 TABLET ORAL at 11:42

## 2019-07-18 RX ADMIN — SODIUM CHLORIDE: 900 INJECTION, SOLUTION INTRAVENOUS at 10:19

## 2019-07-18 NOTE — ROUTINE PROCESS
Bedside shift change report given to Burton olguin (oncoming nurse) by Rolanda Soto (offgoing nurse). Report included the following information SBAR, Kardex, Intake/Output and MAR.

## 2019-07-18 NOTE — PROGRESS NOTES
[facility]  [address] [facility] [address]  [phone]  Colon and Rectal Surgery Progress Note      Patient: Sonny Ng MRN: [id number]  SSN: [id number]    YOB: 1950  Age: 79 y.o. Sex: female      Admit Date: 7/16/2019    LOS: 2 days     Subjective:   Patient did well overnight. Denies abdominal pain, N/V. +Flatulence and BM. Objective:     Vitals:    07/17/19 1500 07/17/19 1700 07/17/19 2157 07/18/19 0601   BP: 107/56 100/55 111/57 142/79   Pulse: 85 84 78 88   Resp: 19 20 18 16   Temp: 98 °F (36.7 °C) 97.4 °F (36.3 °C) 97 °F (36.1 °C) 97.4 °F (36.3 °C)   SpO2: 97% 96% 97% 97%   Weight:       Height:            Intake and Output:  Current Shift: No intake/output data recorded. Last three shifts: 07/16 1901 - 07/18 0700  In: 1972.5 [P.O.:840;  I.V.:1132.5]  Out: 2460 [Urine:2360]    Physical Exam:   Abdomen: Soft, non-distended, non-tender; incision sites c/d/i    Lab/Data Review:  Recent Results (from the past 12 hour(s))   METABOLIC PANEL, BASIC    Collection Time: 07/18/19  5:10 AM   Result Value Ref Range    Sodium 143 136 - 145 mmol/L    Potassium 3.4 (L) 3.5 - 5.5 mmol/L    Chloride 112 (H) 100 - 111 mmol/L    CO2 26 21 - 32 mmol/L    Anion gap 5 3.0 - 18 mmol/L    Glucose 92 74 - 99 mg/dL    BUN 8 7.0 - 18 MG/DL    Creatinine 0.62 0.6 - 1.3 MG/DL    BUN/Creatinine ratio 13 12 - 20      GFR est AA >60 >60 ml/min/1.73m2    GFR est non-AA >60 >60 ml/min/1.73m2    Calcium 8.2 (L) 8.5 - 10.1 MG/DL   CBC W/O DIFF    Collection Time: 07/18/19  5:10 AM   Result Value Ref Range    WBC 9.4 4.6 - 13.2 K/uL    RBC 3.25 (L) 4.20 - 5.30 M/uL    HGB 10.1 (L) 12.0 - 16.0 g/dL    HCT 29.7 (L) 35.0 - 45.0 %    MCV 91.4 74.0 - 97.0 FL    MCH 31.1 24.0 - 34.0 PG    MCHC 34.0 31.0 - 37.0 g/dL    RDW 13.6 11.6 - 14.5 %    PLATELET 439 895 - 148 K/uL    MPV 10.3 9.2 - 11.8 FL   MAGNESIUM    Collection Time: 07/18/19  5:10 AM   Result Value Ref Range    Magnesium 1.6 1.6 - 2.6 mg/dL PHOSPHORUS    Collection Time: 07/18/19  5:10 AM   Result Value Ref Range    Phosphorus 1.1 (L) 2.5 - 4.9 MG/DL       Assessment:   Recurrent Diverticulitis    POD#2: Robotic Sigmoid Colectomy     Plan:   - Advance to regular, low fiber diet for dinner  - Encourage ambulation  - Replace K+ and Phos per protocol  - Continue daily CBC, BMP, Mag, and Phos    Natan Jaquez MD, PGY-2  [address] [facility]  07/18/19 10:00 AM

## 2019-07-18 NOTE — PROGRESS NOTES
Problem: Patient Education: Go to Patient Education Activity  Goal: Patient/Family Education  Outcome: Progressing Towards Goal     Problem: Surgical Pathway Post-Op Day 1  Goal: Off Pathway (Use only if patient is Off Pathway)  Outcome: Progressing Towards Goal     Problem: Falls - Risk of  Goal: *Absence of Falls  Description  Document Candida Lightning Fall Risk and appropriate interventions in the flowsheet.   Outcome: Progressing Towards Goal  Note:   Fall Risk Interventions:            Medication Interventions: Patient to call before getting OOB, Teach patient to arise slowly

## 2019-07-18 NOTE — PROGRESS NOTES
NUTRITION    Nursing Referral: [facility]     RECOMMENDATIONS / PLAN:     - Monitor diet tolerance. - Change nutritional supplements to Ensure Enlive TID  - Educated patient on diet after discharge. - Continue RD inpatient monitoring and evaluation. NUTRITION INTERVENTIONS & DIAGNOSIS:     [x] Meals/snacks: modified composition  [x] Medical food supplement therapy: modify  [x] Nutrition Education: low fiber and high fiber diets    Nutrition Diagnosis: Malnutrition related to inadequate energy intake as evidenced by pt consuming <75 of energy needs x 3 months and 6% weight loss x 1 month. Patient meets criteria for Severe Protein Calorie Malnutrition as evidenced by:   ASPEN Malnutrition Criteria  Acute Illness, Chronic Illness, or Social/Enviornmental: Acute illness  Energy Intake: Less than/equal to 75% of est energy req for greater than/equal to 1 month  Weight Loss: Greater than 5% x 1 mo  ASPEN Malnutrition Score - Chronic Illness: 12  Chronic Illness - Malnutrition Diagnosis: Severe malnutrition. ASSESSMENT:     7/18: +BM and flatus. Diet advanced for breakfast and order to be advanced again for dinner. K and phos replaced. Agreeable to Ensure Enlive. 7/17: S/p sigmoid colectomy for diverticulitis on 7/16. Clear liquid diet started for lunch. Poor meal intake PTA 2/2 nausea, consuming <50% of 2 meals per day, portions and foods discussed. Consumed Boost, usually 1 per day. Agreeable to supplements in house, discharge diet and supplement rec discussed.      Average po intake adequate to meet patients estimated nutritional needs:   [] Yes     [x] No   [] Unable to determine at this time    Diet: DIET NUTRITIONAL SUPPLEMENTS All Meals, Breakfast, Lunch, Dinner; [employer] CLEAR  DIET FULL LIQUID  DIET REGULAR Low Fiber      Food Allergies: NKFA  Current Appetite:   [] Good     [x] Fair     [] Poor     [] Other:  Appetite/meal intake prior to admission:   [] Good     [] Fair     [x] Poor     [] Other:  Feeding Limitations:  [] Swallowing difficulty    [] Chewing difficulty    [] Other:  Current Meal Intake: Patient Vitals for the past 100 hrs:   % Diet Eaten   07/17/19 0840 0 %       BM: 7/18  Skin Integrity: surgical incision to abdomen  Edema:   [x] No     [] Yes   Pertinent Medications: Reviewed: zofran, pepcid    Recent Labs     07/18/19  0510 07/17/19  0458    138   K 3.4* 3.5   * 108   CO2 26 25   GLU 92 134*   BUN 8 8   CREA 0.62 0.68   CA 8.2* 7.4*   MG 1.6 1.5*   PHOS 1.1* 3.0       Intake/Output Summary (Last 24 hours) at 7/18/2019 1117  Last data filed at 7/18/2019 0640  Gross per 24 hour   Intake 940 ml   Output 1300 ml   Net -360 ml       Anthropometrics:  Ht Readings from Last 1 Encounters:   07/16/19 5' (1.524 m)     Last 3 Recorded Weights in this Encounter    07/12/19 1202 07/16/19 1119   Weight: 49.9 kg (110 lb) 49.4 kg (109 lb)     Body mass index is 21.29 kg/m². Weight History: -6% x 1 month, -12% x 3-4 months    Weight Metrics 7/16/2019 6/27/2019 6/13/2019 6/9/2019 5/30/2019 5/13/2019 3/29/2019   Weight 109 lb 112 lb 116 lb 115 lb 119 lb 122 lb 12.8 oz 124 lb   BMI 21.29 kg/m2 21.87 kg/m2 22.65 kg/m2 22.46 kg/m2 23.24 kg/m2 23.98 kg/m2 24.22 kg/m2        Admitting Diagnosis: Diverticulitis [K57.92]  Pertinent PMHx: depression    Education Needs:        [] None identified  [] Identified - Not appropriate at this time  [x]  Identified and addressed - refer to education log  Learning Limitations:   [x] None identified  [] Identified    Cultural, Hinduism & ethnic food preferences:  [x] None identified    [] Identified and addressed     ESTIMATED NUTRITION NEEDS:     Calories: 6881-3986 kcal (MSJx1.2-1.3) based on  [x] Actual BW 49 kg      [] IBW   Protein: 49-59 gm (1-1.2 gm/kg) based on  [x] Actual BW      [] IBW   Fluid: 1 mL/kcal     MONITORING & EVALUATION:     Nutrition Goal(s):   1.  Po intake of meals will meet >75% of patient estimated nutritional needs within the next 7 days.   Outcome:  [] Met/Ongoing    [x] Progressing towards goal    [] Not progressing towards goal    [] New/Initial goal     Monitoring:   [x] Food and beverage intake   [x] Diet order   [x] Nutrition-focused physical findings   [x] Treatment/therapy   [] Weight   [] Enteral nutrition intake        Previous Recommendations (for follow-up assessments only):     [x]   Implemented       []   Not Implemented (RD to address)      [] No Longer Appropriate     [] No Recommendation Made     Discharge Planning: high fiber diet, pending diet tolerance + Ensure BID  [x] Participated in care planning, discharge planning, & interdisciplinary rounds as appropriate      Xavier Monroy, [address]   Pager: [phone]

## 2019-07-18 NOTE — PROGRESS NOTES
Problem: Patient Education: Go to Patient Education Activity  Goal: Patient/Family Education  Outcome: Progressing Towards Goal     Problem: Surgical Pathway Day of Surgery  Goal: Off Pathway (Use only if patient is Off Pathway)  Outcome: Progressing Towards Goal  Goal: Activity/Safety  Outcome: Progressing Towards Goal  Goal: Consults, if ordered  Outcome: Progressing Towards Goal  Goal: Nutrition/Diet  Outcome: Progressing Towards Goal  Goal: Medications  Outcome: Progressing Towards Goal  Goal: Respiratory  Outcome: Progressing Towards Goal  Goal: Treatments/Interventions/Procedures  Outcome: Progressing Towards Goal  Goal: Psychosocial  Outcome: Progressing Towards Goal  Goal: *No signs and symptoms of infection or wound complications  Outcome: Progressing Towards Goal  Goal: *Optimal pain control at patient's stated goal  Outcome: Progressing Towards Goal  Goal: *Adequate urinary output (equal to or greater than 30 milliliters/hour)  Description  Ambulatory Surgery patients voiding without difficulty.   Outcome: Progressing Towards Goal  Goal: *Hemodynamically stable  Outcome: Progressing Towards Goal  Goal: *Tolerating diet  Outcome: Progressing Towards Goal  Goal: *Demonstrates progressive activity  Outcome: Progressing Towards Goal     Problem: Surgical Pathway Post-Op Day 1  Goal: Off Pathway (Use only if patient is Off Pathway)  Outcome: Progressing Towards Goal  Goal: Activity/Safety  Outcome: Progressing Towards Goal  Goal: Diagnostic Test/Procedures  Outcome: Progressing Towards Goal  Goal: Nutrition/Diet  Outcome: Progressing Towards Goal  Goal: Discharge Planning  Outcome: Progressing Towards Goal  Goal: Medications  Outcome: Progressing Towards Goal  Goal: Respiratory  Outcome: Progressing Towards Goal  Goal: Treatments/Interventions/Procedures  Outcome: Progressing Towards Goal  Goal: Psychosocial  Outcome: Progressing Towards Goal  Goal: *No signs and symptoms of infection or wound complications  Outcome: Progressing Towards Goal  Goal: *Optimal pain control at patient's stated goal  Outcome: Progressing Towards Goal  Goal: *Adequate urinary output (equal to or greater than 30 milliliters/hour)  Outcome: Progressing Towards Goal  Goal: *Hemodynamically stable  Outcome: Progressing Towards Goal  Goal: *Tolerating diet  Outcome: Progressing Towards Goal  Goal: *Demonstrates progressive activity  Outcome: Progressing Towards Goal  Goal: *Lungs clear or at baseline  Outcome: Progressing Towards Goal     Problem: Surgical Pathway Post-Op Day 2 through Discharge  Goal: Off Pathway (Use only if patient is Off Pathway)  Outcome: Progressing Towards Goal  Goal: Activity/Safety  Outcome: Progressing Towards Goal  Goal: Nutrition/Diet  Outcome: Progressing Towards Goal  Goal: Discharge Planning  Outcome: Progressing Towards Goal  Goal: Medications  Outcome: Progressing Towards Goal  Goal: Respiratory  Outcome: Progressing Towards Goal  Goal: Treatments/Interventions/Procedures  Outcome: Progressing Towards Goal  Goal: Psychosocial  Outcome: Progressing Towards Goal  Goal: *No signs and symptoms of infection or wound complications  Outcome: Progressing Towards Goal  Goal: *Optimal pain control at patient's stated goal  Outcome: Progressing Towards Goal  Goal: *Adequate urinary output (equal to or greater than 30 milliliters/hour)  Outcome: Progressing Towards Goal  Goal: *Hemodynamically stable  Outcome: Progressing Towards Goal  Goal: *Tolerating diet  Outcome: Progressing Towards Goal  Goal: *Demonstrates progressive activity  Outcome: Progressing Towards Goal  Goal: *Lungs clear or at baseline  Outcome: Progressing Towards Goal     Problem: Falls - Risk of  Goal: *Absence of Falls  Description  Document Zeb Fall Risk and appropriate interventions in the flowsheet.   Outcome: Progressing Towards Goal  Note:   Fall Risk Interventions:            Medication Interventions: Evaluate medications/consider consulting pharmacy, Patient to call before getting OOB, Teach patient to arise slowly                   Problem: Patient Education: Go to Patient Education Activity  Goal: Patient/Family Education  Outcome: Progressing Towards Goal     Problem: Infection - Risk of, Surgical Site Infection  Goal: *Absence of surgical site infection signs and symptoms  Outcome: Progressing Towards Goal     Problem: Patient Education: Go to Patient Education Activity  Goal: Patient/Family Education  Outcome: Progressing Towards Goal     Problem: Deep Venous Thrombosis - Risk of  Goal: *Absence of deep venous thrombosis signs and symptoms(Stroke Metric)  Outcome: Progressing Towards Goal  Goal: *Absence of impaired coagulation signs and symptoms  Outcome: Progressing Towards Goal  Goal: *Knowledge of prescribed medications  Outcome: Progressing Towards Goal  Goal: *Absence of bleeding  Outcome: Progressing Towards Goal     Problem: Patient Education: Go to Patient Education Activity  Goal: Patient/Family Education  Outcome: Progressing Towards Goal     Problem: Nutrition Deficit  Goal: *Optimize nutritional status  Outcome: Progressing Towards Goal

## 2019-07-19 VITALS
RESPIRATION RATE: 16 BRPM | TEMPERATURE: 97.1 F | HEART RATE: 80 BPM | HEIGHT: 60 IN | DIASTOLIC BLOOD PRESSURE: 73 MMHG | WEIGHT: 109 LBS | SYSTOLIC BLOOD PRESSURE: 122 MMHG | BODY MASS INDEX: 21.4 KG/M2 | OXYGEN SATURATION: 97 %

## 2019-07-19 LAB
ANION GAP SERPL CALC-SCNC: 6 MMOL/L (ref 3–18)
BUN SERPL-MCNC: 7 MG/DL (ref 7–18)
BUN/CREAT SERPL: 13 (ref 12–20)
CALCIUM SERPL-MCNC: 7.5 MG/DL (ref 8.5–10.1)
CHLORIDE SERPL-SCNC: 112 MMOL/L (ref 100–111)
CO2 SERPL-SCNC: 26 MMOL/L (ref 21–32)
CREAT SERPL-MCNC: 0.54 MG/DL (ref 0.6–1.3)
ERYTHROCYTE [DISTWIDTH] IN BLOOD BY AUTOMATED COUNT: 13.7 % (ref 11.6–14.5)
GLUCOSE SERPL-MCNC: 81 MG/DL (ref 74–99)
HCT VFR BLD AUTO: 27.6 % (ref 35–45)
HGB BLD-MCNC: 9.4 G/DL (ref 12–16)
MAGNESIUM SERPL-MCNC: 1.6 MG/DL (ref 1.6–2.6)
MCH RBC QN AUTO: 31 PG (ref 24–34)
MCHC RBC AUTO-ENTMCNC: 34.1 G/DL (ref 31–37)
MCV RBC AUTO: 91.1 FL (ref 74–97)
PHOSPHATE SERPL-MCNC: 2 MG/DL (ref 2.5–4.9)
PLATELET # BLD AUTO: 267 K/UL (ref 135–420)
PMV BLD AUTO: 9.9 FL (ref 9.2–11.8)
POTASSIUM SERPL-SCNC: 3 MMOL/L (ref 3.5–5.5)
RBC # BLD AUTO: 3.03 M/UL (ref 4.2–5.3)
SODIUM SERPL-SCNC: 144 MMOL/L (ref 136–145)
WBC # BLD AUTO: 7 K/UL (ref 4.6–13.2)

## 2019-07-19 PROCEDURE — 74011250636 HC RX REV CODE- 250/636: Performed by: STUDENT IN AN ORGANIZED HEALTH CARE EDUCATION/TRAINING PROGRAM

## 2019-07-19 PROCEDURE — 36415 COLL VENOUS BLD VENIPUNCTURE: CPT

## 2019-07-19 PROCEDURE — 80048 BASIC METABOLIC PNL TOTAL CA: CPT

## 2019-07-19 PROCEDURE — 84100 ASSAY OF PHOSPHORUS: CPT

## 2019-07-19 PROCEDURE — 83735 ASSAY OF MAGNESIUM: CPT

## 2019-07-19 PROCEDURE — 74011000250 HC RX REV CODE- 250: Performed by: COLON & RECTAL SURGERY

## 2019-07-19 PROCEDURE — 74011000250 HC RX REV CODE- 250: Performed by: STUDENT IN AN ORGANIZED HEALTH CARE EDUCATION/TRAINING PROGRAM

## 2019-07-19 PROCEDURE — 74011250636 HC RX REV CODE- 250/636: Performed by: COLON & RECTAL SURGERY

## 2019-07-19 PROCEDURE — 85027 COMPLETE CBC AUTOMATED: CPT

## 2019-07-19 PROCEDURE — 74011250637 HC RX REV CODE- 250/637: Performed by: COLON & RECTAL SURGERY

## 2019-07-19 RX ORDER — CELECOXIB 100 MG/1
100 CAPSULE ORAL 2 TIMES DAILY
Qty: 14 CAP | Refills: 0 | Status: SHIPPED | OUTPATIENT
Start: 2019-07-19 | End: 2019-07-26

## 2019-07-19 RX ORDER — ACETAMINOPHEN 500 MG
1000 TABLET ORAL EVERY 6 HOURS
Qty: 56 TAB | Refills: 0 | Status: SHIPPED | OUTPATIENT
Start: 2019-07-19 | End: 2019-07-26

## 2019-07-19 RX ORDER — GABAPENTIN 300 MG/1
300 CAPSULE ORAL 3 TIMES DAILY
Qty: 21 CAP | Refills: 0 | Status: SHIPPED | OUTPATIENT
Start: 2019-07-19 | End: 2019-07-26

## 2019-07-19 RX ORDER — POTASSIUM CHLORIDE 7.45 MG/ML
10 INJECTION INTRAVENOUS
Status: DISPENSED | OUTPATIENT
Start: 2019-07-19 | End: 2019-07-19

## 2019-07-19 RX ADMIN — HEPARIN SODIUM 5000 UNITS: 5000 INJECTION, SOLUTION INTRAVENOUS; SUBCUTANEOUS at 08:43

## 2019-07-19 RX ADMIN — GABAPENTIN 300 MG: 300 CAPSULE ORAL at 08:44

## 2019-07-19 RX ADMIN — GABAPENTIN 300 MG: 300 CAPSULE ORAL at 15:56

## 2019-07-19 RX ADMIN — ACETAMINOPHEN 500 MG: 500 TABLET ORAL at 12:43

## 2019-07-19 RX ADMIN — HEPARIN SODIUM 5000 UNITS: 5000 INJECTION, SOLUTION INTRAVENOUS; SUBCUTANEOUS at 16:01

## 2019-07-19 RX ADMIN — POTASSIUM CHLORIDE 10 MEQ: 10 INJECTION, SOLUTION INTRAVENOUS at 12:38

## 2019-07-19 RX ADMIN — POTASSIUM CHLORIDE 10 MEQ: 10 INJECTION, SOLUTION INTRAVENOUS at 12:00

## 2019-07-19 RX ADMIN — POTASSIUM CHLORIDE 10 MEQ: 10 INJECTION, SOLUTION INTRAVENOUS at 11:00

## 2019-07-19 RX ADMIN — ACETAMINOPHEN 1000 MG: 500 TABLET ORAL at 06:27

## 2019-07-19 RX ADMIN — FAMOTIDINE 20 MG: 10 INJECTION, SOLUTION INTRAVENOUS at 08:43

## 2019-07-19 RX ADMIN — CELECOXIB 100 MG: 100 CAPSULE ORAL at 08:44

## 2019-07-19 RX ADMIN — SODIUM PHOSPHATE, MONOBASIC, MONOHYDRATE: 276; 142 INJECTION, SOLUTION INTRAVENOUS at 12:00

## 2019-07-19 NOTE — PROGRESS NOTES
Patients is alert awake and oriented, area to abdomen steri strips dry and intact. No s/s of nausea or distress and sob.

## 2019-07-19 NOTE — PROGRESS NOTES
D/C orders received. No needs identified by . Chart reviewed. Pt will be transported home by daughter.  available as needed.     Mike Harvey, RN  Care Manager  [phone]

## 2019-07-19 NOTE — DISCHARGE SUMMARY
Colon and Rectal Surgery Discharge Summary     Patient: Anne Salguero MRN: [id number]  SSN: [id number]    YOB: 1950  Age: 79 y.o. Sex: female       Admit Date: 7/16/2019    Discharge Date: 7/19/2019      Admission Diagnoses: Diverticulitis [K57.92]    Discharge Diagnoses: Diverticulitis    Procedure: Robotic Sigmoid Colectomy    Problem List as of 7/19/2019 Date Reviewed: 6/27/2019          Codes Class Noted - Resolved    Diverticulitis ICD-10-CM: E34.51  ICD-9-CM: 562.11  7/16/2019 - Present        Mixed hyperlipidemia ICD-10-CM: E78.2  ICD-9-CM: 272.2  4/28/2017 - Present        Depression (Chronic) ICD-10-CM: F32.9  ICD-9-CM: 681  9/2/2014 - Present               Discharge Condition: Good    Hospital Course: Patient presented with symptomatic diverticulosis, so she was taken to the OR for a robotic sigmoid colectomy. She tolerated procedure well. Her pain was well controlled and her meals were advanced to regular, low fiber diet. She was discharged home. Consults: None    Significant Diagnostic Studies: None. Disposition: home    Discharge Medications:   Current Discharge Medication List      START taking these medications    Details   celecoxib (CELEBREX) 100 mg capsule Take 1 Cap by mouth two (2) times a day for 7 days. Qty: 14 Cap, Refills: 0      gabapentin (NEURONTIN) 300 mg capsule Take 1 Cap by mouth three (3) times daily for 7 days. Max Daily Amount: 900 mg. Qty: 21 Cap, Refills: 0    Associated Diagnoses: Diverticulitis      acetaminophen (TYLENOL) 500 mg tablet Take 2 Tabs by mouth every six (6) hours for 7 days. Qty: 56 Tab, Refills: 0         CONTINUE these medications which have NOT CHANGED    Details   ondansetron (ZOFRAN ODT) 4 mg disintegrating tablet Take 1 Tab by mouth every eight (8) hours as needed for Nausea. Qty: 14 Tab, Refills: 0      hydrocortisone (ANUSOL-HC) 2.5 % rectal cream Insert  into rectum four (4) times daily.   Qty: 30 g, Refills: 0 Associated Diagnoses: Hemorrhoids, unspecified hemorrhoid type      cetirizine (ZYRTEC) 10 mg tablet Take 1 Tab by mouth daily. Qty: 30 Tab, Refills: 3    Associated Diagnoses: Acute effusion of left ear      ALPRAZolam (XANAX) 0.25 mg tablet Take 1 Tab by mouth daily as needed for Anxiety. Qty: 5 Tab, Refills: 0    Comments: Air travel  Associated Diagnoses: Situational anxiety             Activity: No heavy lifting (nothing more than 10-15 lbs)    Diet: Low fiber diet. Wound Care: Showers over your wounds are ok, but avoid submerging wounds in pools or baths. The strips over your wound will fall off on their own or I will remove them in the office    Follow-up Appointments   Procedures    FOLLOW UP VISIT Appointment in: Two Weeks     Standing Status:   Standing     Number of Occurrences:   1     Order Specific Question:   Appointment in     Answer: 617 Arelis Taylor MD, PGY-2  [address] [facility]  07/19/19 11:14 AM

## 2019-07-19 NOTE — DISCHARGE INSTRUCTIONS
[employer] Activation    Thank you for requesting access to [employer]. Please follow the instructions below to securely access and download your online medical record. [employer] allows you to send messages to your doctor, view your test results, renew your prescriptions, schedule appointments, and more. How Do I Sign Up? 1. In your internet browser, go to [email or web address]  2. Click on the First Time User? Click Here link in the Sign In box. You will be redirect to the New Member Sign Up page. 3. Enter your [employer] Access Code exactly as it appears below. You will not need to use this code after youve completed the sign-up process. If you do not sign up before the expiration date, you must request a new code. [employer] Access Code: [email or web address]  Expires: 2019 12:16 PM (This is the date your [employer] access code will )    4. Enter the last four digits of your Social Security Number (xxxx) and Date of Birth (mm/dd/yyyy) as indicated and click Submit. You will be taken to the next sign-up page. 5. Create a [employer] ID. This will be your [employer] login ID and cannot be changed, so think of one that is secure and easy to remember. 6. Create a [employer] password. You can change your password at any time. 7. Enter your Password Reset Question and Answer. This can be used at a later time if you forget your password. 8. Enter your e-mail address. You will receive e-mail notification when new information is available in [address]. 9. Click Sign Up. You can now view and download portions of your medical record. 10. Click the Download Summary menu link to download a portable copy of your medical information. Additional Information    If you have questions, please visit the Frequently Asked Questions section of the [employer] website at [email or web address] Remember, [employer] is NOT to be used for urgent needs. For medical emergencies, dial 911.       Patient armband removed and shredded    DISCHARGE SUMMARY from Nurse    PATIENT INSTRUCTIONS:    After general anesthesia or intravenous sedation, for 24 hours or while taking prescription Narcotics:  · Limit your activities  · Do not drive and operate hazardous machinery  · Do not make important personal or business decisions  · Do  not drink alcoholic beverages  · If you have not urinated within 8 hours after discharge, please contact your surgeon on call. Report the following to your surgeon:  · Excessive pain, swelling, redness or odor of or around the surgical area  · Temperature over 100.5  · Nausea and vomiting lasting longer than 4 hours or if unable to take medications  · Any signs of decreased circulation or nerve impairment to extremity: change in color, persistent  numbness, tingling, coldness or increase pain  · Any questions    What to do at Home:  Recommended activity: Activity as tolerated, No heavy lifting, pushing, pulling and No driving while on analgesics. Please follow up with Dr. Marah Oviedo in 2 weeks. *  Please give a list of your current medications to your Primary Care Provider. *  Please update this list whenever your medications are discontinued, doses are      changed, or new medications (including over-the-counter products) are added. *  Please carry medication information at all times in case of emergency situations. These are general instructions for a healthy lifestyle:    No smoking/ No tobacco products/ Avoid exposure to second hand smoke  Surgeon General's Warning:  Quitting smoking now greatly reduces serious risk to your health.     Obesity, smoking, and sedentary lifestyle greatly increases your risk for illness    A healthy diet, regular physical exercise & weight monitoring are important for maintaining a healthy lifestyle    You may be retaining fluid if you have a history of heart failure or if you experience any of the following symptoms:  Weight gain of 3 pounds or more overnight or 5 pounds in a week, increased swelling in our hands or feet or shortness of breath while lying flat in bed. Please call your doctor as soon as you notice any of these symptoms; do not wait until your next office visit. The discharge information has been reviewed with the patient. The patient verbalized understanding. Discharge medications reviewed with the patient and appropriate educational materials and side effects teaching were provided. ___________________________________________________________________________________________________________________________________No heavy lifting (nothing more than 10-15 lbs)    Low fiber diet - avoid raw fruits and vegetables, but small amounts of cooked vegetables are ok. Eat white bread products instead of wheat bread products. Meats, fish, chicken, noodles are ok. Showers over your wounds are ok, but avoid submerging wounds in pools or baths.  The strips over your wound will fall off on their own or I will remove them in the office    Make an office follow up appointment in 2 weeks

## 2019-07-19 NOTE — PROGRESS NOTES
[facility] [address]  [address] [facility] [address]  [phone]  Colon and Rectal Surgery Progress Note      Patient: Basil Dhaliwal MRN: [id number]  SSN: [id number]    YOB: 1950  Age: 79 y.o. Sex: female      Admit Date: 7/16/2019    LOS: 3 days     Subjective:   Patient did well overnight. Denies abdominal pain, N/V. +Flatulence and BM. Objective:     Vitals:    07/18/19 1925 07/18/19 2236 07/19/19 0200 07/19/19 0544   BP: 140/78 150/87 116/68 144/84   Pulse: 88 86 78 69   Resp: 16 12 12 12   Temp: 97.6 °F (36.4 °C) 97.7 °F (36.5 °C) 98 °F (36.7 °C) 97.6 °F (36.4 °C)   SpO2: 98% 98% 97% 98%   Weight:       Height:            Intake and Output:  Current Shift: No intake/output data recorded. Last three shifts: 07/17 1901 - 07/19 0700  In: 36 [P.O.:720;  I.V.:100]  Out: 1100 [Urine:1100]    Physical Exam:   Abdomen: Soft, non-distended, mildly tender; incision sites c/d/i    Lab/Data Review:  Recent Results (from the past 12 hour(s))   METABOLIC PANEL, BASIC    Collection Time: 07/19/19  4:15 AM   Result Value Ref Range    Sodium 144 136 - 145 mmol/L    Potassium 3.0 (L) 3.5 - 5.5 mmol/L    Chloride 112 (H) 100 - 111 mmol/L    CO2 26 21 - 32 mmol/L    Anion gap 6 3.0 - 18 mmol/L    Glucose 81 74 - 99 mg/dL    BUN 7 7.0 - 18 MG/DL    Creatinine 0.54 (L) 0.6 - 1.3 MG/DL    BUN/Creatinine ratio 13 12 - 20      GFR est AA >60 >60 ml/min/1.73m2    GFR est non-AA >60 >60 ml/min/1.73m2    Calcium 7.5 (L) 8.5 - 10.1 MG/DL   CBC W/O DIFF    Collection Time: 07/19/19  4:15 AM   Result Value Ref Range    WBC 7.0 4.6 - 13.2 K/uL    RBC 3.03 (L) 4.20 - 5.30 M/uL    HGB 9.4 (L) 12.0 - 16.0 g/dL    HCT 27.6 (L) 35.0 - 45.0 %    MCV 91.1 74.0 - 97.0 FL    MCH 31.0 24.0 - 34.0 PG    MCHC 34.1 31.0 - 37.0 g/dL    RDW 13.7 11.6 - 14.5 %    PLATELET 349 177 - 608 K/uL    MPV 9.9 9.2 - 11.8 FL   MAGNESIUM    Collection Time: 07/19/19  4:15 AM   Result Value Ref Range    Magnesium 1.6 1.6 - 2.6 mg/dL PHOSPHORUS    Collection Time: 07/19/19  4:15 AM   Result Value Ref Range    Phosphorus 2.0 (L) 2.5 - 4.9 MG/DL       Assessment:   Recurrent Diverticulitis: POD#3: Robotic Sigmoid Colectomy   Hypokalemia: K+ currently 3.0  Hypophosphotemia: Phos currently 2.0    Plan:   - Home today after potasium and phosphate repletion      Natan Bajwa MD, PGY-2  [address] [facility]  07/19/19 8:29 AM

## 2019-07-19 NOTE — PROGRESS NOTES
Problem: Surgical Pathway Day of Surgery  Goal: Off Pathway (Use only if patient is Off Pathway)  Outcome: Progressing Towards Goal     Problem: Falls - Risk of  Goal: *Absence of Falls  Description  Document Doris Escobar Fall Risk and appropriate interventions in the flowsheet.   Outcome: Progressing Towards Goal  Note:   Fall Risk Interventions:            Medication Interventions: Patient to call before getting OOB, Teach patient to arise slowly    Elimination Interventions: Call light in reach, Patient to call for help with toileting needs              Problem: Patient Education: Go to Patient Education Activity  Goal: Patient/Family Education  Outcome: Progressing Towards Goal     Problem: Patient Education: Go to Patient Education Activity  Goal: Patient/Family Education  Outcome: Progressing Towards Goal

## 2019-07-19 NOTE — ROUTINE PROCESS
Bedside shift change report given to AMOL Yu (oncoming nurse) by Tomi Jacobson RN (offgoing nurse). Report included the following information SBAR, Procedure Summary and MAR.

## 2019-07-21 PROBLEM — J30.9 ALLERGIC RHINITIS: Status: ACTIVE | Noted: 2019-07-21

## 2019-07-21 PROBLEM — E87.6 HYPOKALEMIA: Status: ACTIVE | Noted: 2019-07-21

## 2019-07-21 PROBLEM — D64.9 ANEMIA: Status: ACTIVE | Noted: 2019-07-21

## 2019-07-22 ENCOUNTER — OFFICE VISIT (OUTPATIENT)
Dept: INTERNAL MEDICINE CLINIC | Age: 69
End: 2019-07-22

## 2019-07-22 ENCOUNTER — HOSPITAL ENCOUNTER (OUTPATIENT)
Dept: LAB | Age: 69
Discharge: HOME | End: 2019-07-22
Payer: MEDICARE

## 2019-07-22 ENCOUNTER — PATIENT OUTREACH (OUTPATIENT)
Dept: INTERNAL MEDICINE CLINIC | Age: 69
End: 2019-07-22

## 2019-07-22 VITALS
RESPIRATION RATE: 14 BRPM | TEMPERATURE: 97.2 F | WEIGHT: 117.6 LBS | HEIGHT: 60 IN | DIASTOLIC BLOOD PRESSURE: 65 MMHG | BODY MASS INDEX: 23.09 KG/M2 | HEART RATE: 75 BPM | OXYGEN SATURATION: 98 % | SYSTOLIC BLOOD PRESSURE: 139 MMHG

## 2019-07-22 DIAGNOSIS — Z11.59 NEED FOR HEPATITIS C SCREENING TEST: ICD-10-CM

## 2019-07-22 DIAGNOSIS — E83.51 HYPOCALCEMIA: ICD-10-CM

## 2019-07-22 DIAGNOSIS — E78.2 MIXED HYPERLIPIDEMIA: ICD-10-CM

## 2019-07-22 DIAGNOSIS — K57.92 DIVERTICULITIS: Primary | ICD-10-CM

## 2019-07-22 DIAGNOSIS — J30.9 ALLERGIC RHINITIS, UNSPECIFIED SEASONALITY, UNSPECIFIED TRIGGER: ICD-10-CM

## 2019-07-22 DIAGNOSIS — R49.0 HOARSENESS: ICD-10-CM

## 2019-07-22 DIAGNOSIS — E87.6 HYPOKALEMIA: ICD-10-CM

## 2019-07-22 DIAGNOSIS — Z00.00 INITIAL MEDICARE ANNUAL WELLNESS VISIT: ICD-10-CM

## 2019-07-22 DIAGNOSIS — Z12.31 ENCOUNTER FOR SCREENING MAMMOGRAM FOR BREAST CANCER: ICD-10-CM

## 2019-07-22 DIAGNOSIS — Z78.0 POSTMENOPAUSAL: ICD-10-CM

## 2019-07-22 LAB
ANION GAP SERPL CALC-SCNC: 6 MMOL/L (ref 3–18)
BUN SERPL-MCNC: 9 MG/DL (ref 7–18)
BUN/CREAT SERPL: 15 (ref 12–20)
CALCIUM SERPL-MCNC: 7.8 MG/DL (ref 8.5–10.1)
CHLORIDE SERPL-SCNC: 109 MMOL/L (ref 100–111)
CHOLEST SERPL-MCNC: 130 MG/DL
CO2 SERPL-SCNC: 27 MMOL/L (ref 21–32)
CREAT SERPL-MCNC: 0.59 MG/DL (ref 0.6–1.3)
GLUCOSE SERPL-MCNC: 101 MG/DL (ref 74–99)
HDLC SERPL-MCNC: 39 MG/DL (ref 40–60)
HDLC SERPL: 3.3 {RATIO} (ref 0–5)
LDLC SERPL CALC-MCNC: 60 MG/DL (ref 0–100)
LIPID PROFILE,FLP: ABNORMAL
POTASSIUM SERPL-SCNC: 3.9 MMOL/L (ref 3.5–5.5)
SODIUM SERPL-SCNC: 142 MMOL/L (ref 136–145)
TRIGL SERPL-MCNC: 155 MG/DL (ref ?–150)
VLDLC SERPL CALC-MCNC: 31 MG/DL

## 2019-07-22 PROCEDURE — 82306 VITAMIN D 25 HYDROXY: CPT

## 2019-07-22 PROCEDURE — 80048 BASIC METABOLIC PNL TOTAL CA: CPT

## 2019-07-22 PROCEDURE — 80061 LIPID PANEL: CPT

## 2019-07-22 PROCEDURE — 86803 HEPATITIS C AB TEST: CPT

## 2019-07-22 RX ORDER — HYDROCODONE BITARTRATE AND ACETAMINOPHEN 5; 325 MG/1; MG/1
TABLET ORAL
Refills: 0 | COMMUNITY
Start: 2019-06-18 | End: 2019-07-22 | Stop reason: ALTCHOICE

## 2019-07-22 RX ORDER — AMOXICILLIN AND CLAVULANATE POTASSIUM 875; 125 MG/1; MG/1
TABLET, FILM COATED ORAL
Refills: 0 | COMMUNITY
Start: 2019-05-09 | End: 2019-07-22 | Stop reason: ALTCHOICE

## 2019-07-22 NOTE — ACP (ADVANCE CARE PLANNING)
Advance Care Planning  Advance Care Planning (ACP) Provider Conversation     Date of ACP Conversation: 07/22/19  Persons included in Conversation:  patient    Authorized Decision Maker (if patient is incapable of making informed decisions): This person is:   Healthcare Agent/Medical Power of  under Advance Directive - see below    For Patients with Decision Making Capacity:   Values/Goals: Exploration of values, goals, and preferences if recovery is not expected, even with continued medical treatment in the event of:  Imminent death  Severe, permanent brain injury    Conversation Outcomes / Follow-Up Plan:   Recommended completion of Advance Directive form after review of ACP materials and conversation with prospective healthcare agent . She wants her children to be her POAs. 1 of her daughters is Shanthi December. Her information was already in our EHR. I confirmed her phone number with the patient. Her other daughter is Aliya Hill ([phone]). Her son is Stephan Capellan ([phone]). Russel Whitley She was given an advance directive. We encouraged her to complete her advance directive so that it can be scanned into the EHR.     Dr. Heidi Luna  [facility], [address] [place] [address].  Phone: [phone]  Fax: [phone]

## 2019-07-22 NOTE — PROGRESS NOTES
Chief Complaint   Patient presents with   [facility] Follow Up     [facility] admission on 7-16-19 for Diverticulitis        1. Have you been to the ER, urgent care clinic since your last visit? Hospitalized since your last visit? No    2. Have you seen or consulted any other health care providers outside of the [address] [place] [address] since your last visit? Include any pap smears or colon screening. No    Patient was given a copy of the Advanced Directive and understands to bring it in once completed.   Health Maintenance Due   Topic Date Due    Hepatitis C Screening  1950    DTaP/Tdap/Td series (1 - Tdap) 12/04/1973    Shingrix Vaccine Age 50> (1 of 2) 12/04/2002    GLAUCOMA SCREENING Q2Y  12/04/2017    Bone Densitometry (Dexa) Screening  12/04/2017    MEDICARE YEARLY EXAM  11/12/2018    BREAST CANCER SCRN MAMMOGRAM  05/12/2019

## 2019-07-22 NOTE — PROGRESS NOTES
TRANSITIONS OF CARE FACE-TO-FACE VISIT  INTERNISTS OF [facility]:  7/22/2019, MRN: [id number]  Chief Complaint   Patient presents with   [facility] Follow Up     [facility] admission on 7-16-19 for Diverticulitis          Wale Morris is a 79 y.o. female and presents to clinic after recent hospitalization. Subjective:   Discharged from: [facility]    Summary of hospitalization problems/diagnoses: The patient is a 31-year-old female with history of allergic rhinitis, hemorrhoids, cataracts, HLD, diverticulitis, and depression/anxiety (rarely has anxiety with flying). 1. Diverticulitis: The patient presented to the hospital given recurrent symptomatic diverticulosis. She underwent a robotic sigmoid colectomy. Her f/u apt with : later this month. She is passing flatus. Having BMs normally. Abdominal pain is improving day by day. No emesis. No hematochezia or melena. No fever or chills. No SOB. Taking tylenol prn. Her potassium was mildly low when checked in the hospital.  She was also anemic. Her calcium was also low. 7/7/19 Abdomen CT: Suggestion of mild proximal sigmoid diverticulitis. Diffuse mild colonic wall thickening, increased at the right colon since prior exam. Stable mild dilatation of the common bile duct. 2. Allergic rhinitis: She reports a 4 month h/o a tickle in her throat. She has occasional drainage. She has associated hoarseness off and on. No alleviating factors are known. No triggers are known. No other symptoms. 3. HLD: Her last cholesterol measurements revealed that her LDL was in the 160s range. She is on cholesterol-lowering medication at this time. Current Outpatient Medications on File Prior to Visit   Medication Sig Dispense Refill    celecoxib (CELEBREX) 100 mg capsule Take 1 Cap by mouth two (2) times a day for 7 days. 14 Cap 0    gabapentin (NEURONTIN) 300 mg capsule Take 1 Cap by mouth three (3) times daily for 7 days.  Max Daily Amount: 900 mg. 21 Cap 0    acetaminophen (TYLENOL) 500 mg tablet Take 2 Tabs by mouth every six (6) hours for 7 days. 56 Tab 0    ondansetron (ZOFRAN ODT) 4 mg disintegrating tablet Take 1 Tab by mouth every eight (8) hours as needed for Nausea. 14 Tab 0    ALPRAZolam (XANAX) 0.25 mg tablet Take 1 Tab by mouth daily as needed for Anxiety. 5 Tab 0    hydrocortisone (ANUSOL-HC) 2.5 % rectal cream Insert  into rectum four (4) times daily. (Patient taking differently: Insert  into rectum four (4) times daily as needed.) 30 g 0    amoxicillin-clavulanate (AUGMENTIN) 875-125 mg per tablet TAKE 1 TABLET BY MOUTH TWICE A DAY  0    HYDROcodone-acetaminophen (NORCO) 5-325 mg per tablet TK 1 T PO Q 4 H PRN P FOR UP TO 3 DAYS  0    cetirizine (ZYRTEC) 10 mg tablet Take 1 Tab by mouth daily. (Patient taking differently: Take 10 mg by mouth daily as needed.) 30 Tab 3     No current facility-administered medications on file prior to visit. Medication changes: yes  Medication list updated:  yes  Needs referral or labs:  no  Treatment Barriers: no      Patient Active Problem List    Diagnosis Date Noted    Allergic rhinitis 07/21/2019    Hypokalemia 07/21/2019    Anemia 07/21/2019    Diverticulitis 07/16/2019    Mixed hyperlipidemia 04/28/2017    Depression 09/02/2014       Current Outpatient Medications   Medication Sig Dispense Refill    celecoxib (CELEBREX) 100 mg capsule Take 1 Cap by mouth two (2) times a day for 7 days. 14 Cap 0    gabapentin (NEURONTIN) 300 mg capsule Take 1 Cap by mouth three (3) times daily for 7 days. Max Daily Amount: 900 mg. 21 Cap 0    acetaminophen (TYLENOL) 500 mg tablet Take 2 Tabs by mouth every six (6) hours for 7 days. 56 Tab 0    ondansetron (ZOFRAN ODT) 4 mg disintegrating tablet Take 1 Tab by mouth every eight (8) hours as needed for Nausea. 14 Tab 0    ALPRAZolam (XANAX) 0.25 mg tablet Take 1 Tab by mouth daily as needed for Anxiety.  5 Tab 0    hydrocortisone (ANUSOL-HC) 2.5 % rectal cream Insert  into rectum four (4) times daily. (Patient taking differently: Insert  into rectum four (4) times daily as needed.) 30 g 0    amoxicillin-clavulanate (AUGMENTIN) 875-125 mg per tablet TAKE 1 TABLET BY MOUTH TWICE A DAY  0    HYDROcodone-acetaminophen (NORCO) 5-325 mg per tablet TK 1 T PO Q 4 H PRN P FOR UP TO 3 DAYS  0    cetirizine (ZYRTEC) 10 mg tablet Take 1 Tab by mouth daily. (Patient taking differently: Take 10 mg by mouth daily as needed.) 30 Tab 3       Allergies   Allergen Reactions    Septra [Sulfamethoprim Ds] Rash    Sulfamethoxazole-Trimethoprim Rash       Past Medical History:   Diagnosis Date    Depression     Diverticulitis        Past Surgical History:   Procedure Laterality Date    FLEXIBLE SIGMOIDOSCOPY N/A 7/16/2019    SIGMOIDOSCOPY FLEXIBLE (No endo tech needed) performed by Moon Avalos MD at [address] [place] [address] HX COLONOSCOPY  2007; 2/2019    neg; 2nd colo showed diverticula    HX OTHER SURGICAL      Right arm    LAP,SURG,COLECTOMY,W/ANAST N/A 07/16/2019    Dr. Ankit Benjamin       Family History   Problem Relation Age of Onset    Hypertension Father     Heart Disease Father     No Known Problems Mother     Heart Attack Brother         age 48    Heart Attack Paternal Grandfather         age 72    Heart Attack Brother         age 62       Social History     Tobacco Use    Smoking status: Never Smoker    Smokeless tobacco: Never Used   Substance Use Topics    Alcohol use: Yes     Comment: occassionally       ROS   Review of Systems   Constitutional: Negative for chills and fever. HENT: Negative for congestion, ear pain and sore throat. Eyes: Negative for blurred vision and pain. Respiratory: Positive for cough. Negative for shortness of breath. Cardiovascular: Negative for chest pain. Gastrointestinal: Positive for abdominal pain (improving). Negative for blood in stool and melena.    Genitourinary: Negative for dysuria and hematuria. Musculoskeletal: Negative for joint pain and myalgias. Skin: Negative for rash. Neurological: Negative for tingling, focal weakness and headaches. Endo/Heme/Allergies: Positive for environmental allergies. Does not bruise/bleed easily. Psychiatric/Behavioral: Negative for substance abuse. Objective     Vitals:    07/22/19 1040   BP: 139/65   Pulse: 75   Resp: 14   Temp: 97.2 °F (36.2 °C)   TempSrc: Oral   SpO2: 98%   Weight: 117 lb 9.6 oz (53.3 kg)   Height: 5' (1.524 m)   PainSc:   0 - No pain       Physical Exam   Constitutional: She is oriented to person, place, and time and well-developed, well-nourished, and in no distress. HENT:   Head: Normocephalic and atraumatic. Right Ear: External ear normal.   Left Ear: External ear normal.   Nose: Nose normal.   Mouth/Throat: Oropharynx is clear and moist. No oropharyngeal exudate. Eyes: Conjunctivae and EOM are normal. Right eye exhibits no discharge. Left eye exhibits no discharge. No scleral icterus. Neck: Neck supple. Cardiovascular: Normal rate, regular rhythm, normal heart sounds and intact distal pulses. Exam reveals no gallop and no friction rub. No murmur heard. Pulmonary/Chest: Effort normal and breath sounds normal. No respiratory distress. She has no wheezes. She has no rales. Abdominal: Soft. Bowel sounds are normal. She exhibits no distension. There is no tenderness. There is no rebound and no guarding. Musculoskeletal: She exhibits no edema or tenderness (BUE). Lymphadenopathy:     She has no cervical adenopathy. Neurological: She is alert and oriented to person, place, and time. She exhibits normal muscle tone. Gait normal.   Skin: Skin is warm and dry. No erythema. Surgical scars are intact and without erythema. Psychiatric: Affect normal.   Nursing note and vitals reviewed.       LABS   Data Review:   Lab Results   Component Value Date/Time    WBC 7.0 07/19/2019 04:15 AM    HGB 9.4 (L) 07/19/2019 04:15 AM    HCT 27.6 (L) 07/19/2019 04:15 AM    PLATELET 036 84/00/5372 04:15 AM    MCV 91.1 07/19/2019 04:15 AM       Lab Results   Component Value Date/Time    Sodium 144 07/19/2019 04:15 AM    Potassium 3.0 (L) 07/19/2019 04:15 AM    Chloride 112 (H) 07/19/2019 04:15 AM    CO2 26 07/19/2019 04:15 AM    Anion gap 6 07/19/2019 04:15 AM    Glucose 81 07/19/2019 04:15 AM    BUN 7 07/19/2019 04:15 AM    Creatinine 0.54 (L) 07/19/2019 04:15 AM    BUN/Creatinine ratio 13 07/19/2019 04:15 AM    GFR est AA >60 07/19/2019 04:15 AM    GFR est non-AA >60 07/19/2019 04:15 AM    Calcium 7.5 (L) 07/19/2019 04:15 AM       Lab Results   Component Value Date/Time    Cholesterol, total 255 (H) 04/28/2017 08:35 AM    HDL Cholesterol 78 04/28/2017 08:35 AM    LDL, calculated 161 (H) 04/28/2017 08:35 AM    VLDL, calculated 16 04/28/2017 08:35 AM    Triglyceride 78 04/28/2017 08:35 AM    CHOL/HDL Ratio 2.9 06/08/2010 02:47 PM       No results found for: HBA1C, HGBE8, KBG9RLOQ, RTI5RTQV, PKR8FLWS    Assessment/Plan:   Lab review: labs are reviewed  Referrals: not needed  Complexity: Moderate    Community resources identified for patient: none needed  Durable Medical Equipment: none needed    1. Mixed hyperlipidemia  Checking a BMP and a lipid panel    ORDERS:  - METABOLIC PANEL, BASIC; Future  - LIPID PANEL; Future    2. Diverticulitis: S/p sigmoid colectomy  - Continue to f/u with . 3. Hypokalemia: Stable. - Checking a BMP    ORDERS:  - METABOLIC PANEL, BASIC; Future    4. Hypocalcemia: Stable. - Checking a BMP and vitamin D level    ORDERS:  - METABOLIC PANEL, BASIC; Future  - VITAMIN D, 25 HYDROXY; Future    5. Allergic rhinitis: Occasional hoarseness. Placing a referral to ENT. I encouraged her to use Flonase daily. If she is still having symptoms, I instructed her to get a chest x-ray. ORDERS:  - REFERRAL TO ENT-OTOLARYNGOLOGY  - XR CHEST AP LAT; Future      A. Key points we discussed today:  1.  Pt instructed to call our office if symptoms worsen or if the pt/caregiver has any questions. 2. Handout given       Orders Placed This Encounter    amoxicillin-clavulanate (AUGMENTIN) 875-125 mg per tablet     Sig: TAKE 1 TABLET BY MOUTH TWICE A DAY     Refill:  0    HYDROcodone-acetaminophen (NORCO) 5-325 mg per tablet     Sig: TK 1 T PO Q 4 H PRN P FOR UP TO 3 DAYS     Refill:  0       B. New labs/medications ordered today:   1. A new medication list was given to the patient/family/caregiver. The medication list has been updated. A new medication list was given today to the patient. I have discussed the diagnosis with the patient and the intended plan as seen in the above orders. The patient has received an after-visit summary and questions were answered concerning future plans. I have discussed medication side effects and warnings with the patient as well. I have reviewed the plan of care with the patient, accepted their input and they are in agreement with the treatment goals. All questions were answered. The patient understands the plan of care. Handouts provided today with above information. Pt instructed if symptoms worsen to call the office or report to the ED for continued care. Greater than 50% of the visit time was spent in counseling and/or coordination of care.

## 2019-07-22 NOTE — PROGRESS NOTES
INTERNISTS OF [facility]:  07/22/19, [id number]      The Initial Medicare Annual Wellness Exam PROGRESS NOTE    This is an Initial Medicare Annual Wellness Exam (AWV). I have reviewed the patient's medical history in detail and updated the computerized patient record. Jd uDnlap is a 79 y.o.  female and presents for an annual wellness exam.    SUBJECTIVE    Past Medical History:   Diagnosis Date    Depression     Diverticulitis       Past Surgical History:   Procedure Laterality Date    FLEXIBLE SIGMOIDOSCOPY N/A 7/16/2019    SIGMOIDOSCOPY FLEXIBLE (No endo tech needed) performed by Felicia Verdin MD at [address] [place] [address] HX COLONOSCOPY  2007; 2/2019    neg; 2nd colo showed diverticula    HX OTHER SURGICAL      Right arm    LAP,SURG,COLECTOMY,W/ANAST N/A 07/16/2019    Dr. Estrada Ards     Current Outpatient Medications   Medication Sig Dispense Refill    celecoxib (CELEBREX) 100 mg capsule Take 1 Cap by mouth two (2) times a day for 7 days. 14 Cap 0    gabapentin (NEURONTIN) 300 mg capsule Take 1 Cap by mouth three (3) times daily for 7 days. Max Daily Amount: 900 mg. 21 Cap 0    acetaminophen (TYLENOL) 500 mg tablet Take 2 Tabs by mouth every six (6) hours for 7 days. 56 Tab 0    ondansetron (ZOFRAN ODT) 4 mg disintegrating tablet Take 1 Tab by mouth every eight (8) hours as needed for Nausea. 14 Tab 0    ALPRAZolam (XANAX) 0.25 mg tablet Take 1 Tab by mouth daily as needed for Anxiety. 5 Tab 0    hydrocortisone (ANUSOL-HC) 2.5 % rectal cream Insert  into rectum four (4) times daily.  (Patient taking differently: Insert  into rectum four (4) times daily as needed.) 30 g 0     Allergies   Allergen Reactions    Septra [Sulfamethoprim Ds] Rash    Sulfamethoxazole-Trimethoprim Rash     Family History   Problem Relation Age of Onset    Hypertension Father     Heart Disease Father     No Known Problems Mother     Heart Attack Brother         age 49    Heart Attack Paternal Grandfather age 72    Heart Attack Brother         age 62     Social History     Tobacco Use    Smoking status: Never Smoker    Smokeless tobacco: Never Used   Substance Use Topics    Alcohol use: Yes     Comment: occassionally     Patient Active Problem List   Diagnosis Code    Depression F32.9    Mixed hyperlipidemia E78.2    Diverticulitis K57.92    Allergic rhinitis J30.9    Hypokalemia E87.6    Anemia D64.9      The patient is a 59-year-old female with history of allergic rhinitis, hemorrhoids, cataracts, HLD, diverticulitis, and depression/anxiety (rarely has anxiety with flying). Health Maintenance History  Immunizations reviewed: Tdap over-due   Pneumovax: up to date   Flu: flu season is over at present  Zoster: over-due    Immunization History   Administered Date(s) Administered    Influenza Vaccine (Quad) PF 02/03/2016, 01/08/2018    Influenza Vaccine (Tri) Adjuvanted 11/14/2018    Pneumococcal Conjugate (PCV-13) 04/03/2018    Pneumococcal Polysaccharide (PPSV-23) 01/02/2016       Colonoscopy: Up to date. No hematochezia/melena     Eye exam: Up to date but we do not have records    Mammo: Overdue. No breast pain/masses. Dexascan: Overdue    Pelvic/Pap: No vaginal bleeding. Review of Systems   Constitutional: Negative for chills and fever. HENT: Negative for ear pain and sore throat. Eyes: Negative for blurred vision and pain. Respiratory: Negative for shortness of breath. Cardiovascular: Negative for chest pain. Gastrointestinal: Positive for abdominal pain (improving). Negative for blood in stool and melena. Genitourinary: Negative for dysuria and urgency. Musculoskeletal: Negative for joint pain and myalgias. Skin: Negative for rash. Neurological: Negative for tingling, focal weakness and headaches. Endo/Heme/Allergies: Positive for environmental allergies. Does not bruise/bleed easily. Psychiatric/Behavioral: Negative for substance abuse.        Depression Risk Factor Screening:      Patient Health Questionnaire (PHQ-2)   Over the last 2 weeks, how often have you been bothered by any of the following problems? · Little interest or pleasure in doing things? · Not at all. [0]  · Feeling down, depressed, or hopeless? · Not at all. [0]    Total Score: 0/6  PHQ-2 Assessment Scoring:   A score of 2 or more requires further screening with the PHQ-9    Alcohol Risk Factor Screening:   Women: On any occasion during the past 3 months, have you had more than 3 drinks containing alcohol? no    Do you average more than 7 drinks per week? no    Tobacco Use Screening:     Social History     Tobacco Use   Smoking Status Never Smoker   Smokeless Tobacco Never Used       Hearing Loss    Hearing is good. Activities of Daily Living   Self-care. Requires assistance with: no ADLs  She does not need assistance with ADLs/IADLs    Fall Risk   She had one fall in the past yr and broke her arm. Abuse Screen   Patient is not abused  None    Additional Examination Findings:  Vitals:    07/22/19 1040   BP: 139/65   Pulse: 75   Resp: 14   Temp: 97.2 °F (36.2 °C)   TempSrc: Oral   SpO2: 98%   Weight: 117 lb 9.6 oz (53.3 kg)   Height: 5' (1.524 m)   PainSc:   0 - No pain      Body mass index is 22.97 kg/m². Evaluation of Cognitive Function:  Mood/affect: Euthymic  Appearance: Well-groomed  Family member/caregiver input: She is not accompanied by a family member      General:   Well-nourished, well-groomed, pleasant, alert, in no acute distress. Head:  Normocephalic, atraumatic  Ears:  External ears WNL  Eyes:  Clear sclera  Neuro:   Alert, conversant, appropriate, following commands, no sensory deficit   Skin:    No rashes noted  Psych:  Affect, mood and judgment appropriate      Dementia Screen (Mini-Cog):   Three Item Recall: 3/3  Clock Drawing (ten past eleven) Exercise: Unremarkable      LABS   Data Review:   Lab Results   Component Value Date/Time    Sodium 144 07/19/2019 04:15 AM    Potassium 3.0 (L) 07/19/2019 04:15 AM    Chloride 112 (H) 07/19/2019 04:15 AM    CO2 26 07/19/2019 04:15 AM    Anion gap 6 07/19/2019 04:15 AM    Glucose 81 07/19/2019 04:15 AM    BUN 7 07/19/2019 04:15 AM    Creatinine 0.54 (L) 07/19/2019 04:15 AM    BUN/Creatinine ratio 13 07/19/2019 04:15 AM    GFR est AA >60 07/19/2019 04:15 AM    GFR est non-AA >60 07/19/2019 04:15 AM    Calcium 7.5 (L) 07/19/2019 04:15 AM       Lab Results   Component Value Date/Time    WBC 7.0 07/19/2019 04:15 AM    HGB 9.4 (L) 07/19/2019 04:15 AM    HCT 27.6 (L) 07/19/2019 04:15 AM    PLATELET 493 27/44/1164 04:15 AM    MCV 91.1 07/19/2019 04:15 AM       No results found for: HBA1C, HGBE8, RNY0GJBU, MWS1XCBD    Lab Results   Component Value Date/Time    Cholesterol, total 255 (H) 04/28/2017 08:35 AM    HDL Cholesterol 78 04/28/2017 08:35 AM    LDL, calculated 161 (H) 04/28/2017 08:35 AM    VLDL, calculated 16 04/28/2017 08:35 AM    Triglyceride 78 04/28/2017 08:35 AM    CHOL/HDL Ratio 2.9 06/08/2010 02:47 PM       Patient Care Team:  Bentley Zelaya NP as PCP - General (Internal Medicine)  REHAN Carver MD as Physician (Colon and Rectal Surgery)    End-of-life planning  Advanced Directive in the case than an injury or illness causes the patient to be unable to make health care decisions was discussed with the patient. Advice/Referrals/Counselling/Plan:   Education and counseling provided:  Are appropriate based on today's review and evaluation  End-of-Life planning (with patient's consent)  Pneumococcal Vaccine  Influenza Vaccine  Screening Mammography  Screening Pap and pelvic (covered once every 2 years)  Colorectal cancer screening tests  Cardiovascular screening blood test  Bone mass measurement (DEXA)  Screening for glaucoma  Diabetes screening test     Include in education list (weight loss, physical activity, smoking cessation, fall prevention, and nutrition)    ICD-10-CM ICD-9-CM    1. Encounter for screening mammogram for breast cancer Z12.31 V76.12 [facility] MAMMO BI SCREENING INCL CAD   2. Postmenopausal Z78.0 V49.81 DEXA BONE DENSITY STUDY AXIAL   3. Mixed hyperlipidemia B64.1 158.2 METABOLIC PANEL, BASIC      LIPID PANEL   4. Diverticulitis K57.92 562.11    5. Hypokalemia B21.0 048.5 METABOLIC PANEL, BASIC   6. Hypocalcemia X54.16 633.49 METABOLIC PANEL, BASIC      VITAMIN D, 25 HYDROXY   7. Allergic rhinitis, unspecified seasonality, unspecified trigger J30.9 477.9 REFERRAL TO ENT-OTOLARYNGOLOGY      XR CHEST AP LAT   8. Hoarseness R49.0 784.42 REFERRAL TO ENT-OTOLARYNGOLOGY      XR CHEST AP LAT   9. Need for hepatitis C screening test Z11.59 V73.89 HEPATITIS C AB     reviewed diet, exercise and weight control. Brief written plan, checklist    Assessment/Plan:    Health Maintenance:  Ordering a mammogram to screen for breast cancer. Ordering a bone density study to rule out osteoporosis. Requesting her last formal eye exam.  Screening for hepatitis C based on her date of birth per CDC recommendations. Fall risk reduction measures were discussed today. ORDERS:  - [facility] MAMMO BI SCREENING INCL CAD; Future  - DEXA BONE DENSITY STUDY AXIAL; Future  - HEPATITIS C AB; Future    Lab review: labs are reviewed in the [address] [place] [address] (ACP) Provider Conversation     Date of ACP Conversation: 07/22/19  Persons included in Conversation:  patient    Authorized Decision Maker (if patient is incapable of making informed decisions):    This person is:   Healthcare Agent/Medical Power of  under Advance Directive - see below    For Patients with Decision Making Capacity:   Values/Goals: Exploration of values, goals, and preferences if recovery is not expected, even with continued medical treatment in the event of:  Imminent death  Severe, permanent brain injury    Conversation Outcomes / Follow-Up Plan:   Recommended completion of Advance Directive form after review of ACP materials and conversation with prospective healthcare agent . She wants her children to be her POAs. 1 of her daughters is Sunshine Malloy. Her information was already in our EHR. I confirmed her phone number with the patient. Her other daughter is Anila Del Toro ([phone]). Her son is Jacobo Fuentes ([phone]). Liana Edwards She was given an advance directive. We encouraged her to complete her advance directive so that it can be scanned into the EHR. I have discussed the diagnosis with the patient and the intended plan as seen in the above orders. The patient has received an after-visit summary and questions were answered concerning future plans. I have discussed medication side effects and warnings with the patient as well. I have reviewed the plan of care with the patient, accepted their input and they are in agreement with the treatment goals. Follow-up and Dispositions    · Return in about 3 months (around 10/22/2019).

## 2019-07-22 NOTE — PATIENT INSTRUCTIONS
Health Maintenance Due   Topic Date Due    Hepatitis C Screening  1950    DTaP/Tdap/Td series (1 - Tdap) 1973    Shingrix Vaccine Age 50> (1 of 2) 2002    GLAUCOMA SCREENING Q2Y  2017    Bone Densitometry (Dexa) Screening  2017    MEDICARE YEARLY EXAM  2018    BREAST CANCER SCRN MAMMOGRAM  2019          Allergies: Care Instructions  Your Care Instructions    Allergies occur when your body's defense system (immune system) overreacts to certain substances. The immune system treats a harmless substance as if it were a harmful germ or virus. Many things can cause this overreaction, including pollens, medicine, food, dust, animal dander, and mold. Allergies can be mild or severe. Mild allergies can be managed with home treatment. But medicine may be needed to prevent problems. Managing your allergies is an important part of staying healthy. Your doctor may suggest that you have allergy testing to help find out what is causing your allergies. When you know what things trigger your symptoms, you can avoid them. This can prevent allergy symptoms and other health problems. For severe allergies that cause reactions that affect your whole body (anaphylactic reactions), your doctor may prescribe a shot of epinephrine to carry with you in case you have a severe reaction. Learn how to give yourself the shot and keep it with you at all times. Make sure it is not . Follow-up care is a key part of your treatment and safety. Be sure to make and go to all appointments, and call your doctor if you are having problems. It's also a good idea to know your test results and keep a list of the medicines you take. How can you care for yourself at home? · If you have been told by your doctor that dust or dust mites are causing your allergy, decrease the dust around your bed:  ? Wash sheets, pillowcases, and other bedding in hot water every week. ?  Use dust-proof covers for pillows, duvets, and mattresses. Avoid plastic covers because they tear easily and do not \"breathe. \" Wash as instructed on the label. ? Do not use any blankets and pillows that you do not need. ? Use blankets that you can wash in your washing machine. ? Consider removing drapes and carpets, which attract and hold dust, from your bedroom. · If you are allergic to house dust and mites, do not use home humidifiers. Your doctor can suggest ways you can control dust and mites. · Look for signs of cockroaches. Cockroaches cause allergic reactions. Use cockroach baits to get rid of them. Then, clean your home well. Cockroaches like areas where grocery bags, newspapers, empty bottles, or cardboard boxes are stored. Do not keep these inside your home, and keep trash and food containers sealed. Seal off any spots where cockroaches might enter your home. · If you are allergic to mold, get rid of furniture, rugs, and drapes that smell musty. Check for mold in the bathroom. · If you are allergic to outdoor pollen or mold spores, use air-conditioning. Change or clean all filters every month. Keep windows closed. · If you are allergic to pollen, stay inside when pollen counts are high. Use a vacuum  with a HEPA filter or a double-thickness filter at least two times each week. · Stay inside when air pollution is bad. Avoid paint fumes, perfumes, and other strong odors. · Avoid conditions that make your allergies worse. Stay away from smoke. Do not smoke or let anyone else smoke in your house. Do not use fireplaces or wood-burning stoves. · If you are allergic to your pets, change the air filter in your furnace every month. Use high-efficiency filters. · If you are allergic to pet dander, keep pets outside or out of your bedroom. Old carpet and cloth furniture can hold a lot of animal dander. You may need to replace them. When should you call for help?   Give an epinephrine shot if:    · You think you are having a severe allergic reaction.     · You have symptoms in more than one body area, such as mild nausea and an itchy mouth.    After giving an epinephrine shot call 911, even if you feel better.   Call 911 if:    · You have symptoms of a severe allergic reaction. These may include:  ? Sudden raised, red areas (hives) all over your body. ? Swelling of the throat, mouth, lips, or tongue. ? Trouble breathing. ? Passing out (losing consciousness). Or you may feel very lightheaded or suddenly feel weak, confused, or restless.     · You have been given an epinephrine shot, even if you feel better.    Call your doctor now or seek immediate medical care if:    · You have symptoms of an allergic reaction, such as:  ? A rash or hives (raised, red areas on the skin). ? Itching. ? Swelling. ? Belly pain, nausea, or vomiting.    Watch closely for changes in your health, and be sure to contact your doctor if:    · You do not get better as expected. Where can you learn more? Go to http://ronald-hung.info/. Enter R745 in the search box to learn more about \"Allergies: Care Instructions. \"  Current as of: January 21, 2019  Content Version: 12.1  © 2798-2598 [employer]. Care instructions adapted under license by [employer] (which disclaims liability or warranty for this information). If you have questions about a medical condition or this instruction, always ask your healthcare professional. [place] [address] any warranty or liability for your use of this information. Medicare Part B Preventive Services Limitations Recommendation Scheduled   Bone Mass Measurement  (age 72 & older, biennial) Requires diagnosis related to osteoporosis or estrogen deficiency.  Biennial benefit unless patient has history of long-term glucocorticoid tx or baseline is needed because initial test was by other method This should be done at age 72 and again if on osteoporosis medication at 2-3 year intervals. Ordered today   Cardiovascular Screening Blood Tests (every 5 years)  Total cholesterol, HDL, Triglycerides Order as a panel if possible We should check your cholesterol panel at least once every 5 years. Up to date   Colorectal Cancer Screening  -Fecal occult blood test (annual)  -Flexible sigmoidoscopy (5y)  -Screening colonoscopy (10y)  -Barium Enema  Due per your Gastroenterologist's recommendations. Up to date   Counseling to Prevent Tobacco Use (up to 8 sessions per year)  - Counseling greater than 3 and up to 10 minutes  - Counseling greater than 10 minutes Patients must be asymptomatic of tobacco-related conditions to receive as preventive service Continue with smoking cessation    Diabetes Screening Tests (at least every 3 years, Medicare covers annually or at 6-month intervals for prediabetic patients)    Fasting blood sugar (FBS) or glucose tolerance test (GTT) Patient must be diagnosed with one of the following:  -Hypertension, Dyslipidemia, obesity, previous impaired FBS or GTT  Or any two of the following: overweight, FH of diabetes, age ? 72, history of gestational diabetes, birth of baby weighing more than 9 pounds Should be done yearly Up to date   Diabetes Self-Management Training (DSMT) (no USPSTF recommendation) Requires referral by treating physician for patient with diabetes or renal disease. 10 hours of initial DSMT session of no less than 30 minutes each in a continuous 12-month period. 2 hours of follow-up DSMT in subsequent years. Not applicable Not applicable   Glaucoma Screening (no USPSTF recommendation) Diabetes mellitus, family history, , age 48 or over,  American, age 72 or over Continue with annual eye exams.  Up to date   Human Immunodeficiency Virus (HIV) Screening (annually for increased risk patients)  HIV-1 and HIV-2 by EIA, THALIA, rapid antibody test, or oral mucosa transudate Patient must be at increased risk for HIV infection per USPSTF guidelines or pregnant. Tests covered annually for patients at increased risk. Pregnant patients may receive up to 3 test during pregnancy. Not applicable Not applicable   Medical Nutrition Therapy (MNT) (for diabetes or renal disease not recommended schedule) Requires referral by treating physician for patient with diabetes or renal disease. Can be provided in same year as diabetes self-management training (DSMT), and CMS recommends medical nutrition therapy take place after DSMT. Up to 3 hours for initial year and 2 hours in subsequent years. Not applicable Not applicable   Shingles Vaccination A shingles vaccine is also recommended once in a lifetime after age 61 Vaccination recommended for shingles vaccination. Overdue   Seasonal Influenza Vaccination (annually)  Continue with yearly \"flu\" shot annually Flu season has not started yet   Pneumococcal Vaccination (once after 65)  Please receive this vaccination at age 72. Up to date   Hepatitis B Vaccinations (if medium/high risk) Medium/high risk factors:  End-stage renal disease,  Hemophiliacs who received Factor VIII or IX concentrates, Clients of institutions for the mentally retarded, Persons who live in the same house as a HepB virus carrier, Homosexual men, Illicit injectable drug abusers. Not applicable Not applicable   Screening Mammography (biennial age 54-69) Annually (age 36 or over) You need a mammogram yearly to screen for breast cancer. Ordered today   Screening Pap Tests and Pelvic Examination (up to age 79 and after 79 if unknown history or abnormal study last 10 years) Every 24 months except high risk You need no Pap smear at this time. Not warranted   Ultrasound Screening for Abdominal Aortic Aneurysm (AAA) (once) Patient must be referred through IPPE and not have had a screening for abdominal aortic aneurysm before under Medicare.   Limited to patients who meet one of the following criteria:  - Men who are 73-68 years old and have smoked more than 100 cigarettes in their lifetime.  -Anyone with a FH of AAA  -Anyone recommended for screening by USPSTF Not applicable Not applicable       Medicare Wellness Visit, Female     The best way to live healthy is to have a lifestyle where you eat a well-balanced diet, exercise regularly, limit alcohol use, and quit all forms of tobacco/nicotine, if applicable. Regular preventive services are another way to keep healthy. Preventive services (vaccines, screening tests, monitoring & exams) can help personalize your care plan, which helps you manage your own care. Screening tests can find health problems at the earliest stages, when they are easiest to treat. [facility] follows the current, evidence-based guidelines published by the [facility] ([facility]) when recommending preventive services for our patients. Because we follow these guidelines, sometimes recommendations change over time as research supports it. (For example, mammograms used to be recommended annually. Even though Medicare will still pay for an annual mammogram, the newer guidelines recommend a mammogram every two years for women of average risk.)  Of course, you and your doctor may decide to screen more often for some diseases, based on your risk and your health status. Preventive services for you include:  - Medicare offers their members a free annual wellness visit, which is time for you and your primary care provider to discuss and plan for your preventive service needs. Take advantage of this benefit every year!  -All adults over the age of 72 should receive the recommended pneumonia vaccines. Current USPSTF guidelines recommend a series of two vaccines for the best pneumonia protection.   -All adults should have a flu vaccine yearly and a tetanus vaccine every 10 years.  All adults age 61 and older should receive a shingles vaccine once in their lifetime.    -A bone mass density test is recommended when a woman turns 72 to screen for osteoporosis. This test is only recommended one time, as a screening. Some providers will use this same test as a disease monitoring tool if you already have osteoporosis. -All adults age 38-68 who are overweight should have a diabetes screening test once every three years.   -Other screening tests and preventive services for persons with diabetes include: an eye exam to screen for diabetic retinopathy, a kidney function test, a foot exam, and stricter control over your cholesterol.   -Cardiovascular screening for adults with routine risk involves an electrocardiogram (ECG) at intervals determined by your doctor.   -Colorectal cancer screenings should be done for adults age 54-65 with no increased risk factors for colorectal cancer. There are a number of acceptable methods of screening for this type of cancer. Each test has its own benefits and drawbacks. Discuss with your doctor what is most appropriate for you during your annual wellness visit. The different tests include: colonoscopy (considered the best screening method), a fecal occult blood test, a fecal DNA test, and sigmoidoscopy. -Breast cancer screenings are recommended every other year for women of normal risk, age 54-69.  -Cervical cancer screenings for women over age 72 are only recommended with certain risk factors.   -All adults born between [facility] should be screened once for Hepatitis C.      Here is a list of your current Health Maintenance items (your personalized list of preventive services) with a due date:  Health Maintenance Due   Topic Date Due    Hepatitis C Test  1950    DTaP/Tdap/Td  (1 - Tdap) 12/04/1973    Shingles Vaccine (1 of 2) 12/04/2002    Glaucoma Screening   12/04/2017    Bone Mineral Density   12/04/2017    Annual Well Visit  11/12/2018    Mammogram  05/12/2019

## 2019-07-22 NOTE — PROGRESS NOTES
Hospital Discharge Follow-Up      Date/Time:  7/22/2019 2:54 PM    Patient was admitted to [facility] on 7/16/19 and discharged on 7/19/19 for diverticulitis s/p robotic sigmoid colectomy. The physician discharge summary was available at the time of outreach. Patient was seen by PCP for a face to face visit within 1 business day of discharge. Inpatient RRAT score: 8  Was this a readmission? no   Patient stated reason for the readmission: N/A         Advance Care Planning:   Does patient have an Advance Directive:  not on file     Medication(s):   New Medications at Discharge: Celebrex, Neurontin, Tylenol  Changed Medications at Discharge: None  Discontinued Medications at Discharge: None    Current Outpatient Medications   Medication Sig    celecoxib (CELEBREX) 100 mg capsule Take 1 Cap by mouth two (2) times a day for 7 days.  gabapentin (NEURONTIN) 300 mg capsule Take 1 Cap by mouth three (3) times daily for 7 days. Max Daily Amount: 900 mg.  acetaminophen (TYLENOL) 500 mg tablet Take 2 Tabs by mouth every six (6) hours for 7 days.  ondansetron (ZOFRAN ODT) 4 mg disintegrating tablet Take 1 Tab by mouth every eight (8) hours as needed for Nausea.  ALPRAZolam (XANAX) 0.25 mg tablet Take 1 Tab by mouth daily as needed for Anxiety.  hydrocortisone (ANUSOL-HC) 2.5 % rectal cream Insert  into rectum four (4) times daily. (Patient taking differently: Insert  into rectum four (4) times daily as needed.)     No current facility-administered medications for this visit. There are no discontinued medications.     [facility] follow up appointment(s):   Future Appointments   Date Time Provider Manasa Meza   8/8/2019 10:30 AM Layla Ponce  [address]   8/16/2019  9:35 AM [facility] NURSE VISIT [facility]   10/21/2019  8:30 AM Fidel Mcgrath MD [address]      Non-[facility] follow up appointment(s): N/A  [facility]:  out of service area       Goals      Attends follow-up appointments as directed. 7/22: Patient attended transition of care appt as scheduled.  Prevent complications post hospitalization. NN writer will monitor/assess patient 30-60 days post discharge.

## 2019-07-23 LAB
25(OH)D3 SERPL-MCNC: 22.7 NG/ML (ref 30–100)
HCV AB SER IA-ACNC: 0.02 INDEX
HCV AB SERPL QL IA: NEGATIVE
HCV COMMENT,HCGAC: NORMAL

## 2019-07-23 NOTE — CDMP QUERY
Pt admitted with diverticulitis and has severe protein calorie malnutrition documented by Rd (registered dietician). Conflicting documentation noted in H&P as \"She appears well-developed and well-nourished. \" After further study, do you concur with the findings of severe protein calorie malnutrition noted in the RD Consultation note?  Malnutrition (mild, moderate, severe)    Protein calorie malnutrition (mild, moderate, severe)    Other (please specify)   Unable to determine  ? Other Explanation of the clinical findings  ? Clinically Undetermined (no explanation for clinical findings)    The medical record reflects the following clinical findings, risk factors and treatment:     Risk Factors:  Appetite documented as fair,     Clinical Indicators: RD noted;  Malnutrition related to inadequate energy intake as evidenced by pt consuming <75 of energy needs x 3 months and 6% weight loss x 1 month, BMI 21.29 kg/m2, Weight History: -6% x 1 month, -12% x 3-4 months     Treatment: RD recommendations/Plan: Monitor diet tolerance,  Change nutritional supplements to Ensure Enlive TID    Thank you,   Josee Ortega, RN, BSN, [address]

## 2019-08-04 DIAGNOSIS — E55.9 VITAMIN D DEFICIENCY: Primary | ICD-10-CM

## 2019-08-04 RX ORDER — ERGOCALCIFEROL 1.25 MG/1
50000 CAPSULE ORAL
Qty: 4 CAP | Refills: 6 | Status: SHIPPED | OUTPATIENT
Start: 2019-08-04

## 2019-08-04 NOTE — PROGRESS NOTES
Please let her know that she does not have hepatitis C. Her vitamin D level is low at 22.7. Her calcium is low at 7.8. Her renal function labs are normal.  Her total cholesterol is 130. Her triglycerides are 155. Her LDL is 60. Her HDL is 39. I am prescribing prescription strength vitamin D for her to take.     Dr. Megan Washington  [facility], [address] [place] [address], [address] [place] [address] Str.  Phone: [phone]  Fax: [phone]

## 2019-08-05 ENCOUNTER — TELEPHONE (OUTPATIENT)
Dept: INTERNAL MEDICINE CLINIC | Age: 69
End: 2019-08-05

## 2019-08-05 NOTE — TELEPHONE ENCOUNTER
----- Message from Inna Levi MD sent at 8/4/2019  5:25 PM EDT -----  Please let her know that she does not have hepatitis C. Her vitamin D level is low at 22.7. Her calcium is low at 7.8. Her renal function labs are normal.  Her total cholesterol is 130. Her triglycerides are 155. Her LDL is 60. Her HDL is 39. I am prescribing prescription strength vitamin D for her to take.     Dr. Newton Record  [facility], [address] [place] [address], [address] [place] [address] Str.  Phone: [phone]  Fax: [phone]

## 2019-08-08 ENCOUNTER — OFFICE VISIT (OUTPATIENT)
Dept: SURGERY | Age: 69
End: 2019-08-08

## 2019-08-08 VITALS
DIASTOLIC BLOOD PRESSURE: 63 MMHG | HEART RATE: 85 BPM | HEIGHT: 61 IN | RESPIRATION RATE: 18 BRPM | TEMPERATURE: 98 F | SYSTOLIC BLOOD PRESSURE: 122 MMHG | BODY MASS INDEX: 20.58 KG/M2 | WEIGHT: 109 LBS

## 2019-08-08 DIAGNOSIS — K57.92 DIVERTICULITIS: Primary | ICD-10-CM

## 2019-08-08 NOTE — PROGRESS NOTES
Jd Dunlap presents today for   Chief Complaint   Patient presents with    Post OP Follow Up     7/16/19 robotic sigmoid colectomy for diverticulitis, some nausea, no daily bm's       Is someone accompanying this pt? no    Is the patient using any DME equipment during OV? no    Coordination of Care:  1. Have you been to the ER, urgent care clinic since your last visit? Hospitalized since your last visit? no    2. Have you seen or consulted any other health care providers outside of the [address] [place] [address] since your last visit? Include any pap smears or colon screening.  no

## 2019-08-08 NOTE — LETTER
8/8/19 Patient: Anne Salguero YOB: 1950 Date of Visit: 8/8/2019 Nissa Pina MD 
[address] [place] [address] [address] [facility] [address] [address] [address] [facility] [address] [id number] [address] [address] VIA In Basket Maxime Howell is seen in follow-up after robotic sigmoid colectomy for recurrent simple diverticulitis. Her procedure was special and that we were able to trans-rectally extract her sigmoid colon. This avoids the typical 4 cm laparoscopic extraction incision, and subsequently less pain, less risk of infection and less risk of hernia. She did extremely well after surgery and was discharged on postoperative day 3. In the office today she is well recovered. She is tolerating diet moving her bowels without any difficulty. Her exam is benign. She will follow-up in the office PRN. If you have questions, please do not hesitate to call me. I look forward to following your patient along with you. Sincerely, Abi Hernandez MD

## 2019-08-08 NOTE — PROGRESS NOTES
Subjective: Tolerating diet and moving her bowels. Past medical history and ROS were reviewed and unchanged. Abdomen: Soft, nontender nondistended  Wounds healed, no hernia    Assessment / Plan    Status post robotic sigmoid colectomy with transrectal extraction  Normalize diet, fiber supplement for 6 months  Follow-up PRN    A total of 15 minutes was spent with the patient, with >50% of time spent on counseling and coordination of care. The diagnoses and plan were discussed with patient. All questions answered. Plan of care agreed to by all concerned.

## 2019-08-16 ENCOUNTER — HOSPITAL ENCOUNTER (OUTPATIENT)
Dept: LAB | Age: 69
Discharge: HOME | End: 2019-08-16
Payer: MEDICARE

## 2019-08-16 ENCOUNTER — APPOINTMENT (OUTPATIENT)
Dept: INTERNAL MEDICINE CLINIC | Age: 69
End: 2019-08-16

## 2019-08-16 DIAGNOSIS — Z00.00 ROUTINE GENERAL MEDICAL EXAMINATION AT A HEALTH CARE FACILITY: ICD-10-CM

## 2019-08-16 LAB
ALBUMIN SERPL-MCNC: 3.4 G/DL (ref 3.4–5)
ALBUMIN/GLOB SERPL: 0.9 {RATIO} (ref 0.8–1.7)
ALP SERPL-CCNC: 97 U/L (ref 45–117)
ALT SERPL-CCNC: 20 U/L (ref 13–56)
ANION GAP SERPL CALC-SCNC: 10 MMOL/L (ref 3–18)
APPEARANCE UR: CLEAR
AST SERPL-CCNC: 29 U/L (ref 10–38)
BACTERIA URNS QL MICRO: ABNORMAL /HPF
BASOPHILS # BLD: 0.1 K/UL (ref 0–0.1)
BASOPHILS NFR BLD: 1 % (ref 0–2)
BILIRUB SERPL-MCNC: 0.4 MG/DL (ref 0.2–1)
BILIRUB UR QL: NEGATIVE
BUN SERPL-MCNC: 11 MG/DL (ref 7–18)
BUN/CREAT SERPL: 17 (ref 12–20)
CALCIUM SERPL-MCNC: 8.8 MG/DL (ref 8.5–10.1)
CAOX CRY URNS QL MICRO: ABNORMAL
CHLORIDE SERPL-SCNC: 106 MMOL/L (ref 100–111)
CO2 SERPL-SCNC: 24 MMOL/L (ref 21–32)
COLOR UR: ABNORMAL
CREAT SERPL-MCNC: 0.65 MG/DL (ref 0.6–1.3)
DIFFERENTIAL METHOD BLD: ABNORMAL
EOSINOPHIL # BLD: 0.1 K/UL (ref 0–0.4)
EOSINOPHIL NFR BLD: 2 % (ref 0–5)
EPITH CASTS URNS QL MICRO: ABNORMAL /LPF (ref 0–5)
ERYTHROCYTE [DISTWIDTH] IN BLOOD BY AUTOMATED COUNT: 13.7 % (ref 11.6–14.5)
GLOBULIN SER CALC-MCNC: 3.6 G/DL (ref 2–4)
GLUCOSE SERPL-MCNC: 88 MG/DL (ref 74–99)
GLUCOSE UR STRIP.AUTO-MCNC: NEGATIVE MG/DL
HCT VFR BLD AUTO: 35.4 % (ref 35–45)
HGB BLD-MCNC: 11.7 G/DL (ref 12–16)
HGB UR QL STRIP: ABNORMAL
KETONES UR QL STRIP.AUTO: NEGATIVE MG/DL
LEUKOCYTE ESTERASE UR QL STRIP.AUTO: ABNORMAL
LYMPHOCYTES # BLD: 1.3 K/UL (ref 0.9–3.6)
LYMPHOCYTES NFR BLD: 20 % (ref 21–52)
MCH RBC QN AUTO: 31.1 PG (ref 24–34)
MCHC RBC AUTO-ENTMCNC: 33.1 G/DL (ref 31–37)
MCV RBC AUTO: 94.1 FL (ref 74–97)
MONOCYTES # BLD: 0.6 K/UL (ref 0.05–1.2)
MONOCYTES NFR BLD: 9 % (ref 3–10)
NEUTS SEG # BLD: 4.3 K/UL (ref 1.8–8)
NEUTS SEG NFR BLD: 68 % (ref 40–73)
NITRITE UR QL STRIP.AUTO: NEGATIVE
PH UR STRIP: 5 [PH] (ref 5–8)
PLATELET # BLD AUTO: 254 K/UL (ref 135–420)
PMV BLD AUTO: 10.3 FL (ref 9.2–11.8)
POTASSIUM SERPL-SCNC: 3.7 MMOL/L (ref 3.5–5.5)
PROT SERPL-MCNC: 7 G/DL (ref 6.4–8.2)
PROT UR STRIP-MCNC: 30 MG/DL
RBC # BLD AUTO: 3.76 M/UL (ref 4.2–5.3)
RBC #/AREA URNS HPF: ABNORMAL /HPF (ref 0–5)
SODIUM SERPL-SCNC: 140 MMOL/L (ref 136–145)
SP GR UR REFRACTOMETRY: 1.02 (ref 1–1.03)
TSH SERPL DL<=0.05 MIU/L-ACNC: 1.24 UIU/ML (ref 0.36–3.74)
UROBILINOGEN UR QL STRIP.AUTO: 0.2 EU/DL (ref 0.2–1)
WBC # BLD AUTO: 6.3 K/UL (ref 4.6–13.2)
WBC URNS QL MICRO: ABNORMAL /HPF (ref 0–4)

## 2019-08-16 PROCEDURE — 85025 COMPLETE CBC W/AUTO DIFF WBC: CPT

## 2019-08-16 PROCEDURE — 80061 LIPID PANEL: CPT

## 2019-08-16 PROCEDURE — 81001 URINALYSIS AUTO W/SCOPE: CPT

## 2019-08-16 PROCEDURE — 83036 HEMOGLOBIN GLYCOSYLATED A1C: CPT

## 2019-08-16 PROCEDURE — 84443 ASSAY THYROID STIM HORMONE: CPT

## 2019-08-16 PROCEDURE — 80053 COMPREHEN METABOLIC PANEL: CPT

## 2019-08-16 PROCEDURE — 36415 COLL VENOUS BLD VENIPUNCTURE: CPT

## 2019-08-17 LAB
CHOLEST SERPL-MCNC: 252 MG/DL
EST. AVERAGE GLUCOSE BLD GHB EST-MCNC: 105 MG/DL
HBA1C MFR BLD: 5.3 % (ref 4.2–5.6)
HDLC SERPL-MCNC: 73 MG/DL (ref 40–60)
HDLC SERPL: 3.5 {RATIO} (ref 0–5)
LDLC SERPL CALC-MCNC: 154.8 MG/DL (ref 0–100)
LIPID PROFILE,FLP: ABNORMAL
TRIGL SERPL-MCNC: 121 MG/DL (ref ?–150)
VLDLC SERPL CALC-MCNC: 24.2 MG/DL

## 2019-08-20 ENCOUNTER — TELEPHONE (OUTPATIENT)
Dept: INTERNAL MEDICINE CLINIC | Age: 69
End: 2019-08-20

## 2019-08-20 NOTE — TELEPHONE ENCOUNTER
Pt calling asking to speak to manager. Says she has a complaint about employee. Info sent to [facility].

## 2019-08-21 ENCOUNTER — PATIENT OUTREACH (OUTPATIENT)
Dept: INTERNAL MEDICINE CLINIC | Age: 69
End: 2019-08-21

## 2019-08-21 NOTE — PROGRESS NOTES
Patient has graduated from the Transitions of Care Coordination  program on 8/21/19. Patient's symptoms are stable at this time. Patient/family has the ability to self-manage. Care management goals have been completed at this time. No further nurse navigator follow up scheduled. Goals Addressed                 This Visit's Progress     COMPLETED: Attends follow-up appointments as directed. 7/22: Patient attended transition of care appt as scheduled. 8/12: Attended follow up with GI as scheduled.  COMPLETED: Prevent complications post hospitalization. NN writer will monitor/assess patient 30-60 days post discharge. Pt has nurse navigator's contact information for any further questions, concerns, or needs.   Patients upcoming visits:    Future Appointments   Date Time Provider Manasa Meza   10/21/2019  8:30 AM Rey Aly MD [facility]

## 2019-08-25 NOTE — PROGRESS NOTES
Please let her know that her total cholesterol is elevated 252. Her triglycerides are 121. Her HDL is 73. Her LDL is 154.8. Her A1c is normal at 5.3. Additionally, she has normal kidney and liver function labs. Her thyroid function labs are unremarkable. She is mildly anemic with a hemoglobin of 11.7. The remainder of her CBC shows no significant abnormalities. We will recheck her CBC in a year.     Dr. Jose A Leger  [facility] [address], [address] [place] [address] Str.  Phone: [phone]  Fax: [phone]

## 2019-08-26 ENCOUNTER — TELEPHONE (OUTPATIENT)
Dept: INTERNAL MEDICINE CLINIC | Age: 69
End: 2019-08-26

## 2019-08-26 NOTE — TELEPHONE ENCOUNTER
----- Message from Sharla David MD sent at 8/25/2019  4:48 PM EDT -----  Please let her know that her total cholesterol is elevated 252. Her triglycerides are 121. Her HDL is 73. Her LDL is 154.8. Her A1c is normal at 5.3. Additionally, she has normal kidney and liver function labs. Her thyroid function labs are unremarkable. She is mildly anemic with a hemoglobin of 11.7. The remainder of her CBC shows no significant abnormalities. We will recheck her CBC in a year.     Dr. Minnie Fernando  [facility], [address] [place] [address], [facility] [address]  Phone: [phone]  Fax: [phone]

## 2019-08-28 DIAGNOSIS — K64.9 HEMORRHOIDS, UNSPECIFIED HEMORRHOID TYPE: ICD-10-CM

## 2019-08-28 RX ORDER — HYDROCORTISONE 25 MG/G
CREAM TOPICAL 4 TIMES DAILY
Qty: 30 G | Refills: 11 | Status: SHIPPED | OUTPATIENT
Start: 2019-08-28

## 2019-08-28 NOTE — TELEPHONE ENCOUNTER
Last Visit: 7/22/19 with MD Darren Rossi  Next Appointment: 10/21/19 with MD Darren Rossi  Previous Refill Encounter(s): 3/29/19 #30gr    Requested Prescriptions     Pending Prescriptions Disp Refills    hydrocortisone (ANUSOL-HC) 2.5 % rectal cream 30 g 0     Sig: Insert  into rectum four (4) times daily.

## 2019-09-11 ENCOUNTER — TELEPHONE (OUTPATIENT)
Dept: INTERNAL MEDICINE CLINIC | Age: 69
End: 2019-09-11

## 2019-09-11 NOTE — TELEPHONE ENCOUNTER
Chief Complaint   Patient presents with    Buttocks pain     per the patient her bottom is extremely raw     9-11-19 Patient reached and 2 identifiers were used: Full Name, and Date of Birth verified. The patient reached, and states she has been having frequent urination, with a burning sensation when she urinates, the entire bottom area is raw, and at times it goes away, then it will reappear with a Vengeance and be red, extremely raw feeling. The patient understands she will need to be seen for further evaluation, and will call back to schedule with our office. The patient understands that Dr Daljit Faustin is not here in the office at the moment, and that this message will be sent to her for review.

## 2019-09-11 NOTE — TELEPHONE ENCOUNTER
Patient stating every 3 or 4 days her bottom get extremely raw. She didn't want to make an appt. Wanted a nurse or Dr. Rosmery Ardon to advise as to what she should do.

## 2019-09-12 ENCOUNTER — OFFICE VISIT (OUTPATIENT)
Dept: INTERNAL MEDICINE CLINIC | Age: 69
End: 2019-09-12

## 2019-09-12 VITALS
HEIGHT: 61 IN | HEART RATE: 86 BPM | OXYGEN SATURATION: 98 % | TEMPERATURE: 98.3 F | BODY MASS INDEX: 20.81 KG/M2 | SYSTOLIC BLOOD PRESSURE: 121 MMHG | DIASTOLIC BLOOD PRESSURE: 72 MMHG | WEIGHT: 110.2 LBS | RESPIRATION RATE: 12 BRPM

## 2019-09-12 DIAGNOSIS — E78.2 MIXED HYPERLIPIDEMIA: ICD-10-CM

## 2019-09-12 DIAGNOSIS — N39.0 URINARY TRACT INFECTION WITHOUT HEMATURIA, SITE UNSPECIFIED: Primary | ICD-10-CM

## 2019-09-12 DIAGNOSIS — B36.9 FUNGAL SKIN INFECTION: ICD-10-CM

## 2019-09-12 LAB
BILIRUB UR QL STRIP: NEGATIVE
GLUCOSE UR-MCNC: NEGATIVE MG/DL
KETONES P FAST UR STRIP-MCNC: NEGATIVE MG/DL
PH UR STRIP: 5.5 [PH] (ref 4.6–8)
PROT UR QL STRIP: NEGATIVE
SP GR UR STRIP: 1.02 (ref 1–1.03)
UA UROBILINOGEN AMB POC: NORMAL (ref 0.2–1)
URINALYSIS CLARITY POC: CLEAR
URINALYSIS COLOR POC: YELLOW
URINE BLOOD POC: NORMAL
URINE LEUKOCYTES POC: NORMAL
URINE NITRITES POC: NEGATIVE

## 2019-09-12 RX ORDER — FLUCONAZOLE 150 MG/1
150 TABLET ORAL DAILY
Qty: 7 TAB | Refills: 0 | Status: SHIPPED | OUTPATIENT
Start: 2019-09-12 | End: 2019-09-27 | Stop reason: SDUPTHER

## 2019-09-12 RX ORDER — CEPHALEXIN 500 MG/1
500 CAPSULE ORAL 2 TIMES DAILY
Qty: 14 CAP | Refills: 0 | Status: SHIPPED | OUTPATIENT
Start: 2019-09-12 | End: 2019-09-27 | Stop reason: SDUPTHER

## 2019-09-12 RX ORDER — KETOCONAZOLE 20 MG/G
CREAM TOPICAL DAILY
Qty: 60 G | Refills: 11 | Status: SHIPPED | OUTPATIENT
Start: 2019-09-12 | End: 2020-09-25

## 2019-09-12 RX ORDER — ETHYL ALCOHOL 70 %
SOLUTION, NON-ORAL TOPICAL AS NEEDED
COMMUNITY

## 2019-09-12 NOTE — PROGRESS NOTES
Chief Complaint   Patient presents with    Dysuria     x 3-4 days   ROOM  3    Urinary Frequency     Patient states her vaginal/rectum area hurt with urination, there is a rawness to the Rectum area and the urine burns it. 1. Have you been to the ER, urgent care clinic since your last visit? Hospitalized since your last visit? No    2. Have you seen or consulted any other health care providers outside of the [address] [place] [address] since your last visit? Include any pap smears or colon screening. No    Patient was given a copy of the Advanced Directive and understands to bring it in once completed.   Health Maintenance Due   Topic Date Due    DTaP/Tdap/Td series (1 - Tdap) 12/04/1973    Shingrix Vaccine Age 50> (1 of 2) 12/04/2002    GLAUCOMA SCREENING Q2Y  12/04/2017    Bone Densitometry (Dexa) Screening  12/04/2017    BREAST CANCER SCRN MAMMOGRAM  05/12/2019

## 2019-09-12 NOTE — PROGRESS NOTES
[facility]:  9/12/2019, MRN: [id number]      Basil Dhaliwal is a 79 y.o. female and presents to clinic for Dysuria (x 3-4 days   ROOM  3) and Urinary Frequency    Subjective: The patient is a 28-year-old female with history of allergic rhinitis, hemorrhoids, cataracts, HLD, diverticulitis, and depression/anxiety (rarely has anxiety with flying).     1. UTI: Present: 3-4 days. Sx include: frequency. Alleviating factors: none. Aggravating factors: urination. Abdominal pain: none. Fever/chills: none. Hematuria: none. Vaginal bleeding: none. Hematochezia/melena: none. She has irritation to her perineum once urine touches her perineum. She still has some rectal incontinence since her diverticulitis surgery. 2. HLD: Her most recent labs: Her total cholesterol is elevated 252. Her triglycerides are 121. Her HDL is 73. Her LDL is 154.8. Not on rx. Patient Active Problem List    Diagnosis Date Noted    Allergic rhinitis 07/21/2019    Hypokalemia 07/21/2019    Anemia 07/21/2019    Diverticulitis 07/16/2019    Mixed hyperlipidemia 04/28/2017    Depression 09/02/2014       Current Outpatient Medications   Medication Sig Dispense Refill    vitamin a & d (A&D) ointment Apply  to affected area as needed for Skin Irritation or Itching.  zinc oxide (DESITIN) 13 % topical cream Apply  to affected area as needed for Skin Irritation or Itching.  Lactobacillus acidophilus (ACIDOPHILUS) cap Take 2 Caps by mouth two (2) times a day.  hydrocortisone (ANUSOL-HC) 2.5 % rectal cream Insert  into rectum four (4) times daily. 30 g 11    ergocalciferol (ERGOCALCIFEROL) 50,000 unit capsule Take 1 Cap by mouth every seven (7) days. 4 Cap 6    ondansetron (ZOFRAN ODT) 4 mg disintegrating tablet Take 1 Tab by mouth every eight (8) hours as needed for Nausea. 14 Tab 0    ALPRAZolam (XANAX) 0.25 mg tablet Take 1 Tab by mouth daily as needed for Anxiety.  5 Tab 0    psyllium (METAMUCIL) packet Take 1 Packet by mouth daily as needed. Allergies   Allergen Reactions    Septra [Sulfamethoprim Ds] Rash    Sulfamethoxazole-Trimethoprim Rash       Past Medical History:   Diagnosis Date    Depression     Diverticulitis        Past Surgical History:   Procedure Laterality Date    FLEXIBLE SIGMOIDOSCOPY N/A 7/16/2019    SIGMOIDOSCOPY FLEXIBLE (No endo tech needed) performed by Moon Avalos MD at [facility]    HX COLONOSCOPY  2007; 2/2019    neg; 2nd colo showed diverticula    HX OTHER SURGICAL      Right arm    LAP,SURG,COLECTOMY,W/ANAST N/A 07/16/2019    Dr. Ankit Benjamin       Family History   Problem Relation Age of Onset    Hypertension Father     Heart Disease Father     No Known Problems Mother     Heart Attack Brother         age 48    Heart Attack Paternal Grandfather         age 72    Heart Attack Brother         age 62       Social History     Tobacco Use    Smoking status: Never Smoker    Smokeless tobacco: Never Used   Substance Use Topics    Alcohol use: Yes     Comment: occassionally       ROS   Review of Systems   Constitutional: Negative for chills and fever. HENT: Negative for ear pain and sore throat. Eyes: Negative for blurred vision and pain. Respiratory: Negative for cough and shortness of breath. Cardiovascular: Negative for chest pain. Gastrointestinal: Negative for abdominal pain. Genitourinary: Positive for frequency. Negative for dysuria and hematuria. Musculoskeletal: Negative for joint pain and myalgias. Skin: Positive for rash. Neurological: Negative for tingling, focal weakness and headaches. Endo/Heme/Allergies: Does not bruise/bleed easily. Psychiatric/Behavioral: Negative for substance abuse.        Objective     Vitals:    09/12/19 1205   BP: 121/72   Pulse: 86   Resp: 12   Temp: 98.3 °F (36.8 °C)   TempSrc: Oral   SpO2: 98%   Weight: 110 lb 3.2 oz (50 kg)   Height: 5' 1\" (1.549 m)   PainSc:   7   PainLoc: Rectum       Physical Exam Constitutional: She is oriented to person, place, and time and well-developed, well-nourished, and in no distress. HENT:   Head: Normocephalic and atraumatic. Right Ear: External ear normal.   Left Ear: External ear normal.   Nose: Nose normal.   Mouth/Throat: Oropharynx is clear and moist. No oropharyngeal exudate. Eyes: Conjunctivae and EOM are normal. Right eye exhibits no discharge. Left eye exhibits no discharge. No scleral icterus. Neck: Neck supple. Cardiovascular: Normal rate, regular rhythm, normal heart sounds and intact distal pulses. Exam reveals no gallop and no friction rub. No murmur heard. Pulmonary/Chest: Effort normal and breath sounds normal. No respiratory distress. She has no wheezes. She has no rales. Abdominal: Soft. Bowel sounds are normal. She exhibits no distension. There is no tenderness. There is no rebound and no guarding. Musculoskeletal: She exhibits no edema or tenderness (Bue). Lymphadenopathy:     She has no cervical adenopathy. Neurological: She is alert and oriented to person, place, and time. She exhibits normal muscle tone. Gait normal.   Skin: Skin is warm and dry. She has erythema along her perianal area, tender to palpation the left lesions. Psychiatric: Affect normal.   Nursing note and vitals reviewed.       LABS   Data Review:   Lab Results   Component Value Date/Time    WBC 6.3 08/16/2019 09:33 AM    HGB 11.7 (L) 08/16/2019 09:33 AM    HCT 35.4 08/16/2019 09:33 AM    PLATELET 064 93/48/3236 09:33 AM    MCV 94.1 08/16/2019 09:33 AM     Results for orders placed or performed in visit on 09/12/19   AMB POC URINALYSIS DIP STICK AUTO W/O MICRO     Status: None   Result Value Ref Range Status    Color (UA POC) Yellow  Final    Clarity (UA POC) Clear  Final    Glucose (UA POC) Negative Negative Final    Bilirubin (UA POC) Negative Negative Final    Ketones (UA POC) Negative Negative Final    Specific gravity (UA POC) 1.020 1.001 - 1.035 Final    Blood (UA POC) 2+ Negative Final    pH (UA POC) 5.5 4.6 - 8.0 Final    Protein (UA POC) Negative Negative Final    Urobilinogen (UA POC) 0.2 mg/dL 0.2 - 1 Final    Nitrites (UA POC) Negative Negative Final    Leukocyte esterase (UA POC) 1+ Negative Final     Lab Results   Component Value Date/Time    Sodium 140 08/16/2019 09:33 AM    Potassium 3.7 08/16/2019 09:33 AM    Chloride 106 08/16/2019 09:33 AM    CO2 24 08/16/2019 09:33 AM    Anion gap 10 08/16/2019 09:33 AM    Glucose 88 08/16/2019 09:33 AM    BUN 11 08/16/2019 09:33 AM    Creatinine 0.65 08/16/2019 09:33 AM    BUN/Creatinine ratio 17 08/16/2019 09:33 AM    GFR est AA >60 08/16/2019 09:33 AM    GFR est non-AA >60 08/16/2019 09:33 AM    Calcium 8.8 08/16/2019 09:33 AM       Lab Results   Component Value Date/Time    Cholesterol, total 252 (H) 08/16/2019 09:33 AM    HDL Cholesterol 73 (H) 08/16/2019 09:33 AM    LDL, calculated 154.8 (H) 08/16/2019 09:33 AM    VLDL, calculated 24.2 08/16/2019 09:33 AM    Triglyceride 121 08/16/2019 09:33 AM    CHOL/HDL Ratio 3.5 08/16/2019 09:33 AM       Lab Results   Component Value Date/Time    Hemoglobin A1c 5.3 08/16/2019 09:33 AM       Assessment/Plan:   1. Acute Cystitis:   -A UA was ordered today. Findings are consistent with a UTI. -We will culture her urine as well. - Ordering a course of Keflex. I instructed her to take probiotics while on antibiotic. She is notify me if she develops any diarrhea. ORDERS:  - AMB POC URINALYSIS DIP STICK AUTO W/O MICRO  - cephALEXin (KEFLEX) 500 mg capsule; Take 1 Cap by mouth two (2) times a day. Dispense: 14 Cap; Refill: 0    2. Fungal skin infection: Along her perineum.  -Ordering ketoconazole cream to apply to the affected area. -Ordering a 7-day course of Diflucan given the severity of her physical exam findings. ORDERS:  - fluconazole (DIFLUCAN) 150 mg tablet; Take 1 Tab by mouth daily for 7 days. FDA advises cautious prescribing of oral fluconazole in pregnancy. Dispense: 7 Tab; Refill: 0  - ketoconazole (NIZORAL) 2 % topical cream; Apply  to affected area daily. Dispense: 60 g; Refill: 11    3. Mixed hyperlipidemia: Stable. -The patient declines pharmacotherapy.  -We discussed the importance of maintaining a heart healthy diet.  -We will check a lipid panel just before her follow-up appointment. ORDERS:  - LIPID PANEL; Future      Health Maintenance Due   Topic Date Due    DTaP/Tdap/Td series (1 - Tdap) 12/04/1973    Shingrix Vaccine Age 50> (1 of 2) 12/04/2002    GLAUCOMA SCREENING Q2Y  12/04/2017    Bone Densitometry (Dexa) Screening  12/04/2017    BREAST CANCER SCRN MAMMOGRAM  05/12/2019     Lab review: labs are reviewed in the EHR and ordered as mentioned above    I have discussed the diagnosis with the patient and the intended plan as seen in the above orders. The patient has received an after-visit summary and questions were answered concerning future plans. I have discussed medication side effects and warnings with the patient as well. I have reviewed the plan of care with the patient, accepted their input and they are in agreement with the treatment goals. All questions were answered. The patient understands the plan of care. Handouts provided today with above information. Pt instructed if symptoms worsen to call the office or report to the ED for continued care. Greater than 50% of the visit time was spent in counseling and/or coordination of care. Voice recognition was used to generate this report, which may have resulted in some phonetic based errors in grammar and contents. Even though attempts were made to correct all the mistakes, some may have been missed, and remained in the body of the document.           Breanna Cueva MD

## 2019-09-12 NOTE — PATIENT INSTRUCTIONS
Health Maintenance Due   Topic Date Due    DTaP/Tdap/Td series (1 - Tdap) 12/04/1973    Shingrix Vaccine Age 50> (1 of 2) 12/04/2002    GLAUCOMA SCREENING Q2Y  12/04/2017    Bone Densitometry (Dexa) Screening  12/04/2017    BREAST CANCER SCRN MAMMOGRAM  05/12/2019          Painful Urination (Dysuria): Care Instructions  Your Care Instructions  Burning pain with urination (dysuria) is a common symptom of a urinary tract infection or other urinary problems. The bladder may become inflamed. This can cause pain when the bladder fills and empties. You may also feel pain if the tube that carries urine from the bladder to the outside of the body (urethra) gets irritated or infected. Sexually transmitted infections (STIs) also may cause pain when you urinate. Sometimes the pain can be caused by things other than an infection. The urethra can be irritated by soaps, perfumes, or foreign objects in the urethra. Kidney stones can cause pain when they pass through the urethra. The cause may be hard to find. You may need tests. Treatment for painful urination depends on the cause. Follow-up care is a key part of your treatment and safety. Be sure to make and go to all appointments, and call your doctor if you are having problems. It's also a good idea to know your test results and keep a list of the medicines you take. How can you care for yourself at home? · Drink extra water for the next day or two. This will help make the urine less concentrated. (If you have kidney, heart, or liver disease and have to limit fluids, talk with your doctor before you increase the amount of fluids you drink.)  · Avoid drinks that are carbonated or have caffeine. They can irritate the bladder. · Urinate often. Try to empty your bladder each time. For women:  · Urinate right after you have sex. · After going to the bathroom, wipe from front to back. · Avoid douches, bubble baths, and feminine hygiene sprays.  And avoid other feminine hygiene products that have deodorants. When should you call for help? Call your doctor now or seek immediate medical care if:    · You have new symptoms, such as fever, nausea, or vomiting.     · You have new or worse symptoms of a urinary problem. For example:  ? You have blood or pus in your urine. ? You have chills or body aches. ? It hurts worse to urinate. ? You have groin or belly pain. ? You have pain in your back just below your rib cage (the flank area).    Watch closely for changes in your health, and be sure to contact your doctor if you have any problems. Where can you learn more? Go to http://ronald-hung.info/. Enter C576 in the search box to learn more about \"Painful Urination (Dysuria): Care Instructions. \"  Current as of: December 19, 2018  Content Version: 12.1  © 7030-8012 [employer]. Care instructions adapted under license by [employer] (which disclaims liability or warranty for this information). If you have questions about a medical condition or this instruction, always ask your healthcare professional. [place] [address] any warranty or liability for your use of this information.

## 2019-09-27 ENCOUNTER — TELEPHONE (OUTPATIENT)
Dept: INTERNAL MEDICINE CLINIC | Age: 69
End: 2019-09-27

## 2019-09-27 DIAGNOSIS — N39.0 URINARY TRACT INFECTION WITHOUT HEMATURIA, SITE UNSPECIFIED: ICD-10-CM

## 2019-09-27 DIAGNOSIS — B36.9 FUNGAL SKIN INFECTION: ICD-10-CM

## 2019-09-27 RX ORDER — FLUCONAZOLE 150 MG/1
150 TABLET ORAL DAILY
Qty: 7 TAB | Refills: 0 | Status: SHIPPED | OUTPATIENT
Start: 2019-09-27 | End: 2019-10-04

## 2019-09-27 RX ORDER — CEPHALEXIN 500 MG/1
500 CAPSULE ORAL 2 TIMES DAILY
Qty: 14 CAP | Refills: 0 | Status: SHIPPED | OUTPATIENT
Start: 2019-09-27 | End: 2019-10-08 | Stop reason: ALTCHOICE

## 2019-09-27 NOTE — TELEPHONE ENCOUNTER
Please let her know that I am extending both the fluconazole and keflex. Please have her scheduled to see me in 2 weeks. 15 min apt is ok. If no slot is available, ok to add 7:45am slot for her to my schedule sometime.     Dr. Frandy Wang  [facility], [address] [place] [address], [address] [place] [address] Str.  Phone: [phone]  Fax: [phone]

## 2019-09-27 NOTE — TELEPHONE ENCOUNTER
Returned call to patient and she states that the skin infection had improved while on the antibiotics but has returned. Patient states that the cream causes burning.

## 2019-10-08 ENCOUNTER — OFFICE VISIT (OUTPATIENT)
Dept: INTERNAL MEDICINE CLINIC | Age: 69
End: 2019-10-08

## 2019-10-08 VITALS
OXYGEN SATURATION: 99 % | HEIGHT: 61 IN | RESPIRATION RATE: 16 BRPM | HEART RATE: 83 BPM | WEIGHT: 114 LBS | TEMPERATURE: 96.4 F | BODY MASS INDEX: 21.52 KG/M2 | SYSTOLIC BLOOD PRESSURE: 114 MMHG | DIASTOLIC BLOOD PRESSURE: 63 MMHG

## 2019-10-08 DIAGNOSIS — Z23 ENCOUNTER FOR IMMUNIZATION: ICD-10-CM

## 2019-10-08 DIAGNOSIS — N39.0 URINARY TRACT INFECTION WITHOUT HEMATURIA, SITE UNSPECIFIED: ICD-10-CM

## 2019-10-08 DIAGNOSIS — R15.9 INCONTINENCE OF FECES, UNSPECIFIED FECAL INCONTINENCE TYPE: ICD-10-CM

## 2019-10-08 DIAGNOSIS — R21 RASH: Primary | ICD-10-CM

## 2019-10-08 RX ORDER — FLUCONAZOLE 150 MG/1
150 TABLET ORAL DAILY
Qty: 14 TAB | Refills: 0 | Status: SHIPPED | OUTPATIENT
Start: 2019-10-08 | End: 2019-10-22

## 2019-10-08 NOTE — PATIENT INSTRUCTIONS
Fluconazole (By mouth)   Fluconazole ([id number])  Prevents and treats fungal infections. Brand Name(s): Diflucan   There may be other brand names for this medicine. When This Medicine Should Not Be Used: This medicine is not right for everyone. Do not use it if you had an allergic reaction to fluconazole, or if you are pregnant. How to Use This Medicine:   Liquid, Tablet  · Your doctor will tell you how much medicine to use. Do not use more than directed. · Oral liquid: Shake well just before each use. Measure the oral liquid medicine with a marked measuring spoon, oral syringe, or medicine cup. · Take all of the medicine in your prescription to clear up your infection, even if you feel better after the first few doses. · Read and follow the patient instructions that come with this medicine. Talk to your doctor or pharmacist if you have any questions. · Missed dose: Take a dose as soon as you remember. If it is almost time for your next dose, wait until then and take a regular dose. Do not take extra medicine to make up for a missed dose. · Store the medicine in a closed container at room temperature, away from heat, moisture, and direct light. Store the oral liquid in the refrigerator or at room temperature and use it within 14 days. Do not freeze. Drugs and Foods to Avoid:   Ask your doctor or pharmacist before using any other medicine, including over-the-counter medicines, vitamins, and herbal products. · Do not use this medicine together with astemizole, cisapride, erythromycin, pimozide, quinidine, or terfenadine. · Some foods and medicines can affect how fluconazole works. Tell your doctor if you are using cimetidine, midazolam, prednisone, rifabutin, rifampin, theophylline, tofacitinib, triazolam, vitamin A supplements, or voriconazole.  Also tell your doctor if you are using any of the following:   ¨ A blood thinner (such as warfarin)  ¨ A diuretic or \"water pill\" (such as hydrochlorothiazide), or blood pressure medicine (such as amlodipine, felodipine, isradipine, losartan, nifedipine)  ¨ Birth control pills  ¨ Cancer medicine (cyclophosphamide, vinblastine, vincristine)  ¨ Diabetes medicine that you take by mouth (glipizide, glyburide, tolbutamide)  ¨ Medicine to lower cholesterol (atorvastatin, fluvastatin, simvastatin)  ¨ Medicine to treat depression (amitriptyline, nortriptyline)  ¨ Medicine to treat HIV/AIDS (saquinavir, zidovudine)  ¨ Medicine to treat malaria (halofantrine)  ¨ Medicine to treat seizures (carbamazepine, phenytoin)  ¨ Medicine that weakens the immune system (cyclosporine, sirolimus, tacrolimus)  ¨ Narcotic pain medicine (alfentanil, fentanyl, methadone)  ¨ Pain or arthritis medicine (aspirin, celecoxib, diclofenac, ibuprofen, naproxen)  Warnings While Using This Medicine:   · It is not safe to take this medicine during pregnancy. It could harm an unborn baby. Tell your doctor right away if you become pregnant. · Tell your doctor if you are breastfeeding, or if you have kidney disease, liver disease, heart disease, heart rhythm problems, cancer, or HIV/AIDS. · This medicine may cause the following problems:   ¨ Liver problems  ¨ Serious skin reactions  ¨ Changes in heart rhythm, such as a condition called QT prolongation  · This medicine may make you dizzy or drowsy. Do not drive or do anything that could be dangerous until you know how this medicine affects you. · Call your doctor if your symptoms do not improve or if they get worse. · Keep all medicine out of the reach of children. Never share your medicine with anyone.   Possible Side Effects While Using This Medicine:   Call your doctor right away if you notice any of these side effects:  · Allergic reaction: Itching or hives, swelling in your face or hands, swelling or tingling in your mouth or throat, chest tightness, trouble breathing  · Blistering, peeling, or red skin rash  · Dark urine or pale stools, nausea, vomiting, loss of appetite, stomach pain, yellow skin or eyes  · Fast, pounding, or uneven heartbeat  · Unusual bleeding, bruising, or weakness  If you notice these less serious side effects, talk with your doctor:   · Headache  · Mild nausea, vomiting, stomach pain, or diarrhea  If you notice other side effects that you think are caused by this medicine, tell your doctor. Call your doctor for medical advice about side effects. You may report side effects to FDA at 4-616-JXN-7923  © 2017 [facility] Information is for End User's use only and may not be sold, redistributed or otherwise used for commercial purposes. The above information is an  only. It is not intended as medical advice for individual conditions or treatments. Talk to your doctor, nurse or pharmacist before following any medical regimen to see if it is safe and effective for you. Vaccine Information Statement    Influenza (Flu) Vaccine (Inactivated or Recombinant): What You Need to Know    Many Vaccine Information Statements are available in [place] and other languages. See www.immunize.org/vis  Hojas de información sobre vacunas están disponibles en español y en muchos otros idiomas. Visite www.immunize.org/vis    1. Why get vaccinated? Influenza vaccine can prevent influenza (flu). Flu is a contagious disease that spreads around the [place] every year, usually between October and May. Anyone can get the flu, but it is more dangerous for some people. Infants and young children, people 72years of age and older, pregnant women, and people with certain health conditions or a weakened immune system are at greatest risk of flu complications. Pneumonia, bronchitis, sinus infections and ear infections are examples of flu-related complications. If you have a medical condition, such as heart disease, cancer or diabetes, flu can make it worse.     Flu can cause fever and chills, sore throat, muscle aches, fatigue, cough, headache, and runny or stuffy nose. Some people may have vomiting and diarrhea, though this is more common in children than adults. Each year thousands of people in the [facility] die from flu, and many more are hospitalized. Flu vaccine prevents millions of illnesses and flu-related visits to the doctor each year. 2. Influenza vaccines     CDC recommends everyone 10months of age and older get vaccinated every flu season. Children 6 months through 6years of age may need 2 doses during a single flu season. Everyone else needs only 1 dose each flu season. It takes about 2 weeks for protection to develop after vaccination. There are many flu viruses, and they are always changing. Each year a new flu vaccine is made to protect against three or four viruses that are likely to cause disease in the upcoming flu season. Even when the vaccine doesnt exactly match these viruses, it may still provide some protection. Influenza vaccine does not cause flu. Influenza vaccine may be given at the same time as other vaccines. 3. Talk with your health care provider    Tell your vaccine provider if the person getting the vaccine:   Has had an allergic reaction after a previous dose of influenza vaccine, or has any severe, life-threatening allergies.  Has ever had Guillain-Barré Syndrome (also called GBS). In some cases, your health care provider may decide to postpone influenza vaccination to a future visit. People with minor illnesses, such as a cold, may be vaccinated. People who are moderately or severely ill should usually wait until they recover before getting influenza vaccine. Your health care provider can give you more information. 4. Risks of a reaction     Soreness, redness, and swelling where shot is given, fever, muscle aches, and headache can happen after influenza vaccine.    There may be a very small increased risk of Guillain-Barré Syndrome (GBS) after inactivated influenza vaccine (the flu shot). Shakira Lara children who get the flu shot along with pneumococcal vaccine (PCV13), and/or DTaP vaccine at the same time might be slightly more likely to have a seizure caused by fever. Tell your health care provider if a child who is getting flu vaccine has ever had a seizure. People sometimes faint after medical procedures, including vaccination. Tell your provider if you feel dizzy or have vision changes or ringing in the ears. As with any medicine, there is a very remote chance of a vaccine causing a severe allergic reaction, other serious injury, or death. 5. What if there is a serious problem? An allergic reaction could occur after the vaccinated person leaves the clinic. If you see signs of a severe allergic reaction (hives, swelling of the face and throat, difficulty breathing, a fast heartbeat, dizziness, or weakness), call 9-1-1 and get the person to the nearest hospital.    For other signs that concern you, call your health care provider. Adverse reactions should be reported to the Vaccine Adverse Event Reporting System (VAERS). Your health care provider will usually file this report, or you can do it yourself. Visit the VAERS website at www.vaers. hhs.gov or call 4-302.501.4895. VAERS is only for reporting reactions, and VAERS staff do not give medical advice. 6. The National Vaccine Injury Compensation Program    The [facility] Vaccine Injury Compensation Program (VICP) is a federal program that was created to compensate people who may have been injured by certain vaccines. Visit the VICP website at www.hrsa.gov/vaccinecompensation or call 2-197.904.7560 to learn about the program and about filing a claim. There is a time limit to file a claim for compensation. 7. How can I learn more?  Ask your health care provider.  Call your local or state health department.    Contact the Centers for Disease Control and Prevention (CDC):  - Call 6-983.840.7715 (1-800-CDC-INFO) or  - Visit CDCs influenza website at www.cdc.gov/flu    Vaccine Information Statement (Interim)  Inactivated Influenza Vaccine   8/15/2019  42 ROSENDO Edwards [id number]   Department of Health and Human Services  Centers for Disease Control and Prevention    Office Use Only

## 2019-10-08 NOTE — PROGRESS NOTES
INTERNISTS OF [facility]:  10/8/2019, MRN: [id number]      Latricia Rodriguez is a 79 y.o. female and presents to clinic for Follow-up and Rash (Patient reports the rash improved but then returned. Patient states the same thing happened after the second round abx. )    Subjective: The patient is a 71-year-old female with history of allergic rhinitis, hemorrhoids, cataracts, HLD, diverticulitis, and depression/anxiety (rarely has anxiety with flying). 1.  UTI: At her last appointment, she was diagnosed with a UTI and given a course of Keflex. Since then, her urinary sx resolved. 2. Rash: She reported at her last appointment, a rash along her perineum. No relieving factors were noted at this time. She has associated burning pain along the affected area. She was given a 7-day course of fluconazole and ketoconazole cream for presumed fungal skin infection. Since then, her rash improved but \"came back upon completing the fluconazole. She has been applying topically Vaseline to the affected area. The patient has fecal incontinence since her diverticulitis related surgery earlier this year. Symptoms not improving. She has to wear incontinence pads as a result. Patient Active Problem List    Diagnosis Date Noted    Allergic rhinitis 07/21/2019    Hypokalemia 07/21/2019    Anemia 07/21/2019    Diverticulitis 07/16/2019    Mixed hyperlipidemia 04/28/2017    Depression 09/02/2014       Current Outpatient Medications   Medication Sig Dispense Refill    vitamin a & d (A&D) ointment Apply  to affected area as needed for Skin Irritation or Itching.  zinc oxide (DESITIN) 13 % topical cream Apply  to affected area as needed for Skin Irritation or Itching.  Lactobacillus acidophilus (ACIDOPHILUS) cap Take 2 Caps by mouth two (2) times a day.  psyllium (METAMUCIL) packet Take 1 Packet by mouth daily as needed.  ketoconazole (NIZORAL) 2 % topical cream Apply  to affected area daily.  60 g 11  hydrocortisone (ANUSOL-HC) 2.5 % rectal cream Insert  into rectum four (4) times daily. 30 g 11    ergocalciferol (ERGOCALCIFEROL) 50,000 unit capsule Take 1 Cap by mouth every seven (7) days. 4 Cap 6    ondansetron (ZOFRAN ODT) 4 mg disintegrating tablet Take 1 Tab by mouth every eight (8) hours as needed for Nausea. 14 Tab 0    ALPRAZolam (XANAX) 0.25 mg tablet Take 1 Tab by mouth daily as needed for Anxiety. 5 Tab 0    cephALEXin (KEFLEX) 500 mg capsule Take 1 Cap by mouth two (2) times a day. 14 Cap 0       Allergies   Allergen Reactions    Septra [Sulfamethoprim Ds] Rash    Sulfamethoxazole-Trimethoprim Rash       Past Medical History:   Diagnosis Date    Depression     Diverticulitis        Past Surgical History:   Procedure Laterality Date    FLEXIBLE SIGMOIDOSCOPY N/A 7/16/2019    SIGMOIDOSCOPY FLEXIBLE (No endo tech needed) performed by Bogdan Underwood MD at [address] [place] [address] HX COLONOSCOPY  2007; 2/2019    neg; 2nd colo showed diverticula    HX OTHER SURGICAL      Right arm    LAP,SURG,COLECTOMY,W/ANAST N/A 07/16/2019    Dr. Dodie Hines       Family History   Problem Relation Age of Onset    Hypertension Father     Heart Disease Father     No Known Problems Mother     Heart Attack Brother         age 48    Heart Attack Paternal Grandfather         age 72    Heart Attack Brother         age 62       Social History     Tobacco Use    Smoking status: Never Smoker    Smokeless tobacco: Never Used   Substance Use Topics    Alcohol use: Yes     Comment: occassionally       ROS   Review of Systems   Constitutional: Negative for chills and fever. HENT: Negative for ear pain and sore throat. Eyes: Negative for blurred vision and pain. Respiratory: Negative for cough and shortness of breath. Cardiovascular: Negative for chest pain. Gastrointestinal: Negative for abdominal pain, blood in stool and melena. Genitourinary: Negative for dysuria and hematuria.    Musculoskeletal: Negative for joint pain and myalgias. Skin: Positive for rash. Neurological: Negative for tingling, focal weakness and headaches. Endo/Heme/Allergies: Does not bruise/bleed easily. Psychiatric/Behavioral: Negative for substance abuse. Objective     Vitals:    10/08/19 0946   BP: 114/63   Pulse: 83   Resp: 16   Temp: 96.4 °F (35.8 °C)   TempSrc: Oral   SpO2: 99%   Weight: 114 lb (51.7 kg)   Height: 5' 1\" (1.549 m)   PainSc:   0 - No pain       Physical Exam   Constitutional: She is oriented to person, place, and time and well-developed, well-nourished, and in no distress. HENT:   Head: Normocephalic and atraumatic. Eyes: Conjunctivae and EOM are normal. Right eye exhibits no discharge. Left eye exhibits no discharge. No scleral icterus. Cardiovascular: Normal rate. Pulmonary/Chest: Effort normal. No respiratory distress. Abdominal: She exhibits no distension. Musculoskeletal: She exhibits no edema. Neurological: She is alert and oriented to person, place, and time. She exhibits normal muscle tone. Gait normal.   Skin: Skin is warm and dry. There is less erythema along her perineum. There is some stool present along her rectum externally. Psychiatric: Affect normal.   Nursing note and vitals reviewed.       LABS   Data Review:   Lab Results   Component Value Date/Time    WBC 6.3 08/16/2019 09:33 AM    HGB 11.7 (L) 08/16/2019 09:33 AM    HCT 35.4 08/16/2019 09:33 AM    PLATELET 946 02/19/7265 09:33 AM    MCV 94.1 08/16/2019 09:33 AM       Lab Results   Component Value Date/Time    Sodium 140 08/16/2019 09:33 AM    Potassium 3.7 08/16/2019 09:33 AM    Chloride 106 08/16/2019 09:33 AM    CO2 24 08/16/2019 09:33 AM    Anion gap 10 08/16/2019 09:33 AM    Glucose 88 08/16/2019 09:33 AM    BUN 11 08/16/2019 09:33 AM    Creatinine 0.65 08/16/2019 09:33 AM    BUN/Creatinine ratio 17 08/16/2019 09:33 AM    GFR est AA >60 08/16/2019 09:33 AM    GFR est non-AA >60 08/16/2019 09:33 AM    Calcium 8.8 08/16/2019 09:33 AM       Lab Results   Component Value Date/Time    Cholesterol, total 252 (H) 08/16/2019 09:33 AM    HDL Cholesterol 73 (H) 08/16/2019 09:33 AM    LDL, calculated 154.8 (H) 08/16/2019 09:33 AM    VLDL, calculated 24.2 08/16/2019 09:33 AM    Triglyceride 121 08/16/2019 09:33 AM    CHOL/HDL Ratio 3.5 08/16/2019 09:33 AM       Lab Results   Component Value Date/Time    Hemoglobin A1c 5.3 08/16/2019 09:33 AM       Assessment/Plan:   1. Rash: Responded well to antifungal rx but sx reoccurred after stopping fluconazole. - Prescribing a longer course of fluconazole  - C/w ketoconazole cream  - Referral to Dermatology    2. Urinary tract infection without hematuria: Resolved s/p keflex. Observation. 3.  Stool Incontinence: Status post diverticulitis surgery. - We will have the patient follow-up with GI for evaluation of her external sphincter. This was discussed with her today. Health Maintenance Due   Topic Date Due    Shingrix Vaccine Age 49> (1 of 2) 12/04/2002    GLAUCOMA SCREENING Q2Y  12/04/2017    Bone Densitometry (Dexa) Screening  12/04/2017    BREAST CANCER SCRN MAMMOGRAM  05/12/2019    Influenza Age 5 to Adult  08/01/2019     Lab review: labs are reviewed in the EHR    I have discussed the diagnosis with the patient and the intended plan as seen in the above orders. The patient has received an after-visit summary and questions were answered concerning future plans. I have discussed medication side effects and warnings with the patient as well. I have reviewed the plan of care with the patient, accepted their input and they are in agreement with the treatment goals. All questions were answered. The patient understands the plan of care. Handouts provided today with above information. Pt instructed if symptoms worsen to call the office or report to the ED for continued care. Greater than 50% of the visit time was spent in counseling and/or coordination of care.       Voice recognition was used to generate this report, which may have resulted in some phonetic based errors in grammar and contents. Even though attempts were made to correct all the mistakes, some may have been missed, and remained in the body of the document.           Lum Patient, MD

## 2019-10-08 NOTE — PROGRESS NOTES
Gillian Joshua presents today for   Chief Complaint   Patient presents with    Follow-up    Rash     Patient reports the rash improved but then returned. Patient states the same thing happened after the second round abx. Depression Screening:  3 most recent PHQ Screens 7/22/2019   Little interest or pleasure in doing things Not at all   Feeling down, depressed, irritable, or hopeless Not at all   Total Score PHQ 2 0       Learning Assessment:  Learning Assessment 7/22/2019   PRIMARY LEARNER Patient   HIGHEST LEVEL OF EDUCATION - PRIMARY LEARNER  4 YEARS OF COLLEGE   BARRIERS PRIMARY LEARNER NONE   CO-LEARNER CAREGIVER No   PRIMARY LANGUAGE ENGLISH    NEED -   LEARNER PREFERENCE PRIMARY DEMONSTRATION   LEARNING SPECIAL TOPICS -   ANSWERED BY patient   RELATIONSHIP SELF       Abuse Screening:  Abuse Screening Questionnaire 7/22/2019   Do you ever feel afraid of your partner? N   Are you in a relationship with someone who physically or mentally threatens you? N   Is it safe for you to go home? Y       Fall Risk  Fall Risk Assessment, last 12 mths 7/22/2019   Able to walk? Yes   Fall in past 12 months? Yes   Fall with injury? Yes   Number of falls in past 12 months 1   Fall Risk Score 2           Coordination of Care:  1. Have you been to the ER, urgent care clinic since your last visit? Hospitalized since your last visit? no    2. Have you seen or consulted any other health care providers outside of the [address] [place] [address] since your last visit? Include any pap smears or colon screening. no      Advance Directive:  1. Do you have an advance directive in place? Patient Reply:no    2. If not, would you like material regarding how to put one in place? Patient Reply: no    Gillian Joshua 1950 female who presents for routine immunizations. Patient denies any symptoms , reactions or allergies that would exclude them from being immunized today.   Risks and adverse reactions were discussed and the VIS was given to them. All questions were addressed. Order placed for HD FLU VACCINE,  per Verbal Order from [address] [place] [address] with read back. Patient was observed for 15 min post injection. There were no reactions observed.     Louann Stoll LPN

## 2019-10-24 ENCOUNTER — TELEPHONE (OUTPATIENT)
Dept: INTERNAL MEDICINE CLINIC | Age: 69
End: 2019-10-24

## 2019-10-24 DIAGNOSIS — R15.9 INCONTINENCE OF FECES, UNSPECIFIED FECAL INCONTINENCE TYPE: Primary | ICD-10-CM

## 2019-10-24 DIAGNOSIS — N32.81 OAB (OVERACTIVE BLADDER): Primary | ICD-10-CM

## 2019-10-24 RX ORDER — OXYBUTYNIN CHLORIDE 5 MG/1
5 TABLET ORAL 3 TIMES DAILY
Qty: 90 TAB | Refills: 5 | Status: SHIPPED | OUTPATIENT
Start: 2019-10-24 | End: 2019-10-24

## 2019-10-24 NOTE — TELEPHONE ENCOUNTER
Per  patient notified that she take imodium for the stool incontinence. Called [facility] for an appointment, they stated that the patient was seen on 10-17-19. Returned call to patient, and she was instructed to call [facility] for instructions or any testing needed. Patient verbalizes understanding.      Called [facility] to cancel order for ditropan as this was an error, per .

## 2019-10-24 NOTE — TELEPHONE ENCOUNTER
Pt calling says she saw Dr. Harini Beyer and was supposed to get a medication for incontinence and also have a test ordered.  She wasn't sure of the name of the test but said it was for her sphincter muscle

## 2019-10-24 NOTE — TELEPHONE ENCOUNTER
Per  patient notified that she take imodium for the stool incontinence. Called [facility] for an appointment, they stated that the patient was seen on 10-17-19. Returned call to patient, and she was instructed to call [facility] for instructions. Patient verbalizes understanding. Called [facility] to cancel order for ditropan as this was an error, per .

## 2019-11-02 ENCOUNTER — TELEPHONE (OUTPATIENT)
Dept: INTERNAL MEDICINE CLINIC | Age: 69
End: 2019-11-02

## 2019-11-02 DIAGNOSIS — B35.9 TINEA: Primary | ICD-10-CM

## 2019-11-02 RX ORDER — FLUCONAZOLE 100 MG/1
100 TABLET ORAL DAILY
Qty: 7 TAB | Refills: 4 | Status: SHIPPED | OUTPATIENT
Start: 2019-11-02 | End: 2020-10-06

## 2020-01-29 ENCOUNTER — PATIENT OUTREACH (OUTPATIENT)
Dept: INTERNAL MEDICINE CLINIC | Age: 70
End: 2020-01-29

## 2020-01-29 NOTE — PROGRESS NOTES
Complex Case Management      Date/Time:  2020 2:40 PM    Method of communication with patient:phone    [address] [place] [address] ([facility]) contacted the patient by telephone to perform Ambulatory Care Coordination. Verified name and  (PHI) with patient as identifiers. Provided introduction to self, and explanation of the Ambulatory Care Manager's role. Reviewed most recent clinic visit w/ patient who verbalized understanding. Patient given an opportunity to ask questions. Top Challenges reviewed with the patient   1. Spoke with patient  2. No issues at present  3. Med therapy going well without issues  4. No further issue with rash  5. Will follow per [facility] protocal     The patient agrees to contact the PCP office or the [address] [place] [address] for questions related to their healthcare. Provided contact information for future reference. Disease Specific:   N/A    Home Health Active: No    DME Active: No    Barriers to care? None    Advance Care Planning:   Does patient have an Advance Directive:  not on file; education provided     Medication(s):   Medication reconciliation was not performed with patient, who verbalizes understanding of administration of home medications. There were no barriers to obtaining medications identified at this time. Referral to Pharm D needed: no     Current Outpatient Medications   Medication Sig    vitamin a & d (A&D) ointment Apply  to affected area as needed for Skin Irritation or Itching.  zinc oxide (DESITIN) 13 % topical cream Apply  to affected area as needed for Skin Irritation or Itching.  Lactobacillus acidophilus (ACIDOPHILUS) cap Take 2 Caps by mouth two (2) times a day.  psyllium (METAMUCIL) packet Take 1 Packet by mouth daily as needed.  ketoconazole (NIZORAL) 2 % topical cream Apply  to affected area daily.  hydrocortisone (ANUSOL-HC) 2.5 % rectal cream Insert  into rectum four (4) times daily.     ergocalciferol (ERGOCALCIFEROL) 50,000 unit capsule Take 1 Cap by mouth every seven (7) days.  ondansetron (ZOFRAN ODT) 4 mg disintegrating tablet Take 1 Tab by mouth every eight (8) hours as needed for Nausea.  ALPRAZolam (XANAX) 0.25 mg tablet Take 1 Tab by mouth daily as needed for Anxiety. No current facility-administered medications for this visit. [facility] follow up appointment(s): No future appointments. Goals      Attend follow up appointments on schedule      1-29-20  Patient will attend all scheduled appointments through     3-29-20       Knowledge and adherence of prescribed medication (ie. action, side effects, missed dose, etc.).       1-29-20  Pt will take all medications prescribed to be evaluated on each outreach through      3-29-20       Prepare patients and caregivers for end of life decisions (ie. need for hospice, pain management, symptom relief, advance directives etc.)      1-29-20  Pt will complete an ACP and have it scanned into their EMR by     3-29-20

## 2020-02-03 ENCOUNTER — PATIENT OUTREACH (OUTPATIENT)
Dept: INTERNAL MEDICINE CLINIC | Age: 70
End: 2020-02-03

## 2020-02-10 ENCOUNTER — PATIENT OUTREACH (OUTPATIENT)
Dept: INTERNAL MEDICINE CLINIC | Age: 70
End: 2020-02-10

## 2020-02-17 ENCOUNTER — PATIENT OUTREACH (OUTPATIENT)
Dept: INTERNAL MEDICINE CLINIC | Age: 70
End: 2020-02-17

## 2020-02-17 NOTE — PROGRESS NOTES
Complex Case Management      Date/Time:  2020 2:40 PM    Method of communication with patient:phone    [facility] ([facility]) contacted the patient by telephone to perform Ambulatory Care Coordination. Verified name and  (PHI) with patient as identifiers. Provided introduction to self, and explanation of the Ambulatory Care Manager's role. Reviewed most recent clinic visit w/ patient who verbalized understanding. Patient given an opportunity to ask questions. Top Challenges reviewed with the patient   1. Spoke with patient  2. Does not need to set up any follow up appts until after manometery. 3. Anorectal manometry scheduled 3-18-20.  4. Med therapy going well without issues. 5. Will continue to follow patient per [facility] protocal     The patient agrees to contact the PCP office or the [address] [place] [address] for questions related to their healthcare. Provided contact information for future reference. Disease Specific:   N/A    Home Health Active: No    DME Active: No    Barriers to care? None    Advance Care Planning:   Does patient have an Advance Directive:  not on file; education provided     Medication(s):   Medication reconciliation was performed with patient, who verbalizes understanding of administration of home medications. There were no barriers to obtaining medications identified at this time. Referral to Pharm D needed: no     Current Outpatient Medications   Medication Sig    vitamin a & d (A&D) ointment Apply  to affected area as needed for Skin Irritation or Itching.  zinc oxide (DESITIN) 13 % topical cream Apply  to affected area as needed for Skin Irritation or Itching.  Lactobacillus acidophilus (ACIDOPHILUS) cap Take 2 Caps by mouth two (2) times a day.  psyllium (METAMUCIL) packet Take 1 Packet by mouth daily as needed.  ketoconazole (NIZORAL) 2 % topical cream Apply  to affected area daily.     hydrocortisone (ANUSOL-HC) 2.5 % rectal cream Insert  into rectum four (4) times daily.  ergocalciferol (ERGOCALCIFEROL) 50,000 unit capsule Take 1 Cap by mouth every seven (7) days.  ondansetron (ZOFRAN ODT) 4 mg disintegrating tablet Take 1 Tab by mouth every eight (8) hours as needed for Nausea.  ALPRAZolam (XANAX) 0.25 mg tablet Take 1 Tab by mouth daily as needed for Anxiety. No current facility-administered medications for this visit. Goals      Attend follow up appointments on schedule      1-29-20  Patient will attend all scheduled appointments through     3-29-20       Knowledge and adherence of prescribed medication (ie. action, side effects, missed dose, etc.).       1-29-20  Pt will take all medications prescribed to be evaluated on each outreach through      3-29-20       Prepare patients and caregivers for end of life decisions (ie. need for hospice, pain management, symptom relief, advance directives etc.)      1-29-20  Pt will complete an ACP and have it scanned into their EMR by     3-29-20

## 2020-03-05 ENCOUNTER — PATIENT OUTREACH (OUTPATIENT)
Dept: INTERNAL MEDICINE CLINIC | Age: 70
End: 2020-03-05

## 2020-03-05 NOTE — PROGRESS NOTES
Complex Case Management      Date/Time:  3/5/2020 2:40 PM    Method of communication with patient:phone    [facility]5 [facility] ([facility]) contacted the patient by telephone to perform Ambulatory Care Coordination. Verified name and  (PHI) with patient as identifiers. Provided introduction to self, and explanation of the Ambulatory Care Manager's role. Reviewed most recent clinic visit w/ patient who verbalized understanding. Patient given an opportunity to ask questions. Top Challenges reviewed with the patient   1. Spoke with patient  2. Does not need to set up any follow up appts until after manometery. 3. Anorectal manometry scheduled 3-18-20.  4. Patient states she does have an ACP but has not taken it to Dr. Krystal Johnson as of yet. Encouraged her to do so on next appointment. Also instructed to take it with her during procedures. 5. Med therapy going well without issues. Med rec completed earlier  6. Will continue to follow patient per [facility] protocal     The patient agrees to contact the PCP office or the [facility]5 [facility] for questions related to their healthcare. Provided contact information for future reference. Disease Specific:   N/A    Home Health Active: No    DME Active: No    Barriers to care? None    Advance Care Planning:   Does patient have an Advance Directive:  not on file; education provided     Medication(s):   Medication reconciliation was performed with patient, who verbalizes understanding of administration of home medications. There were no barriers to obtaining medications identified at this time. Referral to Pharm D needed: no     Current Outpatient Medications   Medication Sig    vitamin a & d (A&D) ointment Apply  to affected area as needed for Skin Irritation or Itching.  zinc oxide (DESITIN) 13 % topical cream Apply  to affected area as needed for Skin Irritation or Itching.  Lactobacillus acidophilus (ACIDOPHILUS) cap Take 2 Caps by mouth two (2) times a day.     psyllium (METAMUCIL) packet Take 1 Packet by mouth daily as needed.  ketoconazole (NIZORAL) 2 % topical cream Apply  to affected area daily.  hydrocortisone (ANUSOL-HC) 2.5 % rectal cream Insert  into rectum four (4) times daily.  ergocalciferol (ERGOCALCIFEROL) 50,000 unit capsule Take 1 Cap by mouth every seven (7) days.  ondansetron (ZOFRAN ODT) 4 mg disintegrating tablet Take 1 Tab by mouth every eight (8) hours as needed for Nausea.  ALPRAZolam (XANAX) 0.25 mg tablet Take 1 Tab by mouth daily as needed for Anxiety. No current facility-administered medications for this visit. Goals      Attend follow up appointments on schedule      1-29-20  Patient will attend all scheduled appointments through     3-29-20       Knowledge and adherence of prescribed medication (ie. action, side effects, missed dose, etc.).       1-29-20  Pt will take all medications prescribed to be evaluated on each outreach through      3-29-20       Prepare patients and caregivers for end of life decisions (ie. need for hospice, pain management, symptom relief, advance directives etc.)      1-29-20  Pt will complete an ACP and have it scanned into their EMR by     3-29-20

## 2020-09-25 DIAGNOSIS — B36.9 FUNGAL SKIN INFECTION: ICD-10-CM

## 2020-09-25 RX ORDER — KETOCONAZOLE 20 MG/G
CREAM TOPICAL
Qty: 60 G | Refills: 11 | Status: SHIPPED | OUTPATIENT
Start: 2020-09-25

## 2020-09-25 NOTE — TELEPHONE ENCOUNTER
Please schedule the pt for an in office 30 min visit or 30 min virtual visit on a Friday or Monday at 3:30pm - for a check up.     Dr. Kayla Livingston  [facility] [address], [address] [place] [address] Str.  Phone: [phone]  Fax: [phone]

## 2020-10-02 ENCOUNTER — VIRTUAL VISIT (OUTPATIENT)
Dept: INTERNAL MEDICINE CLINIC | Age: 70
End: 2020-10-02
Payer: MEDICARE

## 2020-10-02 DIAGNOSIS — Z00.00 MEDICARE ANNUAL WELLNESS VISIT, SUBSEQUENT: ICD-10-CM

## 2020-10-02 DIAGNOSIS — N76.0 RECURRENT VAGINITIS: ICD-10-CM

## 2020-10-02 DIAGNOSIS — Z12.31 ENCOUNTER FOR SCREENING MAMMOGRAM FOR BREAST CANCER: ICD-10-CM

## 2020-10-02 DIAGNOSIS — E78.2 MIXED HYPERLIPIDEMIA: ICD-10-CM

## 2020-10-02 DIAGNOSIS — Z86.2 HISTORY OF ANEMIA: ICD-10-CM

## 2020-10-02 DIAGNOSIS — F41.9 ANXIETY: ICD-10-CM

## 2020-10-02 DIAGNOSIS — M81.0 OSTEOPOROSIS, UNSPECIFIED OSTEOPOROSIS TYPE, UNSPECIFIED PATHOLOGICAL FRACTURE PRESENCE: Primary | ICD-10-CM

## 2020-10-02 DIAGNOSIS — Z71.89 ADVANCE CARE PLANNING: ICD-10-CM

## 2020-10-02 DIAGNOSIS — B35.9 TINEA: ICD-10-CM

## 2020-10-02 PROCEDURE — G8420 CALC BMI NORM PARAMETERS: HCPCS | Performed by: INTERNAL MEDICINE

## 2020-10-02 PROCEDURE — G0439 PPPS, SUBSEQ VISIT: HCPCS | Performed by: INTERNAL MEDICINE

## 2020-10-02 PROCEDURE — G9899 SCRN MAM PERF RSLTS DOC: HCPCS | Performed by: INTERNAL MEDICINE

## 2020-10-02 PROCEDURE — 1090F PRES/ABSN URINE INCON ASSESS: CPT | Performed by: INTERNAL MEDICINE

## 2020-10-02 PROCEDURE — 3017F COLORECTAL CA SCREEN DOC REV: CPT | Performed by: INTERNAL MEDICINE

## 2020-10-02 PROCEDURE — 1101F PT FALLS ASSESS-DOCD LE1/YR: CPT | Performed by: INTERNAL MEDICINE

## 2020-10-02 PROCEDURE — G8536 NO DOC ELDER MAL SCRN: HCPCS | Performed by: INTERNAL MEDICINE

## 2020-10-02 PROCEDURE — 99214 OFFICE O/P EST MOD 30 MIN: CPT | Performed by: INTERNAL MEDICINE

## 2020-10-02 PROCEDURE — G8427 DOCREV CUR MEDS BY ELIG CLIN: HCPCS | Performed by: INTERNAL MEDICINE

## 2020-10-02 PROCEDURE — G9717 DOC PT DX DEP/BP F/U NT REQ: HCPCS | Performed by: INTERNAL MEDICINE

## 2020-10-02 RX ORDER — FLUCONAZOLE 150 MG/1
150 TABLET ORAL DAILY
Qty: 1 TAB | Refills: 4 | Status: SHIPPED | OUTPATIENT
Start: 2020-10-02 | End: 2020-10-03

## 2020-10-02 NOTE — PROGRESS NOTES
Norma Tran is a 81 y.o. female who was seen by synchronous (real-time) audio-video technology on 10/2/2020. Assessment & Plan:   1. Osteoporosis:  - We discussed going to a specialist for osteoporosis rx. She will think about it and let me know if she wants to go to one at this time per our discussion. 2. HLD:   - Checking labs    3. Heatlh Maintenance:   - She is ok with getting her flu shot in our office.  - Mammogram ordered     4. Anxiety: Resolved. Observation. 5. Recurrent Yeast Infections:  - Fluconazole prescribed prn    6. H/o Anemia:  - Checking a CBC      Lab review: labs are reviewed in the EHR and ordered as mentioned above     I have discussed the diagnosis with the patient and the intended plan as seen in the above orders. I have discussed medication side effects and warnings with the patient as well. I have reviewed the plan of care with the patient, accepted their input and they are in agreement with the treatment goals. All questions were answered. The patient understands the plan of care. Pt instructed if symptoms worsen to call the office or report to the ED for continued care. Greater than 50% of the visit time was spent in counseling and/or coordination of care. Voice recognition was used to generate this report, which may have resulted in some phonetic based errors in grammar and contents. Even though attempts were made to correct all the mistakes, some may have been missed, and remained in the body of the document. Subjective:   Norma Tran was seen for   Chief Complaint   Patient presents with    Follow-up     The patient is a 75-year-old female with history of allergic rhinitis, hemorrhoids, osteoporosis, cataracts, HLD, diverticulitis, and depression/anxiety (rarely has anxiety with flying). 1. Anxiety:  Treated with xanax prn. Today she reports: her anxiety is better. 2. HLD: Her last LDL was 154. She is not on a cholesterol lowering medication. 3. Osteoporosis: She had a DEXA scan since her last apt via a commercial test. She was diagnosed as having osteoporosis. +Fracture hx (arm). 4. Health Maintenance:  - She is ok with getting the flu shot in our office.  - She had an eye exam this year. +Cataracts. - She wants her kids to be her [de-identified]. She has an advance directive but we do not have records. She wants to be DNR/DNI.   - 3/3 short item recall    5. Recurrent Yeast Infections: Responsive to diflucan. She is asking to have diflucan just in case her sx return. She is presently asymptomatic. 6. H/o Anemia: S/p diverticulitis related surgery. Her last CBC showed a mildly low hemoglobin of 11.7. MCV was normal. The pt denies bleeding hx. Prior to Admission medications    Medication Sig Start Date End Date Taking? Authorizing Provider   ketoconazole (NIZORAL) 2 % topical cream APPLY EXTERNALLY TO THE AFFECTED AREA DAILY 9/25/20   Kira Langford MD   vitamin a & d (A&D) ointment Apply  to affected area as needed for Skin Irritation or Itching. Provider, Historical   zinc oxide (DESITIN) 13 % topical cream Apply  to affected area as needed for Skin Irritation or Itching. Provider, Historical   Lactobacillus acidophilus (ACIDOPHILUS) cap Take 2 Caps by mouth two (2) times a day. Provider, Historical   psyllium (METAMUCIL) packet Take 1 Packet by mouth daily as needed. Provider, Historical   hydrocortisone (ANUSOL-HC) 2.5 % rectal cream Insert  into rectum four (4) times daily. 8/28/19   Kira Langford MD   ergocalciferol (ERGOCALCIFEROL) 50,000 unit capsule Take 1 Cap by mouth every seven (7) days. 8/4/19   Kira Langford MD   ondansetron (ZOFRAN ODT) 4 mg disintegrating tablet Take 1 Tab by mouth every eight (8) hours as needed for Nausea. 6/9/19   MALKA Allison   ALPRAZolam (XANAX) 0.25 mg tablet Take 1 Tab by mouth daily as needed for Anxiety.  3/29/19   Patricia Lyles NP     Allergies   Allergen Reactions    Septra [Sulfamethoprim Ds] Rash    Sulfamethoxazole-Trimethoprim Rash     Past Medical History:   Diagnosis Date    Depression     Diverticulitis      Past Surgical History:   Procedure Laterality Date    FLEXIBLE SIGMOIDOSCOPY N/A 7/16/2019    SIGMOIDOSCOPY FLEXIBLE (No endo tech needed) performed by Janell Guzman MD at [address] [place] [address] HX COLONOSCOPY  2007; 2/2019    neg; 2nd colo showed diverticula    HX OTHER SURGICAL      Right arm    LAP,SURG,COLECTOMY,W/ANAST N/A 07/16/2019    Dr. Sofya Fuentes     Family History   Problem Relation Age of Onset    Hypertension Father     Heart Disease Father     No Known Problems Mother     Heart Attack Brother         age 48    Heart Attack Paternal Grandfather         age 72    Heart Attack Brother         age 62     Social History     Socioeconomic History    Marital status:      Spouse name: Not on file    Number of children: Not on file    Years of education: Not on file    Highest education level: Not on file   Tobacco Use    Smoking status: Never Smoker    Smokeless tobacco: Never Used   Substance and Sexual Activity    Alcohol use: Yes     Comment: occassionally    Drug use: No       ROS:  Gen: No fever/chills  HEENT: No sore throat, eye pain, ear pain, or congestion.  No HA  CV: No CP  Resp: No cough/SOB  GI: No abdominal pain  : No hematuria/dysuria  Derm: No rash  Neuro: No new paresthesias/weakness  Musc: No new myalgias/jt pain  Psych: No depression sx      Objective:     General: alert, cooperative, no distress   Mental  status: mental status: alert, oriented to person, place, and time, normal mood, behavior, speech, dress, motor activity, and thought processes   Resp: resp: normal effort and no respiratory distress   Neuro: neuro: no gross deficits   Skin: skin: no discoloration or lesions of concern on visible areas     LABS:  Lab Results   Component Value Date/Time    Sodium 140 08/16/2019 09:33 AM    Potassium 3.7 08/16/2019 09:33 AM    Chloride 106 08/16/2019 09:33 AM    CO2 24 08/16/2019 09:33 AM    Anion gap 10 08/16/2019 09:33 AM    Glucose 88 08/16/2019 09:33 AM    BUN 11 08/16/2019 09:33 AM    Creatinine 0.65 08/16/2019 09:33 AM    BUN/Creatinine ratio 17 08/16/2019 09:33 AM    GFR est AA >60 08/16/2019 09:33 AM    GFR est non-AA >60 08/16/2019 09:33 AM    Calcium 8.8 08/16/2019 09:33 AM       Lab Results   Component Value Date/Time    Cholesterol, total 252 (H) 08/16/2019 09:33 AM    HDL Cholesterol 73 (H) 08/16/2019 09:33 AM    LDL, calculated 154.8 (H) 08/16/2019 09:33 AM    VLDL, calculated 24.2 08/16/2019 09:33 AM    Triglyceride 121 08/16/2019 09:33 AM    CHOL/HDL Ratio 3.5 08/16/2019 09:33 AM       Lab Results   Component Value Date/Time    WBC 6.3 08/16/2019 09:33 AM    HGB 11.7 (L) 08/16/2019 09:33 AM    HCT 35.4 08/16/2019 09:33 AM    PLATELET 403 83/31/0533 09:33 AM    MCV 94.1 08/16/2019 09:33 AM       Lab Results   Component Value Date/Time    Hemoglobin A1c 5.3 08/16/2019 09:33 AM       Lab Results   Component Value Date/Time    TSH 1.24 08/16/2019 09:33 AM           Due to this being a TeleHealth  evaluation, many elements of the physical examination are unable to be assessed. The pt was seen by synchronous (real-time) audio-video technology, and/or her healthcare decision maker, is aware that this patient-initiated, Telehealth encounter is a billable service, with coverage as determined by her insurance carrier. She is aware that she may receive a bill and has provided verbal consent to proceed: Yes. The pt is being evaluated by a video visit encounter for concerns as above. A caregiver was present when appropriate. Due to this being a TeleHealth encounter (During [facility]- public health emergency), evaluation of the following organ systems was limited: Vitals/Constitutional/EENT/Resp/CV/GI//MS/Neuro/Skin/Heme-Lymph-Imm.    Pursuant to the emergency declaration under the [place] Act, 1135 waiver authority and the Coronavirus Preparedness and Response Supplemental Appropriations Act, this Virtual  Visit was conducted, with patient's (and/or legal guardian's) consent, to reduce the patient's risk of exposure to COVID-19 and provide necessary medical care. Services were provided through a video synchronous discussion virtually to substitute for in-person clinic visit. Patient and provider were located at their individual homes. We discussed the expected course, resolution and complications of the diagnosis(es) in detail. Medication risks, benefits, costs, interactions, and alternatives were discussed as indicated. I advised her to contact the office if her condition worsens, changes or fails to improve as anticipated. She expressed understanding with the diagnosis(es) and plan.      Mushtaq Posada MD

## 2020-10-06 RX ORDER — FLUCONAZOLE 100 MG/1
TABLET ORAL
Qty: 7 TAB | Refills: 4 | Status: SHIPPED | OUTPATIENT
Start: 2020-10-06

## 2020-10-11 PROBLEM — D64.9 ANEMIA: Status: RESOLVED | Noted: 2019-07-21 | Resolved: 2020-10-11

## 2020-10-11 PROBLEM — E87.6 HYPOKALEMIA: Status: RESOLVED | Noted: 2019-07-21 | Resolved: 2020-10-11

## 2020-10-11 PROBLEM — M81.0 OSTEOPOROSIS: Status: ACTIVE | Noted: 2020-10-11

## 2020-10-11 NOTE — ACP (ADVANCE CARE PLANNING)
Advance Care Planning  Advance Care Planning (ACP) Provider Conversation     Date of ACP Conversation: 10/02/20  Persons included in Conversation:  patient    Authorized Decision Maker (if patient is incapable of making informed decisions): This person is:   Next of Kin by law (only applies in absence of above)          For Patients with Decision Making Capacity:   Values/Goals: Exploration of values, goals, and preferences if recovery is not expected, even with continued medical treatment in the event of:  Imminent death  Severe, permanent brain injury    Conversation Outcomes / Follow-Up Plan:   Recommended completion of Advance Directive. We will mail her a copy of her advance directive to complete. I encouraged her to complete her advance directive. She wants her children to be her POAs - their contact info is already in her chart. She states today that she wants to be DNR/DNI if these resuscitative efforts will not help her to improve/recover.      Dr. Miles Blanco  [facility] [address], [address] [place] [address] Str.  Phone: [phone]  Fax: [phone]

## 2020-10-11 NOTE — PATIENT INSTRUCTIONS
Medicare Wellness Visit, Female The best way to live healthy is to have a lifestyle where you eat a well-balanced diet, exercise regularly, limit alcohol use, and quit all forms of tobacco/nicotine, if applicable. Regular preventive services are another way to keep healthy. Preventive services (vaccines, screening tests, monitoring & exams) can help personalize your care plan, which helps you manage your own care. Screening tests can find health problems at the earliest stages, when they are easiest to treat. [facility] follows the current, evidence-based guidelines published by the [facility] ([facility]) when recommending preventive services for our patients. Because we follow these guidelines, sometimes recommendations change over time as research supports it. (For example, mammograms used to be recommended annually. Even though Medicare will still pay for an annual mammogram, the newer guidelines recommend a mammogram every two years for women of average risk). Of course, you and your doctor may decide to screen more often for some diseases, based on your risk and your co-morbidities (chronic disease you are already diagnosed with). Preventive services for you include: - Medicare offers their members a free annual wellness visit, which is time for you and your primary care provider to discuss and plan for your preventive service needs. Take advantage of this benefit every year! 
-All adults over the age of 72 should receive the recommended pneumonia vaccines. Current USPSTF guidelines recommend a series of two vaccines for the best pneumonia protection.  
-All adults should have a flu vaccine yearly and a tetanus vaccine every 10 years.  
-All adults age 48 and older should receive the shingles vaccines (series of two vaccines). -All adults age 38-68 who are overweight should have a diabetes screening test once every three years. -All adults born between 80 and 1965 should be screened once for Hepatitis C. 
-Other screening tests and preventive services for persons with diabetes include: an eye exam to screen for diabetic retinopathy, a kidney function test, a foot exam, and stricter control over your cholesterol.  
-Cardiovascular screening for adults with routine risk involves an electrocardiogram (ECG) at intervals determined by your doctor.  
-Colorectal cancer screenings should be done for adults age 54-65 with no increased risk factors for colorectal cancer. There are a number of acceptable methods of screening for this type of cancer. Each test has its own benefits and drawbacks. Discuss with your doctor what is most appropriate for you during your annual wellness visit. The different tests include: colonoscopy (considered the best screening method), a fecal occult blood test, a fecal DNA test, and sigmoidoscopy. 
 
-A bone mass density test is recommended when a woman turns 65 to screen for osteoporosis. This test is only recommended one time, as a screening. Some providers will use this same test as a disease monitoring tool if you already have osteoporosis. -Breast cancer screenings are recommended every other year for women of normal risk, age 54-69. 
-Cervical cancer screenings for women over age 72 are only recommended with certain risk factors. Here is a list of your current Health Maintenance items (your personalized list of preventive services) with a due date: 
Health Maintenance Due Topic Date Due  
 DTaP/Tdap/Td  (1 - Tdap) 12/04/1973  Shingles Vaccine (1 of 2) 12/04/2002  Glaucoma Screening   12/04/2017  Bone Mineral Density   12/04/2017  Mammogram  05/12/2019 [place] Annual Well Visit  07/22/2020  Yearly Flu Vaccine (1) 09/01/2020 Medicare Part B Preventive Services Limitations Recommendation Scheduled Bone Mass Measurement (age 72 & older, biennial) Requires diagnosis related to osteoporosis or estrogen deficiency. Biennial benefit unless patient has history of long-term glucocorticoid tx or baseline is needed because initial test was by other method This should be done at age 72 and again if on osteoporosis medication at 2-3 year intervals. Discussed Cardiovascular Screening Blood Tests (every 5 years) Total cholesterol, HDL, Triglycerides Order as a panel if possible We should check your cholesterol panel at least once every 5 years. Up to date Colorectal Cancer Screening 
-Fecal occult blood test (annual) -Flexible sigmoidoscopy (5y) 
-Screening colonoscopy (10y) -Barium Enema  Due per your Gastroenterologist's recommendations. Up to date Counseling to Prevent Tobacco Use (up to 8 sessions per year) - Counseling greater than 3 and up to 10 minutes - Counseling greater than 10 minutes Patients must be asymptomatic of tobacco-related conditions to receive as preventive service  Not applicable Diabetes Screening Tests (at least every 3 years, Medicare covers annually or at 6-month intervals for prediabetic patients) Fasting blood sugar (FBS) or glucose tolerance test (GTT) Patient must be diagnosed with one of the following: 
-Hypertension, Dyslipidemia, obesity, previous impaired FBS or GTT 
Or any two of the following: overweight, FH of diabetes, age ? 72, history of gestational diabetes, birth of baby weighing more than 9 pounds Should be done yearly Up to date Diabetes Self-Management Training (DSMT) (no USPSTF recommendation) Requires referral by treating physician for patient with diabetes or renal disease. 10 hours of initial DSMT session of no less than 30 minutes each in a continuous 12-month period. 2 hours of follow-up DSMT in subsequent years. Not applicable Not applicable Glaucoma Screening (no USPSTF recommendation) Diabetes mellitus, family history, , age 48 or over,  American, age 72 or over Continue with annual eye exams. Up to date but we don't have records of your last exam  
Human Immunodeficiency Virus (HIV) Screening (annually for increased risk patients) HIV-1 and HIV-2 by EIA, THALIA, rapid antibody test, or oral mucosa transudate Patient must be at increased risk for HIV infection per USPSTF guidelines or pregnant. Tests covered annually for patients at increased risk. Pregnant patients may receive up to 3 test during pregnancy. Not applicable Not applicable Medical Nutrition Therapy (MNT) (for diabetes or renal disease not recommended schedule) Requires referral by treating physician for patient with diabetes or renal disease. Can be provided in same year as diabetes self-management training (DSMT), and CMS recommends medical nutrition therapy take place after DSMT. Up to 3 hours for initial year and 2 hours in subsequent years. Not applicable Not applicable Shingles Vaccination A shingles vaccine is also recommended once in a lifetime after age 61 Vaccination recommended for shingles vaccination. Overdue Seasonal Influenza Vaccination (annually)  Continue with yearly \"flu\" shot annually Overdue Pneumococcal Vaccination (once after 65)  Please receive this vaccination at age 72. Up to date Hepatitis B Vaccinations (if medium/high risk) Medium/high risk factors:  End-stage renal disease, Hemophiliacs who received Factor VIII or IX concentrates, Clients of institutions for the mentally retarded, Persons who live in the same house as a HepB virus carrier, Homosexual men, Illicit injectable drug abusers. Not applicable Not applicable Screening Mammography (biennial age 54-69) Annually (age 36 or over) You need a mammogram yearly to screen for breast cancer. Ordered today Screening Pap Tests and Pelvic Examination (up to age 79 and after 79 if unknown history or abnormal study last 10 years) Every 24 months except high risk You need no Pap smear at this time. Not warranted at this time. Ultrasound Screening for Abdominal Aortic Aneurysm (AAA) (once) Patient must be referred through IPPE and not have had a screening for abdominal aortic aneurysm before under Medicare. Limited to patients who meet one of the following criteria: 
- Men who are 73-68 years old and have smoked more than 100 cigarettes in their lifetime. 
-Anyone with a FH of AAA 
-Anyone recommended for screening by USPSTF Not applicable Not applicable Well Visit, Over 72: Care Instructions Your Care Instructions Physical exams can help you stay healthy. Your doctor has checked your overall health and may have suggested ways to take good care of yourself. He or she also may have recommended tests. At home, you can help prevent illness with healthy eating, regular exercise, and other steps. Follow-up care is a key part of your treatment and safety. Be sure to make and go to all appointments, and call your doctor if you are having problems. It's also a good idea to know your test results and keep a list of the medicines you take. How can you care for yourself at home? · Reach and stay at a healthy weight. This will lower your risk for many problems, such as obesity, diabetes, heart disease, and high blood pressure. · Get at least 30 minutes of exercise on most days of the week. Walking is a good choice. You also may want to do other activities, such as running, swimming, cycling, or playing tennis or team sports. · Do not smoke. Smoking can make health problems worse. If you need help quitting, talk to your doctor about stop-smoking programs and medicines. These can increase your chances of quitting for good. · Protect your skin from too much sun.  When you're outdoors from 10 a.m. to 4 p.m., stay in the shade or cover up with clothing and a hat with a wide brim. Wear sunglasses that block UV rays. Even when it's cloudy, put broad-spectrum sunscreen (SPF 30 or higher) on any exposed skin. · See a dentist one or two times a year for checkups and to have your teeth cleaned. · Wear a seat belt in the car. Follow your doctor's advice about when to have certain tests. These tests can spot problems early. For men and women · Cholesterol. Your doctor will tell you how often to have this done based on your overall health and other things that can increase your risk for heart attack and stroke. · Blood pressure. Have your blood pressure checked during a routine doctor visit. Your doctor will tell you how often to check your blood pressure based on your age, your blood pressure results, and other factors. · Diabetes. Ask your doctor whether you should have tests for diabetes. · Vision. Experts recommend that you have yearly exams for glaucoma and other age-related eye problems. · Hearing. Tell your doctor if you notice any change in your hearing. You can have tests to find out how well you hear. · Colon cancer tests. Keep having colon cancer tests as your doctor recommends. You can have one of several types of tests. · Heart attack and stroke risk. At least every 4 to 6 years, you should have your risk for heart attack and stroke assessed. Your doctor uses factors such as your age, blood pressure, cholesterol, and whether you smoke or have diabetes to show what your risk for a heart attack or stroke is over the next 10 years. · Osteoporosis. Talk to your doctor about whether you should have a bone density test to find out whether you have thinning bones. Ask your doctor if you need to take a calcium plus vitamin D supplement. You may be able to get enough calcium and vitamin D through your diet. For women · Pap test and pelvic exam. You may no longer need a Pap test. Talk with your doctor about whether to stop or continue to have Pap tests. · Breast exam and mammogram. Ask how often you should have a mammogram, which is an X-ray of your breasts. A mammogram can spot breast cancer before it can be felt and when it is easiest to treat. · Thyroid disease. Talk to your doctor about whether to have your thyroid checked as part of a regular physical exam. Women have an increased chance of a thyroid problem. For men · Prostate exam. Talk to your doctor about whether you should have a blood test (called a PSA test) for prostate cancer. Experts recommend that you discuss the benefits and risks of the test with your doctor before you decide whether to have this test. Some experts say that men ages 79 and older no longer need testing. · Abdominal aortic aneurysm. Ask your doctor whether you should have a test to check for an aneurysm. You may need a test if you ever smoked or if your parent, brother, sister, or child has had an aneurysm. When should you call for help? Watch closely for changes in your health, and be sure to contact your doctor if you have any problems or symptoms that concern you. Where can you learn more? Go to http://www.gray.com/ Enter C783 in the search box to learn more about \"Well Visit, Over 65: Care Instructions. \" Current as of: May 27, 2020               Content Version: 12.6 © 0764-6534 [employer]. Care instructions adapted under license by [employer] [facility] (which disclaims liability or warranty for this information). If you have questions about a medical condition or this instruction, always ask your healthcare professional. [place] [address] any warranty or liability for your use of this information.

## 2020-10-11 NOTE — PROGRESS NOTES
INTERNISTS OF [facility]:  10/11/20, [id number]      The Subsequent Medicare Annual Wellness Exam PROGRESS NOTE    This is a [employer] Exam (AWV). I have reviewed the patient's medical history in detail and updated the computerized patient record. Barbara Littlejohn is a 81 y.o.  female and presents for an annual wellness exam.    SUBJECTIVE    Past Medical History:   Diagnosis Date    Depression     Diverticulitis       Past Surgical History:   Procedure Laterality Date    FLEXIBLE SIGMOIDOSCOPY N/A 7/16/2019    SIGMOIDOSCOPY FLEXIBLE (No endo tech needed) performed by Janell Guzman MD at [address] [place] [address] HX COLONOSCOPY  2007; 2/2019    neg; 2nd colo showed diverticula    HX OTHER SURGICAL      Right arm    LAP,SURG,COLECTOMY,W/ANAST N/A 07/16/2019    Dr. Sofya Fuentes     Current Outpatient Medications   Medication Sig Dispense Refill    fluconazole (DIFLUCAN) 100 mg tablet TAKE 1 TABLET BY MOUTH EVERY DAY FOR 7 DAYS 7 Tab 4    ketoconazole (NIZORAL) 2 % topical cream APPLY EXTERNALLY TO THE AFFECTED AREA DAILY 60 g 11    vitamin a & d (A&D) ointment Apply  to affected area as needed for Skin Irritation or Itching.  zinc oxide (DESITIN) 13 % topical cream Apply  to affected area as needed for Skin Irritation or Itching.  Lactobacillus acidophilus (ACIDOPHILUS) cap Take 2 Caps by mouth two (2) times a day.  psyllium (METAMUCIL) packet Take 1 Packet by mouth daily as needed.  hydrocortisone (ANUSOL-HC) 2.5 % rectal cream Insert  into rectum four (4) times daily. 30 g 11    ergocalciferol (ERGOCALCIFEROL) 50,000 unit capsule Take 1 Cap by mouth every seven (7) days. 4 Cap 6    ondansetron (ZOFRAN ODT) 4 mg disintegrating tablet Take 1 Tab by mouth every eight (8) hours as needed for Nausea.  14 Tab 0     Allergies   Allergen Reactions    Septra [Sulfamethoprim Ds] Rash    Sulfamethoxazole-Trimethoprim Rash     Family History   Problem Relation Age of Onset    Hypertension Father     Heart Disease Father     No Known Problems Mother     Heart Attack Brother         age 48    Heart Attack Paternal Grandfather         age 72    Heart Attack Brother         age 62     Social History     Tobacco Use    Smoking status: Never Smoker    Smokeless tobacco: Never Used   Substance Use Topics    Alcohol use: Yes     Comment: occassionally     Patient Active Problem List   Diagnosis Code    Depression F32.9    Mixed hyperlipidemia E78.2    Diverticulitis K57.92    Allergic rhinitis J30.9    Hypokalemia E87.6    Anemia D64.9     The patient is a 75-year-old female with history of allergic rhinitis, hemorrhoids, osteoporosis, cataracts, HLD, diverticulitis, and depression/anxiety (rarely has anxiety with flying). Health Maintenance History  Immunizations reviewed: Tdap over-due   Pneumovax: up to date   Flu: over-due  Zoster: over-due    Immunization History   Administered Date(s) Administered    Influenza Vaccine MyFab) PF (>6 Mo Flulaval, Fluarix, and >3 Yrs Afluria, Fluzone 38827) 02/03/2016, 01/08/2018    Influenza Vaccine (Tri) Adjuvanted (>65 Yrs FLUAD TRI 31304) 11/14/2018, 10/08/2019    Pneumococcal Conjugate (PCV-13) 04/03/2018    Pneumococcal Polysaccharide (PPSV-23) 01/02/2016       Colonoscopy: Up to date. No bleeding. Eye exam: Overdue per our records. She is up to date but we don't have records. Mammo: Up to date. Dexascan: Up to date. +H/o osteoporosis     Pelvic/Pap: No bleeding      Review of Systems   Constitutional: Negative for chills and fever. HENT: Negative for ear pain and sore throat. Eyes: Negative for blurred vision and pain. Respiratory: Negative for cough and shortness of breath. Cardiovascular: Negative for chest pain. Gastrointestinal: Negative for abdominal pain, blood in stool and melena. Genitourinary: Negative for dysuria and hematuria.    Musculoskeletal: Negative for joint pain and myalgias. Skin: Negative for rash. Neurological: Negative for tingling, focal weakness and headaches. Endo/Heme/Allergies: Does not bruise/bleed easily. Psychiatric/Behavioral: Negative for depression and substance abuse. Depression Risk Factor Screening:      Patient Health Questionnaire (PHQ-2)   Over the last 2 weeks, how often have you been bothered by any of the following problems? · Little interest or pleasure in doing things? · Not at all. [0]  · Feeling down, depressed, or hopeless? · Not at all. [0]    Total Score: 0/6  PHQ-2 Assessment Scoring:   A score of 2 or more requires further screening with the PHQ-9    Alcohol Risk Factor Screening:   Women: On any occasion during the past 3 months, have you had more than 3 drinks containing alcohol? no    Do you average more than 7 drinks per week? no    Tobacco Use Screening:     Social History     Tobacco Use   Smoking Status Never Smoker   Smokeless Tobacco Never Used       Hearing Loss    Hearing is good. Activities of Daily Living   Self-care. Requires assistance with: no ADLs  She does not need assistance with her ADLs/IADLs    Fall Risk   No fall risk factors; no falls within the past year    Abuse Screen   None    Additional Examination Findings: There were no vitals filed for this visit. There is no height or weight on file to calculate BMI. Evaluation of Cognitive Function:  Mood/affect: Euthymic  Appearance: Well-groomed  Family member/caregiver input: She is not with a family member during her visit      General:   Well-nourished, well-groomed, pleasant, alert, in no acute distress. Head:  Normocephalic, atraumatic  Ears:  External ears WNL  Eyes:  Clear sclera  Neuro:   Alert, conversant, appropriate, following commands  Skin:    No rashes noted  Psych:  Affect, mood and judgment appropriate      Dementia Screen:   Three Item Recall: 3/3      LABS   Data Review:   Lab Results   Component Value Date/Time    Sodium 140 08/16/2019 09:33 AM    Potassium 3.7 08/16/2019 09:33 AM    Chloride 106 08/16/2019 09:33 AM    CO2 24 08/16/2019 09:33 AM    Anion gap 10 08/16/2019 09:33 AM    Glucose 88 08/16/2019 09:33 AM    BUN 11 08/16/2019 09:33 AM    Creatinine 0.65 08/16/2019 09:33 AM    BUN/Creatinine ratio 17 08/16/2019 09:33 AM    GFR est AA >60 08/16/2019 09:33 AM    GFR est non-AA >60 08/16/2019 09:33 AM    Calcium 8.8 08/16/2019 09:33 AM       Lab Results   Component Value Date/Time    WBC 6.3 08/16/2019 09:33 AM    HGB 11.7 (L) 08/16/2019 09:33 AM    HCT 35.4 08/16/2019 09:33 AM    PLATELET 046 86/92/6905 09:33 AM    MCV 94.1 08/16/2019 09:33 AM       Lab Results   Component Value Date/Time    Hemoglobin A1c 5.3 08/16/2019 09:33 AM       Lab Results   Component Value Date/Time    Cholesterol, total 252 (H) 08/16/2019 09:33 AM    HDL Cholesterol 73 (H) 08/16/2019 09:33 AM    LDL, calculated 154.8 (H) 08/16/2019 09:33 AM    VLDL, calculated 24.2 08/16/2019 09:33 AM    Triglyceride 121 08/16/2019 09:33 AM    CHOL/HDL Ratio 3.5 08/16/2019 09:33 AM       Patient Care Team:  Johnson Banks MD as PCP - General (Family Medicine)  Johnson Banks MD as PCP - [address] [place] [address] Provider  REHAN Rizzo MD as Physician (Colon and Rectal Surgery)  Kathryn Padilla, AMOL as Ambulatory Care Manager    End-of-life planning  Advanced Directive in the case than an injury or illness causes the patient to be unable to make health care decisions was discussed with the patient.      Advice/Referrals/Counselling/Plan:   Education and counseling provided:  Are appropriate based on today's review and evaluation  End-of-Life planning (with patient's consent)  Pneumococcal Vaccine  Influenza Vaccine  Screening Mammography  Screening Pap and pelvic (covered once every 2 years)  Colorectal cancer screening tests  Cardiovascular screening blood test  Bone mass measurement (DEXA)  Screening for glaucoma  Diabetes screening test  Include in education list (weight loss, physical activity, smoking cessation, fall prevention, and nutrition)    ICD-10-CM ICD-9-CM    1. Mixed hyperlipidemia  G15.3 203.7 METABOLIC PANEL, COMPREHENSIVE      LIPID PANEL   2. Anxiety  F41.9 300.00    3. Encounter for screening mammogram for breast cancer  Z12.31 V76.12 [facility] MAMMO BI SCREENING INCL CAD   4. Recurrent vaginitis  N76.0 616.10 fluconazole (DIFLUCAN) 150 mg tablet     reviewed diet, exercise and weight control. Brief written plan, checklist    Assessment/Plan:    Health Maintenance:  - Mammogram ordered  - I encouraged her to get recommended vaccinations/screenings. We need to get her formal eye exam from this past year. ORDERS:  - [facility] MAMMO BI SCREENING INCL CAD; Future        Lab review: labs are reviewed in the [address] [place] [address] (ACP) Provider Conversation     Date of ACP Conversation: 10/2/20  Persons included in Conversation:  patient    Authorized Decision Maker (if patient is incapable of making informed decisions): This person is:   Next of Kin by law (only applies in absence of above)          For Patients with Decision Making Capacity:   Values/Goals: Exploration of values, goals, and preferences if recovery is not expected, even with continued medical treatment in the event of:  Imminent death  Severe, permanent brain injury    Conversation Outcomes / Follow-Up Plan:   Recommended completion of Advance Directive. We will mail her a copy of her advance directive to complete. I encouraged her to complete her advance directive. She wants her children to be her POAs - their contact info is already in her chart. She states today that she wants to be DNR/DNI if these resuscitative efforts will not help her to improve/recover. Due to this being a TeleHealth  evaluation, many elements of the physical examination are unable to be assessed.      The pt was seen by synchronous (real-time) audio-video technology, and/or her healthcare decision maker, is aware that this patient-initiated, Telehealth encounter is a billable service, with coverage as determined by her insurance carrier. She is aware that she may receive a bill and has provided verbal consent to proceed: Yes. The pt is being evaluated by a video visit encounter for concerns as above. A caregiver was present when appropriate. Due to this being a TeleHealth encounter (During XXSWQ-69 public health emergency), evaluation of the following organ systems was limited: Vitals/Constitutional/EENT/Resp/CV/GI//MS/Neuro/Skin/Heme-Lymph-Imm. Pursuant to the emergency declaration under the [facility], 1135 waiver authority and the [employer] and [employer] Act, this Virtual  Visit was conducted, with patient's (and/or legal guardian's) consent, to reduce the patient's risk of exposure to COVID-19 and provide necessary medical care. Services were provided through a video synchronous discussion virtually to substitute for in-person clinic visit. Patient and provider were located at their individual homes.

## 2020-10-23 ENCOUNTER — HOSPITAL ENCOUNTER (OUTPATIENT)
Dept: LAB | Age: 70
Discharge: HOME | End: 2020-10-23
Payer: MEDICARE

## 2020-10-23 ENCOUNTER — APPOINTMENT (OUTPATIENT)
Dept: INTERNAL MEDICINE CLINIC | Age: 70
End: 2020-10-23

## 2020-10-23 DIAGNOSIS — E78.2 MIXED HYPERLIPIDEMIA: ICD-10-CM

## 2020-10-23 DIAGNOSIS — Z86.2 HISTORY OF ANEMIA: ICD-10-CM

## 2020-10-23 LAB
ALBUMIN SERPL-MCNC: 3.7 G/DL (ref 3.4–5)
ALBUMIN/GLOB SERPL: 1 {RATIO} (ref 0.8–1.7)
ALP SERPL-CCNC: 100 U/L (ref 45–117)
ALT SERPL-CCNC: 15 U/L (ref 13–56)
ANION GAP SERPL CALC-SCNC: 3 MMOL/L (ref 3–18)
AST SERPL-CCNC: 22 U/L (ref 10–38)
BASOPHILS # BLD: 0.1 K/UL (ref 0–0.1)
BASOPHILS NFR BLD: 1 % (ref 0–2)
BILIRUB SERPL-MCNC: 0.4 MG/DL (ref 0.2–1)
BUN SERPL-MCNC: 20 MG/DL (ref 7–18)
BUN/CREAT SERPL: 26 (ref 12–20)
CALCIUM SERPL-MCNC: 9.3 MG/DL (ref 8.5–10.1)
CHLORIDE SERPL-SCNC: 110 MMOL/L (ref 100–111)
CHOLEST SERPL-MCNC: 275 MG/DL
CO2 SERPL-SCNC: 28 MMOL/L (ref 21–32)
CREAT SERPL-MCNC: 0.76 MG/DL (ref 0.6–1.3)
DIFFERENTIAL METHOD BLD: ABNORMAL
EOSINOPHIL # BLD: 0.1 K/UL (ref 0–0.4)
EOSINOPHIL NFR BLD: 3 % (ref 0–5)
ERYTHROCYTE [DISTWIDTH] IN BLOOD BY AUTOMATED COUNT: 12.9 % (ref 11.6–14.5)
GLOBULIN SER CALC-MCNC: 3.6 G/DL (ref 2–4)
GLUCOSE SERPL-MCNC: 95 MG/DL (ref 74–99)
HCT VFR BLD AUTO: 37.4 % (ref 35–45)
HDLC SERPL-MCNC: 87 MG/DL (ref 40–60)
HDLC SERPL: 3.2 {RATIO} (ref 0–5)
HGB BLD-MCNC: 12.5 G/DL (ref 12–16)
LDLC SERPL CALC-MCNC: 172 MG/DL (ref 0–100)
LIPID PROFILE,FLP: ABNORMAL
LYMPHOCYTES # BLD: 1.4 K/UL (ref 0.9–3.6)
LYMPHOCYTES NFR BLD: 39 % (ref 21–52)
MCH RBC QN AUTO: 31.2 PG (ref 24–34)
MCHC RBC AUTO-ENTMCNC: 33.4 G/DL (ref 31–37)
MCV RBC AUTO: 93.3 FL (ref 74–97)
MONOCYTES # BLD: 0.6 K/UL (ref 0.05–1.2)
MONOCYTES NFR BLD: 15 % (ref 3–10)
NEUTS SEG # BLD: 1.5 K/UL (ref 1.8–8)
NEUTS SEG NFR BLD: 42 % (ref 40–73)
PLATELET # BLD AUTO: 275 K/UL (ref 135–420)
PMV BLD AUTO: 10.5 FL (ref 9.2–11.8)
POTASSIUM SERPL-SCNC: 4.5 MMOL/L (ref 3.5–5.5)
PROT SERPL-MCNC: 7.3 G/DL (ref 6.4–8.2)
RBC # BLD AUTO: 4.01 M/UL (ref 4.2–5.3)
SODIUM SERPL-SCNC: 141 MMOL/L (ref 136–145)
TRIGL SERPL-MCNC: 80 MG/DL (ref ?–150)
VLDLC SERPL CALC-MCNC: 16 MG/DL
WBC # BLD AUTO: 3.6 K/UL (ref 4.6–13.2)

## 2020-10-23 PROCEDURE — 80053 COMPREHEN METABOLIC PANEL: CPT

## 2020-10-23 PROCEDURE — 80061 LIPID PANEL: CPT

## 2020-10-23 PROCEDURE — 36415 COLL VENOUS BLD VENIPUNCTURE: CPT

## 2020-10-23 PROCEDURE — 85025 COMPLETE CBC W/AUTO DIFF WBC: CPT

## 2020-10-25 NOTE — PROGRESS NOTES
Please let her know that her CBC shows a mildly low WBC of 3.6. I will recheck this in a year. Renal and liver function labs are unremarkable. Her total cholesterol is worse at 275. It was 252 a year ago. Her triglycerides are 80. Her HDL is 87. LDL is 172, up from 154 a year ago. Given her very high cholesterol, I would recommend that she take a cholesterol lowering medication. Please let me know if she is agreeable to this so that I can send a rx to her pharmacy.      Dr. Delia Sandoval  [facility], [address] [place] [address], [address]  Phone: [phone]  Fax: [phone]

## 2020-10-27 ENCOUNTER — TELEPHONE (OUTPATIENT)
Dept: INTERNAL MEDICINE CLINIC | Age: 70
End: 2020-10-27

## 2020-10-27 DIAGNOSIS — E78.2 MIXED HYPERLIPIDEMIA: Primary | ICD-10-CM

## 2020-10-27 NOTE — TELEPHONE ENCOUNTER
Patient contacted, patient identified with two identifiers (Name & ). Patient aware of results per  and verbalizes understanding. Patient does agree to start a cholesterol lowering medication as long as its a generic as she does not have a prescription plan.

## 2020-10-27 NOTE — TELEPHONE ENCOUNTER
----- Message from Johnie Poon MD sent at 10/25/2020  3:33 PM EDT -----  Please let her know that her CBC shows a mildly low WBC of 3.6. I will recheck this in a year. Renal and liver function labs are unremarkable. Her total cholesterol is worse at 275. It was 252 a year ago. Her triglycerides are 80. Her HDL is 87. LDL is 172, up from 154 a year ago. Given her very high cholesterol, I would recommend that she take a cholesterol lowering medication. Please let me know if she is agreeable to this so that I can send a rx to her pharmacy.      Dr. Shey Snow  [facility], [facility], [facility] [address]  Phone: [phone]  Fax: [phone]

## 2020-10-28 RX ORDER — LOVASTATIN 40 MG/1
40 TABLET ORAL
Qty: 90 TAB | Refills: 3 | Status: SHIPPED | OUTPATIENT
Start: 2020-10-28

## 2020-10-28 NOTE — TELEPHONE ENCOUNTER
I a ordering lovastatin. She can get a coupon for this rx at [employer] and it should be inexpensive.     Dr. Apurva Shepard  [facility] [address]  Phone: [phone]  Fax: [phone]

## 2020-11-04 ENCOUNTER — OFFICE VISIT (OUTPATIENT)
Dept: INTERNAL MEDICINE CLINIC | Age: 70
End: 2020-11-04
Payer: MEDICARE

## 2020-11-04 DIAGNOSIS — Z23 NEEDS FLU SHOT: Primary | ICD-10-CM

## 2020-11-04 PROCEDURE — 90694 VACC AIIV4 NO PRSRV 0.5ML IM: CPT | Performed by: INTERNAL MEDICINE

## 2021-01-25 ENCOUNTER — PATIENT MESSAGE (OUTPATIENT)
Dept: INTERNAL MEDICINE CLINIC | Age: 71
End: 2021-01-25

## 2022-01-17 DIAGNOSIS — B35.9 TINEA: ICD-10-CM

## 2022-01-18 RX ORDER — FLUCONAZOLE 100 MG/1
TABLET ORAL
Qty: 7 TABLET | Refills: 4 | OUTPATIENT
Start: 2022-01-18

## 2022-01-18 NOTE — TELEPHONE ENCOUNTER
Please have her scheduled for a virtual visit or in office apt with me today for medication refills.  I have not spoken with her in >1 yr.     Dr. Nika Warner  [facility], [address] [place] [address], [address] [place] [address] Str.  Phone: [phone]  Fax: [phone]

## 2022-01-18 NOTE — TELEPHONE ENCOUNTER
Patient reached, she states that the pharmacy sent in the request, but she has moved a year ago to [place]. Please remove DR. Bill Muro as PCP, and disregard refill request.

## 2023-11-13 NOTE — PROGRESS NOTES
----- Message from Elizabeth Barakat DO sent at 11/13/2023  3:28 PM CST -----  Normal GES. Please let family know,  Thanks,  Dr. SPARKS   Patient presented to office for Influenza 0.5 ml injection. Allergies noted. Patient well and consenting to injection. Injection given intramuscular in left deltoid. Patient tolerated injection well and left office ambulatory. [place] presents today for   Chief Complaint   Patient presents with   [facility] Follow Up       [place] preferred language for health care discussion is english/other. Is someone accompanying this pt? no    Is the patient using any DME equipment during OV? no    Depression Screening:  No flowsheet data found. Learning Assessment:  Learning Assessment 7/1/2014   PRIMARY LEARNER Patient   HIGHEST LEVEL OF EDUCATION - PRIMARY LEARNER  4 YEARS OF COLLEGE   BARRIERS PRIMARY LEARNER NONE   CO-LEARNER CAREGIVER No   PRIMARY LANGUAGE ENGLISH    NEED No   LEARNER PREFERENCE PRIMARY DEMONSTRATION   LEARNING SPECIAL TOPICS none   ANSWERED BY self       Abuse Screening:  Abuse Screening Questionnaire 7/22/2016   Do you ever feel afraid of your partner? N   Are you in a relationship with someone who physically or mentally threatens you? N   Is it safe for you to go home? Y       Fall Risk  Fall Risk Assessment, last 12 mths 1/8/2018   Able to walk? Yes   Fall in past 12 months? No       Health Maintenance reviewed and discussed and ordered per Provider. Health Maintenance Due   Topic Date Due    Hepatitis C Screening  1950    DTaP/Tdap/Td series (1 - Tdap) 12/04/1973    Shingrix Vaccine Age 50> (1 of 2) 12/04/2002    FOBT Q 1 YEAR AGE 50-75  12/04/2002    GLAUCOMA SCREENING Q2Y  12/04/2017    Bone Densitometry (Dexa) Screening  12/04/2017    Influenza Age 9 to Adult  08/01/2018    MEDICARE YEARLY EXAM  11/12/2018   . [place] is updated on all     Pt currently taking Antiplatelet therapy? no    Coordination of Care:  1. Have you been to the ER, urgent care clinic since your last visit?  Yes [facility] ER 11/12/18 Diverticulitis Hospitalized since your last visit? no    2. Have you seen or consulted any other health care providers outside of the [address] [place] [address] since your last visit? no Include any pap smears or colon screening.  no

## (undated) DEVICE — FLUFF AND POLYMER UNDERPAD,EXTRA HEAVY: Brand: WINGS

## (undated) DEVICE — COLUMN DRAPE

## (undated) DEVICE — SOLUTION IV 1000ML 0.9% SOD CHL

## (undated) DEVICE — STERILE POLYISOPRENE POWDER-FREE SURGICAL GLOVES: Brand: PROTEXIS

## (undated) DEVICE — TRAY PREP DRY W/ PREM GLV 2 APPL 6 SPNG 2 UNDPD 1 OVERWRAP

## (undated) DEVICE — SUTURE V-LOC 90 SZ 3-0 L6IN ABSRB VLT V-20 L26MM 1/2 CIR VLOCM0604

## (undated) DEVICE — COVER LT HNDL BLU STRL -- MEDICHOICE

## (undated) DEVICE — DRAPE,UNDERBUTTOCKS,PCH,STERILE: Brand: MEDLINE

## (undated) DEVICE — ELECTRO LUBE IS A SINGLE PATIENT USE DEVICE THAT IS INTENDED TO BE USED ON ELECTROSURGICAL ELECTRODES TO REDUCE STICKING.: Brand: KEY SURGICAL ELECTRO LUBE

## (undated) DEVICE — VESSEL SEALER XI EXTENDED DISP -- VESSEL

## (undated) DEVICE — AIRLIFE™ ADULT CUSHION NASAL CANNULA 14 FOOT (4.3) CRUSH-RESISTANT OXYGEN TUBING, AND U/CONNECT-IT ADAPTER: Brand: AIRLIFE™

## (undated) DEVICE — KENDALL SCD EXPRESS SLEEVES, KNEE LENGTH, MEDIUM: Brand: KENDALL SCD

## (undated) DEVICE — INTENDED FOR TISSUE SEPARATION, AND OTHER PROCEDURES THAT REQUIRE A SHARP SURGICAL BLADE TO PUNCTURE OR CUT.: Brand: BARD-PARKER ®  SAFETY SCALPED

## (undated) DEVICE — ENDOSCOPY PUMP TUBING/ CAP SET: Brand: ERBE

## (undated) DEVICE — STRIP,CLOSURE,WOUND,MEDI-STRIP,1/2X4: Brand: MEDLINE

## (undated) DEVICE — CATHETER THOR 36FR DIA10.7MM POLYVI CHL TRCR TIP STR SFT

## (undated) DEVICE — VISUALIZATION SYSTEM: Brand: CLEARIFY

## (undated) DEVICE — BLADELESS OPTICAL TROCAR WITH FIXATION CANNULA: Brand: VERSAPORT

## (undated) DEVICE — 40580 - THE PINK PAD - ADVANCED TRENDELENBURG POSITIONING KIT: Brand: 40580 - THE PINK PAD - ADVANCED TRENDELENBURG POSITIONING KIT

## (undated) DEVICE — Device

## (undated) DEVICE — SUTURE PDS II SZ 1 L36IN ABSRB VLT CTXB L48MM 1/2 CIR BLNT ZB371

## (undated) DEVICE — SYR 10ML LUER LOK 1/5ML GRAD --

## (undated) DEVICE — 3M™ STERI-DRAPE™ INSTRUMENT POUCH 1018L: Brand: STERI-DRAPE™

## (undated) DEVICE — STAPLER 45 RELOAD BLUE: Brand: ENDOWRIST

## (undated) DEVICE — SUTURE MCRYL SZ 4-0 L27IN ABSRB UD L24MM PS-1 3/8 CIR PRIM Y935H

## (undated) DEVICE — MEDI-VAC NON-CONDUCTIVE SUCTION TUBING: Brand: CARDINAL HEALTH

## (undated) DEVICE — GRASPER RMFG BABCOCK 5MM -- LAWSON OEM ITEM 112763

## (undated) DEVICE — ARM DRAPE

## (undated) DEVICE — SYR IRR CATH TIP LR ADPT 70ML -- CONVERT TO ITEM 363120

## (undated) DEVICE — TAPE,CLOTH/SILK,CURAD,3"X10YD,LF,40/CS: Brand: CURAD

## (undated) DEVICE — BAG SPEC RETRV 275ML 10ML DISPOSABLE RELIACATCH

## (undated) DEVICE — LUB SURG MEDC STRL 2OZ TUBE MC -- MEDICHOICE

## (undated) DEVICE — OCCLUSIVE GAUZE STRIP,3% BISMUTH TRIBROMOPHENATE IN PETROLATUM BLEND: Brand: XEROFORM

## (undated) DEVICE — CANNULA ORIG TL CLR W FOAM CUSHIONS AND 14FT SUPL TB 3 CHN

## (undated) DEVICE — TIP COVER ACCESSORY

## (undated) DEVICE — SUTURE VCRL SZ 3-0 L27IN ABSRB VLT L26MM SH 1/2 CIR J316H

## (undated) DEVICE — SOL ANTI-FOG 6ML MEDC -- MEDICHOICE - CONVERT TO 358427

## (undated) DEVICE — SUTURE VCRL SZ 0 L18IN ABSRB UD POLYGLACTIN 910 BRAID TIE J912G

## (undated) DEVICE — DRESSING,GAUZE,XEROFORM,CURAD,1"X8",ST: Brand: CURAD

## (undated) DEVICE — GOWN ISOL IMPERV UNIV, DISP, OPEN BACK, BLUE --

## (undated) DEVICE — SYR 50ML SLIP TIP NSAF LF STRL --

## (undated) DEVICE — MEDI-VAC SUCTION HIGH CAPACITY: Brand: CARDINAL HEALTH

## (undated) DEVICE — SEAL UNIV 5-8MM DISP BX/10 -- DA VINCI XI - SNGL USE

## (undated) DEVICE — FLEX ADVANTAGE 3000CC: Brand: FLEX ADVANTAGE

## (undated) DEVICE — SOLUTION IRRIG 1000ML H2O STRL BLT

## (undated) DEVICE — AIRLIFE™ NASAL OXYGEN CANNULA CURVED, NONFLARED TIP WITH 14 FOOT (4.3 M) CRUSH-RESISTANT TUBING, OVER-THE-EAR STYLE: Brand: AIRLIFE™

## (undated) DEVICE — Z DISCONTINUED USE 2660780 STAPLER INT L28CM DIA33MM CLS STPL H10-2.5MM OPN LEG L5.5MM

## (undated) DEVICE — STAPLER SHEATH: Brand: ENDOWRIST

## (undated) DEVICE — SYRINGE MED 25GA 3ML L5/8IN SUBQ PLAS W/ DETACH NDL SFTY

## (undated) DEVICE — NEEDLE INSUF L120MM DIA2MM DISP FOR PNEUMOPERI ENDOPATH

## (undated) DEVICE — SYR 50ML LR LCK 1ML GRAD NSAF --

## (undated) DEVICE — SOFT SILICONE HYDROCELLULAR SACRUM DRESSING WITH LOCK AWAY LAYER: Brand: ALLEVYN LIFE SACRUM (LARGE) PACK OF 10

## (undated) DEVICE — SEAL

## (undated) DEVICE — PACK PROCEDURE SURG MAJ W/ BASIN LF

## (undated) DEVICE — SYRINGE MED 20ML STD CLR PLAS LUERLOCK TIP N CTRL DISP

## (undated) DEVICE — (D)GLOVE EXAM LG NITRL NS -- DISC BY MFR NO SUB

## (undated) DEVICE — REDUCER CANN ENDOWRIST 12-8MM -- DA VINCI XI - SNGL USE

## (undated) DEVICE — GAUZE,SPONGE,4"X4",16PLY,STRL,LF,10/TRAY: Brand: MEDLINE

## (undated) DEVICE — TRAY CATH OD16FR SIL URIN M STATLOK STBL DEV SURSTP

## (undated) DEVICE — BLADELESS OBTURATOR: Brand: WECK VISTA

## (undated) DEVICE — SUTURE SZ 0 27IN 5/8 CIR UR-6  TAPER PT VIOLET ABSRB VICRYL J603H

## (undated) DEVICE — LAPAROSCOPIC ACCESS SYSTEM: Brand: ALEXIS LAPAROSCOPIC SYSTEM WITH KII FIOS FIRST ENTRY

## (undated) DEVICE — SUTURE VCRL SZ 0 L27IN ABSRB UD L36MM CT-1 1/2 CIR J260H

## (undated) DEVICE — REM POLYHESIVE ADULT PATIENT RETURN ELECTRODE: Brand: VALLEYLAB